# Patient Record
Sex: FEMALE | Race: BLACK OR AFRICAN AMERICAN | NOT HISPANIC OR LATINO | Employment: UNEMPLOYED | URBAN - METROPOLITAN AREA
[De-identification: names, ages, dates, MRNs, and addresses within clinical notes are randomized per-mention and may not be internally consistent; named-entity substitution may affect disease eponyms.]

---

## 2022-12-19 ENCOUNTER — HOSPITAL ENCOUNTER (OUTPATIENT)
Dept: DATA CONVERSION | Facility: HOSPITAL | Age: 22
End: 2022-12-19
Attending: STUDENT IN AN ORGANIZED HEALTH CARE EDUCATION/TRAINING PROGRAM

## 2022-12-19 DIAGNOSIS — R11.0 NAUSEA: ICD-10-CM

## 2022-12-19 DIAGNOSIS — O10.912 UNSPECIFIED PRE-EXISTING HYPERTENSION COMPLICATING PREGNANCY, SECOND TRIMESTER (HHS-HCC): ICD-10-CM

## 2022-12-19 DIAGNOSIS — O36.8120 DECREASED FETAL MOVEMENTS, SECOND TRIMESTER, NOT APPLICABLE OR UNSPECIFIED (HHS-HCC): ICD-10-CM

## 2022-12-19 DIAGNOSIS — Z34.80 ENCOUNTER FOR SUPERVISION OF OTHER NORMAL PREGNANCY, UNSPECIFIED TRIMESTER (HHS-HCC): ICD-10-CM

## 2022-12-19 DIAGNOSIS — Z79.82 LONG TERM (CURRENT) USE OF ASPIRIN: ICD-10-CM

## 2022-12-19 DIAGNOSIS — R10.2 PELVIC AND PERINEAL PAIN: ICD-10-CM

## 2022-12-19 DIAGNOSIS — R19.7 DIARRHEA, UNSPECIFIED: ICD-10-CM

## 2022-12-19 DIAGNOSIS — Z3A.25 25 WEEKS GESTATION OF PREGNANCY (HHS-HCC): ICD-10-CM

## 2022-12-19 DIAGNOSIS — O26.892 OTHER SPECIFIED PREGNANCY RELATED CONDITIONS, SECOND TRIMESTER (HHS-HCC): ICD-10-CM

## 2022-12-19 DIAGNOSIS — O09.212 SUPERVISION OF PREGNANCY WITH HISTORY OF PRE-TERM LABOR, SECOND TRIMESTER (HHS-HCC): ICD-10-CM

## 2022-12-19 DIAGNOSIS — Z79.899 OTHER LONG TERM (CURRENT) DRUG THERAPY: ICD-10-CM

## 2022-12-19 LAB
APPEARANCE, URINE: CLEAR
BILIRUBIN, URINE: NEGATIVE
BLOOD, URINE: NEGATIVE
COLOR, URINE: YELLOW
GLUCOSE, URINE: NEGATIVE MG/DL
KETONES, URINE: NEGATIVE MG/DL
LEUKOCYTE ESTERASE, URINE: NEGATIVE
NITRITE, URINE: NEGATIVE
PH, URINE: 6.5 (ref 5–8)
PROTEIN, URINE: NEGATIVE MG/DL
SPECIFIC GRAVITY, URINE: 1.02 (ref 1–1.03)
UROBILINOGEN, URINE: <2 MG/DL (ref 0–1.9)

## 2023-01-23 ENCOUNTER — HOSPITAL ENCOUNTER (OUTPATIENT)
Dept: DATA CONVERSION | Facility: HOSPITAL | Age: 23
End: 2023-01-23
Attending: OBSTETRICS & GYNECOLOGY

## 2023-01-23 DIAGNOSIS — Z3A.30 30 WEEKS GESTATION OF PREGNANCY (HHS-HCC): ICD-10-CM

## 2023-01-23 DIAGNOSIS — F43.10 POST-TRAUMATIC STRESS DISORDER, UNSPECIFIED: ICD-10-CM

## 2023-01-23 DIAGNOSIS — F32.A DEPRESSION, UNSPECIFIED: ICD-10-CM

## 2023-01-23 DIAGNOSIS — O99.343 OTHER MENTAL DISORDERS COMPLICATING PREGNANCY, THIRD TRIMESTER (HHS-HCC): ICD-10-CM

## 2023-01-23 DIAGNOSIS — O26.893 OTHER SPECIFIED PREGNANCY RELATED CONDITIONS, THIRD TRIMESTER (HHS-HCC): ICD-10-CM

## 2023-01-23 DIAGNOSIS — R10.2 PELVIC AND PERINEAL PAIN: ICD-10-CM

## 2023-01-23 DIAGNOSIS — O36.8130 DECREASED FETAL MOVEMENTS, THIRD TRIMESTER, NOT APPLICABLE OR UNSPECIFIED (HHS-HCC): ICD-10-CM
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
## 2023-01-23 DIAGNOSIS — Z34.80 ENCOUNTER FOR SUPERVISION OF OTHER NORMAL PREGNANCY, UNSPECIFIED TRIMESTER (HHS-HCC): ICD-10-CM

## 2023-01-23 DIAGNOSIS — O09.293 SUPERVISION OF PREGNANCY WITH OTHER POOR REPRODUCTIVE OR OBSTETRIC HISTORY, THIRD TRIMESTER (HHS-HCC): ICD-10-CM

## 2023-01-23 LAB
ACTIVATED PARTIAL THROMBOPLASTIN TIME IN PPP BY COAGULATION ASSAY: 24 SEC (ref 26–39)
ALANINE AMINOTRANSFERASE (SGPT) (U/L) IN SER/PLAS: 6 U/L (ref 7–45)
ALBUMIN (G/DL) IN SER/PLAS: 3.6 G/DL (ref 3.4–5)
ALKALINE PHOSPHATASE (U/L) IN SER/PLAS: 149 U/L (ref 33–110)
ANION GAP IN SER/PLAS: 15 MMOL/L (ref 10–20)
ASPARTATE AMINOTRANSFERASE (SGOT) (U/L) IN SER/PLAS: 10 U/L (ref 9–39)
BILIRUBIN TOTAL (MG/DL) IN SER/PLAS: 0.3 MG/DL (ref 0–1.2)
CALCIUM (MG/DL) IN SER/PLAS: 9.2 MG/DL (ref 8.6–10.6)
CARBON DIOXIDE, TOTAL (MMOL/L) IN SER/PLAS: 21 MMOL/L (ref 21–32)
CHLORIDE (MMOL/L) IN SER/PLAS: 104 MMOL/L (ref 98–107)
CLUE CELLS WET PREP: NORMAL
CREATININE (MG/DL) IN SER/PLAS: 0.45 MG/DL (ref 0.5–1.05)
ERYTHROCYTE DISTRIBUTION WIDTH (RATIO) BY AUTOMATED COUNT: 14.5 % (ref 11.5–14.5)
ERYTHROCYTE MEAN CORPUSCULAR HEMOGLOBIN CONCENTRATION (G/DL) BY AUTOMATED: 31.4 G/DL (ref 32–36)
ERYTHROCYTE MEAN CORPUSCULAR VOLUME (FL) BY AUTOMATED COUNT: 83 FL (ref 80–100)
ERYTHROCYTES (10*6/UL) IN BLOOD BY AUTOMATED COUNT: 4.67 X10E12/L (ref 4–5.2)
FIBRINOGEN (MG/DL) IN PPP BY COAGULATION ASSAY: 560 MG/DL (ref 200–400)
GFR FEMALE: >90 ML/MIN/1.73M2
GLUCOSE (MG/DL) IN SER/PLAS: 89 MG/DL (ref 74–99)
HEMATOCRIT (%) IN BLOOD BY AUTOMATED COUNT: 38.8 % (ref 36–46)
HEMOGLOBIN (G/DL) IN BLOOD: 12.2 G/DL (ref 12–16)
INR IN PPP BY COAGULATION ASSAY: 0.9 (ref 0.9–1.1)
LEUKOCYTES (10*3/UL) IN BLOOD BY AUTOMATED COUNT: 12.1 X10E9/L (ref 4.4–11.3)
NRBC (PER 100 WBCS) BY AUTOMATED COUNT: 0 /100 WBC (ref 0–0)
PLATELETS (10*3/UL) IN BLOOD AUTOMATED COUNT: 253 X10E9/L (ref 150–450)
POCT GLUCOSE: 117 MG/DL (ref 74–99)
POTASSIUM (MMOL/L) IN SER/PLAS: 3.7 MMOL/L (ref 3.5–5.3)
PROTEIN TOTAL: 6.4 G/DL (ref 6.4–8.2)
PROTHROMBIN TIME (PT) IN PPP BY COAGULATION ASSAY: 10.8 SEC (ref 9.8–13.4)
SODIUM (MMOL/L) IN SER/PLAS: 136 MMOL/L (ref 136–145)
TRICH WET PREP: NORMAL
TRICHOMONAS REFLEX COMMENT: NORMAL
UREA NITROGEN (MG/DL) IN SER/PLAS: 6 MG/DL (ref 6–23)
WBC WET PREP: NORMAL /HPF
YEAST PRESENCE WET PREP: NORMAL

## 2023-01-24 LAB
CHLAMYDIA TRACH., AMPLIFIED: NEGATIVE
N. GONORRHEA, AMPLIFIED: NEGATIVE
TRICHOMONAS VAGINALIS: NEGATIVE

## 2023-02-02 ENCOUNTER — HOSPITAL ENCOUNTER (OUTPATIENT)
Dept: DATA CONVERSION | Facility: HOSPITAL | Age: 23
End: 2023-02-02
Attending: OBSTETRICS & GYNECOLOGY
Payer: MEDICAID

## 2023-02-02 DIAGNOSIS — N89.8 OTHER SPECIFIED NONINFLAMMATORY DISORDERS OF VAGINA: ICD-10-CM

## 2023-02-02 DIAGNOSIS — R00.0 TACHYCARDIA, UNSPECIFIED: ICD-10-CM

## 2023-02-02 DIAGNOSIS — O09.213 SUPERVISION OF PREGNANCY WITH HISTORY OF PRE-TERM LABOR, THIRD TRIMESTER (HHS-HCC): ICD-10-CM

## 2023-02-02 DIAGNOSIS — O26.893 OTHER SPECIFIED PREGNANCY RELATED CONDITIONS, THIRD TRIMESTER (HHS-HCC): ICD-10-CM

## 2023-02-02 DIAGNOSIS — O99.891 OTHER SPECIFIED DISEASES AND CONDITIONS COMPLICATING PREGNANCY (HHS-HCC): ICD-10-CM

## 2023-02-02 DIAGNOSIS — Z3A.31 31 WEEKS GESTATION OF PREGNANCY (HHS-HCC): ICD-10-CM

## 2023-02-02 DIAGNOSIS — O24.410 GESTATIONAL DIABETES MELLITUS IN PREGNANCY, DIET CONTROLLED (HHS-HCC): ICD-10-CM

## 2023-02-28 LAB
ATRIAL RATE: 118 BPM
P AXIS: 40 DEGREES
P OFFSET: 204 MS
P ONSET: 155 MS
PR INTERVAL: 140 MS
Q ONSET: 225 MS
QRS COUNT: 19 BEATS
QRS DURATION: 70 MS
QT INTERVAL: 310 MS
QTC CALCULATION(BAZETT): 434 MS
QTC FREDERICIA: 388 MS
R AXIS: 69 DEGREES
T AXIS: 9 DEGREES
T OFFSET: 380 MS
VENTRICULAR RATE: 118 BPM

## 2023-03-01 NOTE — PROGRESS NOTES
Current Stage:   Stage: Triage     Subjective Data:   Antepartum:  Antepartum:    Scooter is a 23yo  at 25.5wks b/o LMP and c/w 11.2wk U/S who presents to triage for decreased fetal movement and pelvic pain. Patient states that she began to feel less  fetal movement earlier today, as well as abdominal pain throughout her abdomen which she was concerned were contractions. She states that the pain feels like contractions, but she doesn't feel like she's dilating like her previous children. Additionally,  she states that she is having something bulging out of her vagina, which has been going on since the birth of her son. She states that she saw another doctor about it who offered her a hysterectomy to treat this. She does endorse some nausea and reflux.  She denies any chest pain, but does feel palpitations. She endorses incontinence and hesitancy, but no dysuria, changes in bowels, loss of fluid or vaginal bleeding.    Pregnancy notable for:  - cHTN, no meds, baseline labs WNL, P:C 0.11  - PPH from manual removal of placenta  - Desires bTL, signed consents 10/13  - H/o PPD, refer to Dr. Lombardi PP w/ plan to start SSRI PP  - Fetal PACs, since resolved  - PTD @ 35wks (PPROM, sPEC)  & @ 36wks (DM, PEC)     OBHx: pre-eclampsia x2, GDMA   GYNHx: Hx of gonorrhea, no abnl pap, cystocele?  PMH: denies  PSH: denies  Allergies: NKDA  Meds: pnv, metoclopramide, B6, ASA 81mg bid  Social: denies tobacco, etoh, drug use  FHx: reviewed and not pertinent to clinical presentation        Objective Information:    Objective Information:      T   P  R  BP   MAP  SpO2   Value  37.1  110  19  121/67   88  98%  Date/Time  12:56  13:39 12 12:56  12:56   12:56  13:39  Range  (36.6C - 37.1C )  (108 - 120 )  (16 - 19 )  (121 - 131 )/ (67 - 77 )  (88 - 88 )  (97% - 99% )  Highest temp of 37.1 C was recorded at  12:56      Pain reported at  12:11: 10 = Severe      Physical Exam:    Constitutional: alert, oriented, in no acute distress.   Obstetric: Cervix closed, high and firm.   Eyes: pupils equal, sclerae clear   ENMT: normal dentition, moist mucous membranes.   Respiratory/Thorax: normal respiratory effort, lungs  clear, no rales wheezes or rhonchi   Cardiovascular: normal S1/S2 RRR, no murmurs   Gastrointestinal: gravid abdomen, soft, moderately  TTP with displacement of uterus, nondistended without guarding or rebound tenderness.   Musculoskeletal: normal gait   Extremities: no edema or erythema of LE, 2+ PT, DP,  and radial pulses bilaterally.   Neurological: no obvious facial droop, slurred speech,  moves all four extremities spontaneously. No evidence of focal deficit   Psychological: appropriate mood and affect   Skin: no rashes or lesions.      Testing:   Biophysical Profile - Baby A:  ·  Fetal Breath Movements At least 30 seconds of sustained FBM/30 minutes   ·  Fetal Movements 3 or more gross body movements/30 minutes   ·  Fetal Tone At least 1 episode of motion of limb   ·  Amniotic Fluid Volume Pocket of fluid at least 2 cm in 2 perpendicular planes   ·  BPP Score 8     Assessment and Plan:   Assessment:    Scooter is a 23yo  at 25.5wks b/o LMP and c/w 11.2wk U/S who presents to triage for decreased fetal movement and pelvic pain.    #decreased movement  -FHT reassuring, visualized movement on US  BPP 8/8    #abdominal pain  - labor less likely given normal cervical exam, possible gastroenteritis given abdominal pain and diarrhea  -Send urinalysis to confirm no evidence of UTI, nephrolithiasis  -Rx tylenol 500mg 2 q6hr as needed  -recommend heat packs, rest, abdominal binder    Dispo: routine OB follow-up    Discussed with Dr. Isaiah Wyatt MD  Emergency Medicine PGY-1      Attestation:   Note Completion:  I am a:  Resident/Fellow   Attending Attestation I saw and evaluated the patient.  I personally obtained the key and critical portions of the  history and physical exam or was physically present for key and  critical portions performed by the resident/fellow. I reviewed the resident/fellow?s documentation and discussed the patient with the resident/fellow.  I agree with the resident/fellow?s medical decision making as documented in the note.     I personally evaluated the patient on 19-Dec-2022         Electronic Signatures:  Param Wyatt (Resident))  (Signed 19-Dec-2022 14:34)   Authored: Current Stage, Subjective Data, Objective Data,   Testing, Assessment and Plan, Note Completion  Tammy Colon)  (Signed 19-Dec-2022 16:17)   Authored: Note Completion   Co-Signer: Current Stage, Subjective Data, Objective Data,  Testing, Assessment and Plan, Note Completion      Last Updated: 19-Dec-2022 16:17 by Tammy Colon)
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
## 2023-03-03 NOTE — PROGRESS NOTES
Current Stage:   Stage: Triage     OB Dating:   EDC/EGA:  ·  EGA 31.6     Subjective Data:   Antepartum:  Vaginal Bleeding: No   Contractions/Abdominal Pain: Yes   Discharge/Loss of Fluid: Yes   Fetal Movement: Good   Fevers/Chills: No   Preeclampsia Symptoms: No   Antepartum:    23 y/o  @ 31.6 presents with lof and irregular ctx. States she had a small amount of leaking around 2200 last night, no further leaking, not sure if it was  her water. Thinks she might be having ctx but unsure how frequent. Reports normal fetal movement, denies vaginal bleeding.      Objective Information:    Objective Information:      T   P  R  BP   MAP  SpO2   Value  36.9  100  20  130/75   95  98%  Date/Time  1:32  2:37 2 1:32 2 1:32  22 1:32  2:42  Range  (36.5C - 36.9C )  (100 - 120 )  (20 - 20 )  (128 - 130 )/ (75 - 84 )  (95 - 95 )  (97% - 99% )  Highest temp of 36.9 C was recorded at  1:32      Pain reported at  1:32: 0 = None      Physical Exam:   Constitutional: alert, oriented, appears comfortable   Obstetric: , mod variability, 10x10 accels,  no decels  [place] none  SSE neg pooling/nitrazine/ferning  SVE ft/50/-3     pulse initially 115-120--> 100-105 after by mouth hydration     Assessment and Plan:   Assessment:    a: 23 y/o  @ 31.6  intact membranes  not in ptl  NST reassuring for gestational age  tachycardia likely 2/2 dehydration, improved with by mouth fluids    p: discharge home w/precautions  f/u with scheduled appt  or as needed  d/w dr. suzanna ferguson cnm      Electronic Signatures:  Kae Ferguson (APRN-CNM)  (Signed 2023 02:50)   Authored: Current Stage, OB Dating, Subjective Data,  Objective Data, Assessment and Plan, Note Completion      Last Updated: 2023 02:50 by Kae Ferguson (APRN-JAMESM)

## 2023-05-24 ENCOUNTER — HOSPITAL ENCOUNTER (OUTPATIENT)
Dept: DATA CONVERSION | Facility: HOSPITAL | Age: 23
Discharge: HOME | End: 2023-05-24
Attending: STUDENT IN AN ORGANIZED HEALTH CARE EDUCATION/TRAINING PROGRAM | Admitting: STUDENT IN AN ORGANIZED HEALTH CARE EDUCATION/TRAINING PROGRAM

## 2023-05-24 DIAGNOSIS — N81.4 UTEROVAGINAL PROLAPSE, UNSPECIFIED: ICD-10-CM

## 2023-05-24 DIAGNOSIS — F43.10 POST-TRAUMATIC STRESS DISORDER, UNSPECIFIED: ICD-10-CM

## 2023-05-24 DIAGNOSIS — N32.81 OVERACTIVE BLADDER: ICD-10-CM

## 2023-05-24 DIAGNOSIS — N81.9 FEMALE GENITAL PROLAPSE, UNSPECIFIED: ICD-10-CM

## 2023-05-24 DIAGNOSIS — F32.A DEPRESSION, UNSPECIFIED: ICD-10-CM

## 2023-05-24 LAB
ABO GROUP (TYPE) IN BLOOD: NORMAL
ANTIBODY SCREEN: NORMAL
HCG, URINE: NEGATIVE
RH FACTOR: NORMAL

## 2023-07-26 ENCOUNTER — HOSPITAL ENCOUNTER (OUTPATIENT)
Dept: DATA CONVERSION | Facility: HOSPITAL | Age: 23
End: 2023-07-26
Attending: STUDENT IN AN ORGANIZED HEALTH CARE EDUCATION/TRAINING PROGRAM | Admitting: STUDENT IN AN ORGANIZED HEALTH CARE EDUCATION/TRAINING PROGRAM
Payer: MEDICAID

## 2023-07-26 DIAGNOSIS — Z30.2 ENCOUNTER FOR STERILIZATION: ICD-10-CM

## 2023-07-26 DIAGNOSIS — N83.8 OTHER NONINFLAMMATORY DISORDERS OF OVARY, FALLOPIAN TUBE AND BROAD LIGAMENT: ICD-10-CM

## 2023-07-26 LAB — HCG, URINE: NEGATIVE

## 2023-08-02 LAB
COMPLETE PATHOLOGY REPORT: NORMAL
CONVERTED CLINICAL DIAGNOSIS-HISTORY: NORMAL
CONVERTED FINAL DIAGNOSIS: NORMAL
CONVERTED FINAL REPORT PDF LINK TO COPY AND PASTE: NORMAL
CONVERTED GROSS DESCRIPTION: NORMAL

## 2023-09-07 VITALS
SYSTOLIC BLOOD PRESSURE: 131 MMHG | RESPIRATION RATE: 16 BRPM | OXYGEN SATURATION: 98 % | DIASTOLIC BLOOD PRESSURE: 77 MMHG | HEIGHT: 66 IN | WEIGHT: 194.89 LBS | HEART RATE: 120 BPM | TEMPERATURE: 97.9 F | BODY MASS INDEX: 31.32 KG/M2

## 2023-09-07 VITALS
HEART RATE: 87 BPM | DIASTOLIC BLOOD PRESSURE: 83 MMHG | TEMPERATURE: 97.2 F | RESPIRATION RATE: 16 BRPM | SYSTOLIC BLOOD PRESSURE: 126 MMHG

## 2023-09-08 VITALS
BODY MASS INDEX: 32.88 KG/M2 | HEART RATE: 114 BPM | WEIGHT: 204.59 LBS | HEIGHT: 66 IN | SYSTOLIC BLOOD PRESSURE: 128 MMHG | TEMPERATURE: 97.7 F | DIASTOLIC BLOOD PRESSURE: 84 MMHG | RESPIRATION RATE: 20 BRPM | OXYGEN SATURATION: 97 %

## 2023-09-08 VITALS
RESPIRATION RATE: 20 BRPM | DIASTOLIC BLOOD PRESSURE: 76 MMHG | HEIGHT: 66 IN | TEMPERATURE: 98.8 F | WEIGHT: 202.82 LBS | HEART RATE: 135 BPM | OXYGEN SATURATION: 99 % | SYSTOLIC BLOOD PRESSURE: 118 MMHG | BODY MASS INDEX: 32.6 KG/M2
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
## 2023-09-14 NOTE — PROGRESS NOTES
Current Stage:   Stage: Triage     OB Dating:   EDC/EGA:  ·  EGA 31.6     Subjective Data:   Antepartum:  Vaginal Bleeding: No   Contractions/Abdominal Pain: Yes   Discharge/Loss of Fluid: Yes   Fetal Movement: Good   Fevers/Chills: No   Preeclampsia Symptoms: No   Antepartum:    23 y/o  @ 31.6 presents with lof and irregular ctx. States she had a small amount of leaking around 2200 last night, no further leaking, not sure if it was  her water. Thinks she might be having ctx but unsure how frequent. Reports normal fetal movement, denies vaginal bleeding.      Objective Information:    Objective Information:      T   P  R  BP   MAP  SpO2   Value  36.9  100  20  130/75   95  98%  Date/Time  1:32  2:37 2 1:32 2 1:32  22 1:32  2:42  Range  (36.5C - 36.9C )  (100 - 120 )  (20 - 20 )  (128 - 130 )/ (75 - 84 )  (95 - 95 )  (97% - 99% )  Highest temp of 36.9 C was recorded at  1:32      Pain reported at  1:32: 0 = None      Physical Exam:   Constitutional: alert, oriented, appears comfortable   Obstetric: , mod variability, 10x10 accels,  no decels  [place] none  SSE neg pooling/nitrazine/ferning  SVE ft/50/-3     pulse initially 115-120--> 100-105 after by mouth hydration     Assessment and Plan:   Assessment:    a: 23 y/o  @ 31.6  intact membranes  not in ptl  NST reassuring for gestational age  tachycardia likely 2/2 dehydration, improved with by mouth fluids    p: discharge home w/precautions  f/u with scheduled appt  or as needed  d/w dr. suzanna ferguson cnm      Electronic Signatures:  Kae Ferguson (APRN-CNM)  (Signed 2023 02:50)   Authored: Current Stage, OB Dating, Subjective Data,  Objective Data, Assessment and Plan, Note Completion      Last Updated: 2023 02:50 by Kae Ferguson (APRN-JAMESM)

## 2023-09-14 NOTE — PROGRESS NOTES
Current Stage:   Stage: Triage     Subjective Data:   Antepartum:  Antepartum:    Scooter is a 21yo  at 25.5wks b/o LMP and c/w 11.2wk U/S who presents to triage for decreased fetal movement and pelvic pain. Patient states that she began to feel less  fetal movement earlier today, as well as abdominal pain throughout her abdomen which she was concerned were contractions. She states that the pain feels like contractions, but she doesn't feel like she's dilating like her previous children. Additionally,  she states that she is having something bulging out of her vagina, which has been going on since the birth of her son. She states that she saw another doctor about it who offered her a hysterectomy to treat this. She does endorse some nausea and reflux.  She denies any chest pain, but does feel palpitations. She endorses incontinence and hesitancy, but no dysuria, changes in bowels, loss of fluid or vaginal bleeding.    Pregnancy notable for:  - cHTN, no meds, baseline labs WNL, P:C 0.11  - PPH from manual removal of placenta  - Desires bTL, signed consents 10/13  - H/o PPD, refer to Dr. Lombardi PP w/ plan to start SSRI PP  - Fetal PACs, since resolved  - PTD @ 35wks (PPROM, sPEC)  & @ 36wks (DM, PEC)     OBHx: pre-eclampsia x2, GDMA   GYNHx: Hx of gonorrhea, no abnl pap, cystocele?  PMH: denies  PSH: denies  Allergies: NKDA  Meds: pnv, metoclopramide, B6, ASA 81mg bid  Social: denies tobacco, etoh, drug use  FHx: reviewed and not pertinent to clinical presentation        Objective Information:    Objective Information:      T   P  R  BP   MAP  SpO2   Value  37.1  110  19  121/67   88  98%  Date/Time  12:56  13:39 12 12:56  12:56   12:56  13:39  Range  (36.6C - 37.1C )  (108 - 120 )  (16 - 19 )  (121 - 131 )/ (67 - 77 )  (88 - 88 )  (97% - 99% )  Highest temp of 37.1 C was recorded at  12:56      Pain reported at  12:11: 10 = Severe      Physical Exam:    Constitutional: alert, oriented, in no acute distress.   Obstetric: Cervix closed, high and firm.   Eyes: pupils equal, sclerae clear   ENMT: normal dentition, moist mucous membranes.   Respiratory/Thorax: normal respiratory effort, lungs  clear, no rales wheezes or rhonchi   Cardiovascular: normal S1/S2 RRR, no murmurs   Gastrointestinal: gravid abdomen, soft, moderately  TTP with displacement of uterus, nondistended without guarding or rebound tenderness.   Musculoskeletal: normal gait   Extremities: no edema or erythema of LE, 2+ PT, DP,  and radial pulses bilaterally.   Neurological: no obvious facial droop, slurred speech,  moves all four extremities spontaneously. No evidence of focal deficit   Psychological: appropriate mood and affect   Skin: no rashes or lesions.      Testing:   Biophysical Profile - Baby A:  ·  Fetal Breath Movements At least 30 seconds of sustained FBM/30 minutes   ·  Fetal Movements 3 or more gross body movements/30 minutes   ·  Fetal Tone At least 1 episode of motion of limb   ·  Amniotic Fluid Volume Pocket of fluid at least 2 cm in 2 perpendicular planes   ·  BPP Score 8     Assessment and Plan:   Assessment:    Scooter is a 21yo  at 25.5wks b/o LMP and c/w 11.2wk U/S who presents to triage for decreased fetal movement and pelvic pain.    #decreased movement  -FHT reassuring, visualized movement on US  BPP 8/8    #abdominal pain  - labor less likely given normal cervical exam, possible gastroenteritis given abdominal pain and diarrhea  -Send urinalysis to confirm no evidence of UTI, nephrolithiasis  -Rx tylenol 500mg 2 q6hr as needed  -recommend heat packs, rest, abdominal binder    Dispo: routine OB follow-up    Discussed with Dr. Isaiah Wyatt MD  Emergency Medicine PGY-1      Attestation:   Note Completion:  I am a:  Resident/Fellow   Attending Attestation I saw and evaluated the patient.  I personally obtained the key and critical portions of the  history and physical exam or was physically present for key and  critical portions performed by the resident/fellow. I reviewed the resident/fellow?s documentation and discussed the patient with the resident/fellow.  I agree with the resident/fellow?s medical decision making as documented in the note.     I personally evaluated the patient on 19-Dec-2022         Electronic Signatures:  Param Wyatt (Resident))  (Signed 19-Dec-2022 14:34)   Authored: Current Stage, Subjective Data, Objective Data,   Testing, Assessment and Plan, Note Completion  Tammy Colon)  (Signed 19-Dec-2022 16:17)   Authored: Note Completion   Co-Signer: Current Stage, Subjective Data, Objective Data,  Testing, Assessment and Plan, Note Completion      Last Updated: 19-Dec-2022 16:17 by Tammy Colon)

## 2023-09-29 VITALS — WEIGHT: 197.09 LBS | HEIGHT: 66 IN | BODY MASS INDEX: 31.68 KG/M2

## 2023-09-30 ENCOUNTER — PHARMACY VISIT (OUTPATIENT)
Dept: PHARMACY | Facility: CLINIC | Age: 23
End: 2023-09-30
Payer: MEDICAID

## 2023-09-30 PROCEDURE — RXMED WILLOW AMBULATORY MEDICATION CHARGE

## 2023-09-30 NOTE — H&P
History of Present Illness:   Pregnant/Lactating:  ·  Are You Pregnant no   ·  Are You Currently Breastfeeding no     History Present Illness:  Reason for surgery: stage II uterovaginal prolapse,  desire for sterilization   HPI:    21 y/o with stage II uterovaginal prolapse and desire for sterilization here for surgical management.    UDS: declined to perform, would desire urethral bulking as a 2nd procedure if leaks after prolapse repair  POPQ: Stage: 2. Aa: 1. Ba: 1 C: -2 Gh: 4. Pb: 3 TVL: 8 Ap: -2. Bp: -2. D: -2.    Tubal consent signed 10/13/22.    Allergies:        Allergies:  ·  No Known Allergies :     Home Medication Review:   Home Medications Reviewed: yes     Impression/Procedure:   ·  Impression and Planned Procedure: sacrospinous ligament fixation, anterior/posterior repair, cystoscopy, laparoscopic bilateral salpingectomy       ERAS (Enhanced Recovery After Surgery):  ·  ERAS Patient: no     Review of Systems:   Review of Systems:  All Other Systems: All other systems reviewed and  are negative       Vital Signs:  Temperature C: 36.2 degrees C   Temperature F: 97.1 degrees F   Heart Rate: 87 beats per minute   Respiratory Rate: 16 breath per minute   Blood Pressure Systolic: 126 mm/Hg   Blood Pressure Diastolic: 83 mm/Hg     Physical Exam by System:    Constitutional: AOx3, NAD   Eyes: PERRL   Respiratory/Thorax: no increased work of breathing   Cardiovascular: RRR   Genitourinary: Per HPI, no discharge, no bleeding   Neurological: Grossly intact   Psychological: Appropriate affect   Skin: no lesions or rashes     Consent:   COVID-19 Consent:  ·  COVID-19 Risk Consent Surgeon has reviewed key risks related to the risk of dolores COVID-19 and if they contract COVID-19 what the risks are.     Attestation:   Note Completion:  I am a:  Resident/Fellow   Attending Attestation I saw and evaluated the patient.  I personally obtained the key and critical portions of the history and physical exam or was  physically present for key and  critical portions performed by the resident/fellow. I reviewed the resident/fellow?s documentation and discussed the patient with the resident/fellow.  I agree with the resident/fellow?s medical decision making as documented in the note.     I personally evaluated the patient on 24-May-2023         Electronic Signatures:  Palak Devlin)  (Signed 23-May-2023 18:42)   Authored: History of Present Illness, Allergies, Home  Medication Review, Impression/Procedure, ERAS, Review of Systems, Physical Exam, Consent, Note Completion  Jaoo Garcia)  (Signed 24-May-2023 07:25)   Authored: Physical Exam, Note Completion   Co-Signer: History of Present Illness, Allergies, Home Medication Review, Impression/Procedure, ERAS, Review of Systems, Physical Exam,  Consent, Note Completion      Last Updated: 24-May-2023 07:25 by Joao Garcia)

## 2023-09-30 NOTE — H&P
History & Physical Reviewed:   Pregnant/Lactating:  ·  Are You Pregnant no   ·  Are You Currently Breastfeeding no     I have reviewed the History and Physical dated:  26-Jul-2023   History and Physical reviewed and relevant findings noted. Patient examined to review pertinent physical  findings.: No significant changes   Home Medications Reviewed: no changes noted   Allergies Reviewed: no changes noted       ERAS (Enhanced Recovery After Surgery):  ·  ERAS Patient: no     Consent:   COVID-19 Consent:  ·  COVID-19 Risk Consent Surgeon has reviewed key risks related to the risk of dolores COVID-19 and if they contract COVID-19 what the risks are.       Electronic Signatures:  Joao Garcia)  (Signed 26-Jul-2023 10:45)   Authored: History & Physical Reviewed, ERAS, Consent,  Note Completion      Last Updated: 26-Jul-2023 10:45 by Joao Garcia)

## 2023-10-02 NOTE — OP NOTE
Post Operative Note:     PreOp Diagnosis: desires sterilization   Post-Procedure Diagnosis: same   Procedure: 1. laparoscopic bilateral salpingectomy   Surgeon: ezio   Resident/Fellow/Other Assistant: [email or web address]   Anesthesia: ga   Estimated Blood Loss (mL): none   Specimen: yes   Findings: normal adnexa and uterus     Operative Report Dictated:  Dictation: not applicable - note contains Operative  Report   Operative Report:    Patient was taken to the operating room, prepped  and draped in usual sterile fashion after being placed in dorsal lithotomy. A Saavedra catheter was placed in the bladder.   The abdomen was entered through the umbilicus using the open Chano technique and a 5 mm balloon port was inserted.  Patient was taken to the operating room, prepped  and draped in usual sterile fashion after being placed in dorsal lithotomy. A Saavedra  catheter was placed in the bladder, and an [employer] uterine manipulator was placed in the uterine.. We began the procedure by amputating the left fallopian tube  off the mesosalpinx using cautery and sharp dissection.  The same was done on the right. The  abdomen was desufflated and the trocars were removed. All skin incisions were closed using 4-0 vicryl.       Electronic Signatures:  Joao Garcia)  (Signed 26-Jul-2023 15:48)   Authored: Post Operative Note, Note Completion      Last Updated: 26-Jul-2023 15:48 by Joao Garcia)

## 2023-10-02 NOTE — OP NOTE
Post Operative Note:     PreOp Diagnosis: prolapse   Post-Procedure Diagnosis: same   Procedure: 1. sacrospinous fixation   2. anterior repair   3. posterior repair  4. cystoscopy   5. Pudendal nerve block   Surgeon: ezio   Resident/Fellow/Other Assistant: bhumi milligan   Estimated Blood Loss (mL): 20   Specimen: no   Findings: stage 2 uterovaginal prolapse     Operative Report Dictated:  Dictation: not applicable - note contains Operative  Report   Operative Report:    After informed consent was obtained, the patient was taken to the operating room where general anesthesia via oral ETT was administered. She was placed in the dorsal  high lithotomy position using candy cane stirrups, being careful not to hyperflex or hyperabduct the legs. Arms were kept on arm boards at the sides, SCD stockings were placed, and a time-out was performed. She was then prepped and draped in the usual  sterile fashion.     First, a miranda was placed to gravity and the bladder completely drained. Attention was turned to the anterior wall. Two Allis clamps were placed on the anterior wall, one underneath and proximal to the urethra, and one at the most dependent portion of  the cystocele.  1% lidocaine with epinephrine was injected over the anterior vaginal wall up to the apex of the cystocele. A vaginal incision was then performed using a scalpel over the anterior vaginal epithelium up to the apex of the cystocele. Sharp  dissection was carried out bilaterally and the perivesical fascia mobilized off of the vaginal epithelium bilaterally. The bladder was mobilized. Using careful blunt dissection, the paravesical fascia and fibromuscular vaginal wall was further mobilized  on the patients right down to the level of the iscial spine. This was easily palpated. The peritoneum was not entered during this dissection. The sacrospinous ligament on the right was easily palpated from its source on the ischial spine as it coursed  posteriorly  and medially towards the sacrum.  A Capio device was then loaded with an 0 PDS suture and introduced over the sacrospinous ligament. With the ischial spine palpated just laterally and the rectum retracted medially, the Capio device was fired  and the PDS suture pulled through the ligament. Excellent placement was confirmed on palpation. A second suture of the same was then placed 0.5-1cm medial to the previous suture. Excellent placement was similarly confirmed on palpation. An over-glove  was placed and a careful rectal exam noted no rectal involvement. The PDS sutures were then taken through the previously dissected vaginal apex using the needles and a free Sanchez needle on the free end. These were tagged.    The cystocele repair was then performed with several vertical mattress sutures of 0 Vicryl, reapproximating the fascial defects in the midline.  This was done in two layers.  A very small amount of vaginal epithelium was excised and the vaginal epithelium  was then closed with a running stitch of 0 Vicryl. Hemostasis was satisfactory. The PDS sutures were then tied up to their sacrospinous attachment points with elevation of the vaginal apex to roughly 8cm of total vaginal depth.     2 Allys clamps were used to grasp the lateral aspects of the posterior vagina. Using dilute vasopressin, the vaginal mucosa and perineum was injected. A scalpel was used to skin the perineum and posterior aspect of the vagina in a small evelyn shape.  The fibromuscular wall was then sharply dissected off the overlying vaginal mucosa. This dissection was taken out laterally on the left and right to the lateral sulci. Using several interrupted figure of 8 0-vicryl sutures, the fibromuscular was was re  approximated in the midline. The bulbocavernosus muscles on the right and left were then re approximated by 0 vicryl at the posterior fourchette. The vaginal mucosa was trimmed and closed in a running locked 3-0 vicryl suture.  Hemostasis was excellent.  The vaginal opening could easily fit 2 gloved fingers.      The sponge and instrument counts were correct.    The patient tolerated the procedure well and was transferred to the Recovery Room in excellent condition.     Cystoscopy was then performed revealing a normal bladder with bilateral ureteral spill. The bladder was then drained.       Electronic Signatures:  Joao Garcia)  (Signed 24-May-2023 09:23)   Authored: Post Operative Note, Note Completion      Last Updated: 24-May-2023 09:23 by Joao Garcia)

## 2023-10-04 ENCOUNTER — TELEPHONE (OUTPATIENT)
Dept: OBSTETRICS AND GYNECOLOGY | Facility: HOSPITAL | Age: 23
End: 2023-10-04
Payer: MEDICAID

## 2023-10-04 ENCOUNTER — ANCILLARY PROCEDURE (OUTPATIENT)
Dept: RADIOLOGY | Facility: CLINIC | Age: 23
End: 2023-10-04
Payer: MEDICAID

## 2023-10-04 DIAGNOSIS — Z36.82 NUCHAL TRANSLUCENCY OF FETUS ON PRENATAL ULTRASOUND (HHS-HCC): ICD-10-CM

## 2023-10-04 PROCEDURE — 76813 OB US NUCHAL MEAS 1 GEST: CPT | Performed by: OBSTETRICS & GYNECOLOGY

## 2023-10-04 PROCEDURE — 76813 OB US NUCHAL MEAS 1 GEST: CPT

## 2023-10-12 ENCOUNTER — PHARMACY VISIT (OUTPATIENT)
Dept: PHARMACY | Facility: CLINIC | Age: 23
End: 2023-10-12
Payer: MEDICAID

## 2023-10-12 PROCEDURE — RXMED WILLOW AMBULATORY MEDICATION CHARGE

## 2023-10-15 ENCOUNTER — APPOINTMENT (OUTPATIENT)
Dept: RADIOLOGY | Facility: HOSPITAL | Age: 23
End: 2023-10-15
Payer: MEDICAID

## 2023-10-15 ENCOUNTER — HOSPITAL ENCOUNTER (EMERGENCY)
Facility: HOSPITAL | Age: 23
Discharge: HOME | End: 2023-10-15
Attending: STUDENT IN AN ORGANIZED HEALTH CARE EDUCATION/TRAINING PROGRAM
Payer: MEDICAID

## 2023-10-15 VITALS
BODY MASS INDEX: 31.66 KG/M2 | SYSTOLIC BLOOD PRESSURE: 124 MMHG | DIASTOLIC BLOOD PRESSURE: 65 MMHG | HEART RATE: 88 BPM | RESPIRATION RATE: 18 BRPM | HEIGHT: 66 IN | OXYGEN SATURATION: 97 % | WEIGHT: 197 LBS | TEMPERATURE: 98.2 F

## 2023-10-15 DIAGNOSIS — R10.84 GENERALIZED ABDOMINAL PAIN: Primary | ICD-10-CM

## 2023-10-15 LAB
ALBUMIN SERPL BCP-MCNC: 4.2 G/DL (ref 3.4–5)
ALBUMIN SERPL BCP-MCNC: 4.2 G/DL (ref 3.4–5)
ALP SERPL-CCNC: 77 U/L (ref 33–110)
ALT SERPL W P-5'-P-CCNC: 11 U/L (ref 7–45)
ANION GAP SERPL CALC-SCNC: 15 MMOL/L (ref 10–20)
APPEARANCE UR: ABNORMAL
AST SERPL W P-5'-P-CCNC: 12 U/L (ref 9–39)
BASOPHILS # BLD AUTO: 0.06 X10*3/UL (ref 0–0.1)
BASOPHILS NFR BLD AUTO: 0.5 %
BILIRUB DIRECT SERPL-MCNC: 0 MG/DL (ref 0–0.3)
BILIRUB SERPL-MCNC: 0.3 MG/DL (ref 0–1.2)
BILIRUB UR STRIP.AUTO-MCNC: NEGATIVE MG/DL
BUN SERPL-MCNC: 7 MG/DL (ref 6–23)
CALCIUM SERPL-MCNC: 9.8 MG/DL (ref 8.6–10.6)
CHLORIDE SERPL-SCNC: 103 MMOL/L (ref 98–107)
CLUE CELLS SPEC QL WET PREP: NORMAL
CO2 SERPL-SCNC: 23 MMOL/L (ref 21–32)
COLOR UR: YELLOW
CREAT SERPL-MCNC: 0.51 MG/DL (ref 0.5–1.05)
EOSINOPHIL # BLD AUTO: 0.16 X10*3/UL (ref 0–0.7)
EOSINOPHIL NFR BLD AUTO: 1.3 %
ERYTHROCYTE [DISTWIDTH] IN BLOOD BY AUTOMATED COUNT: 14.1 % (ref 11.5–14.5)
GFR SERPL CREATININE-BSD FRML MDRD: >90 ML/MIN/1.73M*2
GLUCOSE SERPL-MCNC: 82 MG/DL (ref 74–99)
GLUCOSE UR STRIP.AUTO-MCNC: NEGATIVE MG/DL
HCT VFR BLD AUTO: 37.9 % (ref 36–46)
HGB BLD-MCNC: 12.6 G/DL (ref 12–16)
IMM GRANULOCYTES # BLD AUTO: 0.07 X10*3/UL (ref 0–0.7)
IMM GRANULOCYTES NFR BLD AUTO: 0.6 % (ref 0–0.9)
KETONES UR STRIP.AUTO-MCNC: ABNORMAL MG/DL
LEUKOCYTE ESTERASE UR QL STRIP.AUTO: NEGATIVE
LYMPHOCYTES # BLD AUTO: 2.03 X10*3/UL (ref 1.2–4.8)
LYMPHOCYTES NFR BLD AUTO: 16.7 %
MCH RBC QN AUTO: 27.3 PG (ref 26–34)
MCHC RBC AUTO-ENTMCNC: 33.2 G/DL (ref 32–36)
MCV RBC AUTO: 82 FL (ref 80–100)
MONOCYTES # BLD AUTO: 0.73 X10*3/UL (ref 0.1–1)
MONOCYTES NFR BLD AUTO: 6 %
NEUTROPHILS # BLD AUTO: 9.13 X10*3/UL (ref 1.2–7.7)
NEUTROPHILS NFR BLD AUTO: 74.9 %
NITRITE UR QL STRIP.AUTO: NEGATIVE
NRBC BLD-RTO: 0 /100 WBCS (ref 0–0)
PH UR STRIP.AUTO: 6 [PH]
PHOSPHATE SERPL-MCNC: 4.5 MG/DL (ref 2.5–4.9)
PLATELET # BLD AUTO: 303 X10*3/UL (ref 150–450)
PMV BLD AUTO: 9 FL (ref 7.5–11.5)
POTASSIUM SERPL-SCNC: 3.6 MMOL/L (ref 3.5–5.3)
PROT SERPL-MCNC: 7.1 G/DL (ref 6.4–8.2)
PROT UR STRIP.AUTO-MCNC: NEGATIVE MG/DL
RBC # BLD AUTO: 4.61 X10*6/UL (ref 4–5.2)
RBC # UR STRIP.AUTO: NEGATIVE /UL
SODIUM SERPL-SCNC: 137 MMOL/L (ref 136–145)
SP GR UR STRIP.AUTO: 1.02
T VAGINALIS SPEC QL WET PREP: NORMAL
UROBILINOGEN UR STRIP.AUTO-MCNC: <2 MG/DL
WBC # BLD AUTO: 12.2 X10*3/UL (ref 4.4–11.3)
WBC VAG QL WET PREP: NORMAL
YEAST VAG QL WET PREP: NORMAL

## 2023-10-15 PROCEDURE — 82248 BILIRUBIN DIRECT: CPT | Mod: CMCLAB | Performed by: EMERGENCY MEDICINE

## 2023-10-15 PROCEDURE — 81003 URINALYSIS AUTO W/O SCOPE: CPT | Performed by: PHYSICIAN ASSISTANT

## 2023-10-15 PROCEDURE — 36415 COLL VENOUS BLD VENIPUNCTURE: CPT | Mod: CMCLAB | Performed by: PHYSICIAN ASSISTANT

## 2023-10-15 PROCEDURE — 96361 HYDRATE IV INFUSION ADD-ON: CPT

## 2023-10-15 PROCEDURE — 87800 DETECT AGNT MULT DNA DIREC: CPT | Performed by: PHYSICIAN ASSISTANT

## 2023-10-15 PROCEDURE — 96374 THER/PROPH/DIAG INJ IV PUSH: CPT

## 2023-10-15 PROCEDURE — 2500000004 HC RX 250 GENERAL PHARMACY W/ HCPCS (ALT 636 FOR OP/ED): Mod: SE | Performed by: PHYSICIAN ASSISTANT

## 2023-10-15 PROCEDURE — 87210 SMEAR WET MOUNT SALINE/INK: CPT | Mod: CMCLAB | Performed by: PHYSICIAN ASSISTANT

## 2023-10-15 PROCEDURE — 85025 COMPLETE CBC W/AUTO DIFF WBC: CPT | Performed by: PHYSICIAN ASSISTANT

## 2023-10-15 PROCEDURE — 99284 EMERGENCY DEPT VISIT MOD MDM: CPT | Mod: 25

## 2023-10-15 PROCEDURE — 99285 EMERGENCY DEPT VISIT HI MDM: CPT | Performed by: PHYSICIAN ASSISTANT

## 2023-10-15 PROCEDURE — 76815 OB US LIMITED FETUS(S): CPT | Mod: GC | Performed by: RADIOLOGY

## 2023-10-15 PROCEDURE — 84100 ASSAY OF PHOSPHORUS: CPT | Mod: CMCLAB | Performed by: PHYSICIAN ASSISTANT

## 2023-10-15 PROCEDURE — 76815 OB US LIMITED FETUS(S): CPT

## 2023-10-15 RX ORDER — ONDANSETRON HYDROCHLORIDE 2 MG/ML
4 INJECTION, SOLUTION INTRAVENOUS ONCE
Status: COMPLETED | OUTPATIENT
Start: 2023-10-15 | End: 2023-10-15

## 2023-10-15 RX ADMIN — SODIUM CHLORIDE 1000 ML: 0.9 INJECTION, SOLUTION INTRAVENOUS at 15:05

## 2023-10-15 RX ADMIN — ONDANSETRON 4 MG: 2 INJECTION INTRAMUSCULAR; INTRAVENOUS at 15:05

## 2023-10-15 ASSESSMENT — COLUMBIA-SUICIDE SEVERITY RATING SCALE - C-SSRS
6. HAVE YOU EVER DONE ANYTHING, STARTED TO DO ANYTHING, OR PREPARED TO DO ANYTHING TO END YOUR LIFE?: NO
2. HAVE YOU ACTUALLY HAD ANY THOUGHTS OF KILLING YOURSELF?: NO
1. IN THE PAST MONTH, HAVE YOU WISHED YOU WERE DEAD OR WISHED YOU COULD GO TO SLEEP AND NOT WAKE UP?: NO

## 2023-10-15 ASSESSMENT — PAIN - FUNCTIONAL ASSESSMENT: PAIN_FUNCTIONAL_ASSESSMENT: 0-10

## 2023-10-15 ASSESSMENT — PAIN SCALES - GENERAL: PAINLEVEL_OUTOF10: 10 - WORST POSSIBLE PAIN

## 2023-10-15 NOTE — ED TRIAGE NOTES
14 weeks OB due date 24  c/o abd cramping that started last night, states it felt like she was having contractions

## 2023-10-15 NOTE — ED PROVIDER NOTES
Patient signed out to me by previous provider. Please see previous note, HPI, physical exam and course of stay.    Chief Complaint   Patient presents with    Abdominal Pain    Abdominal Cramping     Scooter Granger presented to Emergency Department  for abdominal pain.  Medical work up included labs, diagnostic testing.  CBC with differential shows slightly elevated WBC at 12.2 otherwise unremarkable.  Ultrasound shows single live intrauterine gestation at 15 weeks, 1 day, with fetal heart rate at 151, normal right ovary, left ovary not visualized.  OB consult pending.    Labs  Labs Reviewed   CBC WITH AUTO DIFFERENTIAL - Abnormal       Result Value    WBC 12.2 (*)     nRBC 0.0      RBC 4.61      Hemoglobin 12.6      Hematocrit 37.9      MCV 82      MCH 27.3      MCHC 33.2      RDW 14.1      Platelets 303      MPV 9.0      Neutrophils % 74.9      Immature Granulocytes %, Automated 0.6      Lymphocytes % 16.7      Monocytes % 6.0      Eosinophils % 1.3      Basophils % 0.5      Neutrophils Absolute 9.13 (*)     Immature Granulocytes Absolute, Automated 0.07      Lymphocytes Absolute 2.03      Monocytes Absolute 0.73      Eosinophils Absolute 0.16      Basophils Absolute 0.06     URINALYSIS WITH REFLEX MICROSCOPIC AND CULTURE - Abnormal    Color, Urine Yellow      Appearance, Urine Hazy (*)     Specific Gravity, Urine 1.018      pH, Urine 6.0      Protein, Urine NEGATIVE      Glucose, Urine NEGATIVE      Blood, Urine NEGATIVE      Ketones, Urine 5 (TRACE) (*)     Bilirubin, Urine NEGATIVE      Urobilinogen, Urine <2.0      Nitrite, Urine NEGATIVE      Leukocyte Esterase, Urine NEGATIVE     RENAL FUNCTION PANEL - Normal    Glucose 82      Sodium 137      Potassium 3.6      Chloride 103      Bicarbonate 23      Anion Gap 15      Urea Nitrogen 7      Creatinine 0.51      eGFR >90      Calcium 9.8      Phosphorus 4.5      Albumin 4.2     WET PREP, GENITAL    Trichomonas None Seen      Clue Cells None Seen      Yeast  None Seen      WBC 3-8     URINE GRAY TUBE    Extra Tube Hold for add-ons.     C. TRACHOMATIS + N. GONORRHOEAE, AMPLIFIED   URINALYSIS WITH REFLEX MICROSCOPIC AND CULTURE    Narrative:     The following orders were created for panel order Urinalysis with Reflex Microscopic and Culture.  Procedure                               Abnormality         Status                     ---------                               -----------         ------                     Urinalysis with Reflex M...[[id number]]  Abnormal            Final result               Extra Urine Gray Tube[[id number]]                                                         Please view results for these tests on the individual orders.   EXTRA URINE GRAY TUBE     Imaging  US OB limited 1+ fetuses   Final Result   1. Single live intrauterine gestation corresponding to 15 weeks, 1   days, +/-7. Evaluation of fetal anatomy was not performed. Recommend   continued routine follow-up prenatal imaging evaluation.   2. Normal right ovary. The left ovary is not visualized.        I personally reviewed the images/study and I agree with the findings   as stated. This study was interpreted at [place], [place].        MACRO:   None        Signed by: Karthik Sinclair 10/15/2023 6:39 PM   Dictation workstation:   [id number]         What occured after transition of care:  Patient evaluated by Dr. Yarely Goldsmith with OB, has low suspicions of uterine causing discomfort.  She does recommend patient have CT abdomen/pelvis for appendicitis rule out and follow-up on outpatient basis with OB.  Discussed with patient benefits and risk of obtaining CT to rule out appendicitis.  Patient states will follow-up on Tuesday with OB and will have imaging completed outpatient if necessary.  Plan discussed with attending physician Dr.Kiran Fountain.  I discussed the differential, results and discharge plan with the patient. I emphasized  the importance of follow-up with the physician I referred them to in the time frame recommended. I explained reasons for the patient to return to the clinic. Questions were addressed. They understand return precautions and discharge instructions. The patient expressed understanding, agreed with plan of care and left in stable condition.  Case review with Dr.Kiran Essie Rodriguez, APRN-CNP  10/15/23 [id number]

## 2023-10-15 NOTE — ED PROVIDER NOTES
I performed a history and physical examination of Scooter Granger and discussed her management with Charlene ARELLANO.  I agree with the history, physical, assessment, and plan of care, with the following exceptions: None    I was present for the following procedures: None  Time Spent in Critical Care of the patient: None  Time spent in discussions with the patient and family: 31    This is a -0-1-3 who presents with lower abdominal pain.  Patient had a tubal ligation , states she was sexually active 3 days before .  After tubal ligation, patient was found to be pregnant.  She has a history of complicated prior pregnancy and delivery specifically with uterine prolapse that was fixed transvaginally.  She denies any vaginal bleeding or vaginal discharge.  States she does have significant mild abdominal pain in the lower abdomen, she was laying / writhing on the floor last night at home.  She notes nausea but no vomiting.  Has been able to tolerate p.o.  On my exam patient was well-appearing in no acute distress.  Mildly diffusely tender to palpation but no peritoneal signs.  OB/GYN has been consulted and but they had not evaluated the patient at this time ().  I re-paged them again, they reviewed patient's history as well as ultrasound.  will come down and evaluate the patient and provide final recommendations     OB/GYN resident at bedside    Jeremy Fountain MD MPH       Jeremy Fountain MD MPH  10/15/23 1949       Jeremy Fountain MD MPH  10/15/23 2024

## 2023-10-15 NOTE — ED PROVIDER NOTES
Emergency Department Encounter  [facility] EMERGENCY MEDICINE    Patient: Scooter Granger  MRN: [id number]  : 2000  Date of Evaluation: 10/15/2023  ED Provider: Charlene Still PA-C      Chief Complaint       Chief Complaint   Patient presents with    Abdominal Pain    Abdominal Cramping     HPI    Scooter Granger is a 22 yo female, , past medical history of preeclampsia 3x and gestational diabetes who is currently 14 weeks pregnant presenting to the ED with abd pain x 1 day. States that at 0400 she had extreme abdominal pain that felt like contractions or knots in her stomach, associated with diaphoresis, nausea, and vomiting. Currently has lower abdominal pain that radiates to her back. Described as stabbing and remains constant. Currently rated 8/10. Pain is associated with nausea, dizziness, hand tremors, and reflux symptoms. Patient denies urinary symptoms, fever, urge to push, vaginal bleeding, vaginal discharge, chest pain, SOB, LE swelling, and headache. She has tried hot baths and hydration at home without relief. Symptoms aggravated by movement. Denies sick contacts, recent trauma or falls but states her son recently jumped on her stomach. Admits to having [place] during 2 previous pregnancies and 1  at 13-14 weeks. Patient had a tubal ligation on 22 and uterine prolapse repair 23.    ROS:     Review of Systems  14 systems reviewed and otherwise acutely negative except as in the HPI.    Past History     Past Medical History:   Diagnosis Date    Degenerative myopia, bilateral 2020    Bilateral degenerative progressive high myopia    Degenerative myopia, bilateral 2020    Pathologic myopia, both eyes    Encounter for insertion of intrauterine contraceptive device 2019    Encounter for insertion of copper IUD    Encounter for pregnancy test, result negative 10/09/2020    Urine pregnancy test negative    Encounter for pregnancy  test, result positive 12/11/2020    Pregnancy confirmed by positive urine test    Encounter for screening for infections with a predominantly sexual mode of transmission 12/11/2020    Routine screening for STI (sexually transmitted infection)    Encounter for screening for malignant neoplasm of cervix 09/17/2020    Cervical cancer screening    Encounter for surveillance of contraceptives, unspecified 08/01/2019    Encounter for surveillance of contraceptive device    Lattice degeneration of retina, 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    Myopia, unspecified eye 09/10/2015    High myopia    Other specified health status     No pertinent past medical history    Personal history of other infectious and parasitic diseases 12/05/2018    History of herpes labialis    Unspecified chorioretinal scars, 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    Chorioretinal scar, both eyes     Past Surgical History:   Procedure Laterality Date    OTHER SURGICAL HISTORY  08/31/2017    Surg Results Vision Left Eye Central As Expected     Social History     Socioeconomic History    Marital status: Single     Spouse name: None    Number of children: None    Years of education: None    Highest education level: None   Occupational History    None   Tobacco Use    Smoking status: None    Smokeless tobacco: None   Substance and Sexual Activity    Alcohol use: None    Drug use: None    Sexual activity: None   Other Topics Concern    None   Social History Narrative    None     Social Determinants of Health     Financial Resource Strain: Not on file   Food Insecurity: Not on file   Transportation Needs: Not on file   Physical Activity: Not on file   Stress: Not on file   Social Connections: Not on file   Intimate Partner Violence: Not on file       Medications/Allergies     Previous Medications    ACETAMINOPHEN (TYLENOL) 500 MG CAPSULE    TAKE 2 CAPSULES BY MOUTH EVERY 6 HOURS    ASPIRIN 81 MG CHEWABLE TABLET    CHEW 2 TABLETS BY MOUTH ONCE  DAILY    KETOROLAC (TORADOL) 10 MG TABLET    TAKE 1 TABLET BY MOUTH EVERY 6 HOURS    POLYETHYLENE GLYCOL (GLYCOLAX, MIRALAX) 17 GRAM/DOSE POWDER    MIX 17 GRAMS WITH LIQUID AND DRINK ONCE DAILY    PRENATAL VITAMIN, IRON-FOLIC, 27 MG IRON-800 MCG FOLIC ACID TABLET    TAKE 1 TABLET BY MOUTH ONCE DAILY    QUETIAPINE XR (SEROQUEL XR) 50 MG 24 HR TABLET    TAKE 1 TABLET DAILY.    TRAMADOL (ULTRAM) 50 MG TABLET    TAKE 1 TABLET BY MOUTH EVERY 6 HOURS     No Known Allergies     Physical Exam       ED Triage Vitals [10/15/23 1314]   Temp Heart Rate Resp BP   36.8 °C (98.2 °F) 99 18 128/82      SpO2 Temp Source Heart Rate Source Patient Position   98 % Tympanic -- Sitting      BP Location FiO2 (%)     Left arm --         Physical Exam    Physical Exam:    VS: As documented in the triage note and EMR flowsheet from this visit were reviewed.    Appearance: Alert, oriented, cooperative, in no acute distress. Well nourished & well hydrated.    Skin: Atraumatic. Warm, intact and dry.    Neck: Supple, without lymphadenopathy.    Pulmonary: Clear bilaterally with good chest wall excursion. No rales, rhonchi or wheezing. No accessory muscle use or stridor.     Cardiac: Normal S1, S2 without murmur, rub, gallop or extrasystole.     Abdomen: Soft, slightly distended with active bowel sounds. Discomfort with palpation of entire abdomen with deep palpation. Negative Hurd's sign. No point tenderness at McBurney's point, negative Rovsing and Psoas sign. No rebound or guarding. No CVA tenderness.    Genitourinary: Chaperone present. External exam unremarkable - no evidence of lesions, vesicles. Internal speculum reveals closed cervical os. No active bleeding; +small amount of thin white vaginal discharge. No cervical and/or adnexal tenderness to palpation on bimanual exam.     Musculoskeletal: Spontaneously moving all extremities without limitation. Extremities warm and well-perfused.    Neurological:  Cranial nerves II through XII are  grossly intact.      Diagnostics   Labs:  Results for orders placed or performed during the hospital encounter of 10/15/23   Wet Prep, Genital   Result Value Ref Range    Trichomonas None Seen None Seen    Clue Cells None Seen None Seen    Yeast None Seen None Seen    WBC 3-8    CBC and Auto Differential   Result Value Ref Range    WBC 12.2 (H) 4.4 - 11.3 x10*3/uL    nRBC 0.0 0.0 - 0.0 /100 WBCs    RBC 4.61 4.00 - 5.20 x10*6/uL    Hemoglobin 12.6 12.0 - 16.0 g/dL    Hematocrit 37.9 36.0 - 46.0 %    MCV 82 80 - 100 fL    MCH 27.3 26.0 - 34.0 pg    MCHC 33.2 32.0 - 36.0 g/dL    RDW 14.1 11.5 - 14.5 %    Platelets 303 150 - 450 x10*3/uL    MPV 9.0 7.5 - 11.5 fL    Neutrophils % 74.9 40.0 - 80.0 %    Immature Granulocytes %, Automated 0.6 0.0 - 0.9 %    Lymphocytes % 16.7 13.0 - 44.0 %    Monocytes % 6.0 2.0 - 10.0 %    Eosinophils % 1.3 0.0 - 6.0 %    Basophils % 0.5 0.0 - 2.0 %    Neutrophils Absolute 9.13 (H) 1.20 - 7.70 x10*3/uL    Immature Granulocytes Absolute, Automated 0.07 0.00 - 0.70 x10*3/uL    Lymphocytes Absolute 2.03 1.20 - 4.80 x10*3/uL    Monocytes Absolute 0.73 0.10 - 1.00 x10*3/uL    Eosinophils Absolute 0.16 0.00 - 0.70 x10*3/uL    Basophils Absolute 0.06 0.00 - 0.10 x10*3/uL   Renal function panel   Result Value Ref Range    Glucose 82 74 - 99 mg/dL    Sodium 137 136 - 145 mmol/L    Potassium 3.6 3.5 - 5.3 mmol/L    Chloride 103 98 - 107 mmol/L    Bicarbonate 23 21 - 32 mmol/L    Anion Gap 15 10 - 20 mmol/L    Urea Nitrogen 7 6 - 23 mg/dL    Creatinine 0.51 0.50 - 1.05 mg/dL    eGFR >90 >60 mL/min/1.73m*2    Calcium 9.8 8.6 - 10.6 mg/dL    Phosphorus 4.5 2.5 - 4.9 mg/dL    Albumin 4.2 3.4 - 5.0 g/dL   Urinalysis with Reflex Microscopic and Culture   Result Value Ref Range    Color, Urine Yellow Straw, Yellow    Appearance, Urine Hazy (N) Clear    Specific Gravity, Urine 1.018 1.005 - 1.035    pH, Urine 6.0 5.0, 5.5, 6.0, 6.5, 7.0, 7.5, 8.0    Protein, Urine NEGATIVE NEGATIVE mg/dL    Glucose, Urine  "NEGATIVE NEGATIVE mg/dL    Blood, Urine NEGATIVE NEGATIVE    Ketones, Urine 5 (TRACE) (A) NEGATIVE mg/dL    Bilirubin, Urine NEGATIVE NEGATIVE    Urobilinogen, Urine <2.0 <2.0 mg/dL    Nitrite, Urine NEGATIVE NEGATIVE    Leukocyte Esterase, Urine NEGATIVE NEGATIVE   Urine Gray Tube   Result Value Ref Range    Extra Tube Hold for add-ons.      Radiographs:  US OB limited 1+ fetuses               ED Course   Visit Vitals  /68   Pulse 88   Temp 36.8 °C (98.2 °F) (Tympanic)   Resp 18   Ht 1.676 m (5' 6\")   Wt 89.4 kg (197 lb)   SpO2 97%   BMI 31.80 kg/m²   OB Status Pregnant   BSA 2.04 m²       Medications   sodium chloride 0.9 % bolus 1,000 mL (0 mL intravenous Stopped 10/15/23 1605)   ondansetron (Zofran) injection 4 mg (4 mg intravenous Given 10/15/23 1505)       Medical Decision Making   Based on history and physical, basic labs obtained.  I did speak with OB/GYN who will see the patient after her ultrasound has been performed.  Wet prep unremarkable.  Slight leukocytosis without immature granulocytosis.  Normal renal function.  UA noninfected.  Ultrasound has been performed, pending final read.  GYN is aware that the patient has returned to the emergency department and is awaiting their evaluation for final disposition.      Final Impression    No diagnosis found.      DISPOSITION  Disposition: Discharge  Patient condition is: Stable    Comment: Please note this report has been produced using speech recognition software and may contain errors related to that system including errors in grammar, punctuation, and spelling, as well as words and phrases that may be inappropriate.  If there are any questions or concerns please feel free to contact the dictating provider for clarification.    ARNIE Stone PA-C  10/15/23 5212    "

## 2023-10-16 LAB
C TRACH RRNA SPEC QL NAA+PROBE: NEGATIVE
N GONORRHOEA DNA SPEC QL PROBE+SIG AMP: NEGATIVE

## 2023-10-16 NOTE — PROGRESS NOTES
"  Scooter Granger is a 23 y.o. female who presents today for abdominal pain. Patient is currently 14 weeks pregnant. She had a tubal ligation 7/26/2023, states she was sexually active 3 days before. After tubal ligation she was found to be pregnant. This is her third pregnancy. She has a history of complicated prior pregnancy and delivery specifically with uterine prolapse that was fixed transvaginally.     She now reports a 3 day history of abdominal pain. She states it started suddenly Saturday early morning and woke her up from sleep around 4 am. Pain felt \"tight\" around abdomen and radiated to back. She was hunched over and sweating. This lasted for a few hours then went away. Then Sunday the pain returned and was severe all day. She is unsure if she had a fever. She was nauseated but did not vomit. She went to the ER Sunday. There was mild leukocytosis. Urine negative. OB said not related to the pregnancy. She was going to get a CT scan but states she did not stay for this because she had to return home to her 7 month old baby.    Today, she states her pain persists but not as severe. She states her abdomen \"just doesn't feel right\". She is unable to tolerate sleeping on her stomach and it is tender to touch. She is no longer nauseated. She now feels hungry and wants to eat. She has some constipation since the pain started, may go 1-2 days without a bowel movement. No rectal bleeding. She does not take NSAIDS regularly. No prior EGD or colonoscopy.    Social history: No tobacco. Denies alcohol and illicit drugs.    Family history: Denies family history of colon cancer or other GI disorders or malignancy.     Past Medical History:   Diagnosis Date    Degenerative myopia, 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    Bilateral degenerative progressive high myopia    Degenerative myopia, 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    Pathologic myopia, both eyes    Encounter for insertion of intrauterine contraceptive device 08/07/2019    Encounter for " "insertion of copper IUD    Encounter for pregnancy test, result negative 10/09/2020    Urine pregnancy test negative    Encounter for pregnancy test, result positive 12/11/2020    Pregnancy confirmed by positive urine test    Encounter for screening for infections with a predominantly sexual mode of transmission 12/11/2020    Routine screening for STI (sexually transmitted infection)    Encounter for screening for malignant neoplasm of cervix 09/17/2020    Cervical cancer screening    Encounter for surveillance of contraceptives, unspecified 08/01/2019    Encounter for surveillance of contraceptive device    Lattice degeneration of retina, 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    Myopia, unspecified eye 09/10/2015    High myopia    Other specified health status     No pertinent past medical history    Personal history of other infectious and parasitic diseases 12/05/2018    History of herpes labialis    Unspecified chorioretinal scars, 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     No Known Allergies    No family history on file.    Review of Systems  Review of Systems negative except as noted in HPI.    Objective   /65   Pulse 104   Temp 36.4 °C (97.6 °F)   Resp 18   Ht 1.651 m (5' 5\")   Wt 88.7 kg (195 lb 9.6 oz)   SpO2 99%   BMI 32.55 kg/m²      Physical Exam  Constitutional:  No acute distress. Normal appearance. Not ill-appearing.  HENT:  Head normocephalic and atraumatic. Conjunctivae " normal.  Cardiovascular:  Tachycardiac rate. Regular rhythm.  Pulmonary:  Pulmonary effort normal. No respiratory distress. Breath sounds clear.  Abdominal:  Abdomen is soft. There is no distension. She has upper and mid-abdominal tenderness with palpation.  Skin: Dry.  Neurological:  Alert and oriented.  Psychiatric:  Mood and affect normal.    Assessment/Plan     23 y.o. female who is currently pregnant who presents today for clinic visit for 3 day history of sudden onset mid-abdominal pain that radiates to back, concerning for appendicitis. She recently had mild leukocytosis, is slightly tachycardic, and has significant abdominal tenderness on exam today. We discussed ER for further evaluation as outpatient imaging may take several days to a week to get results. She is agreeable.    Recommendations  Recommend ER for ultrasound to rule out appendicitis.   Follow up with OB as scheduled on 10/19/2023.    Electronically signed by: Renetta Boston CNP on 10/17/2023 at 11:33 AM

## 2023-10-16 NOTE — CONSULTS
Reason for Consult: abd pain in pregnancy    Assessment/Plan    22 yo  at 14.3wga by 8w US presents to ED for abdominal pain.     Abdominal pain in pregnancy  - Abd exam benign. US without acute findings, though L ovary not well visualized. Normal IUP. Labs wnl, though recommend adding on LFTs  - Unlikely that abd pain is related to pregnancy. Given nausea, sudden onset, consider gastritis vs appendicitis, though lower concern for appendicitis given afebrile, VS wnl, WBC count wnl for pregnancy, benign exam. Discussed with patient r/b of CT to r/o appendicitis, but pt needs to leave for childcare  - Discussed return precautions including fevers, worsening pain/nausea  - Plan to follow up in [place] [facility] early this week    D/w Dr. Bruce Goldsmith MD  PGY-3, Obstetrics and Gynecology    Subjective   22 yo  at 14.3wga by 8w US presents to ED for abdominal pain. Woke up out of sleep last night with severe abd pain, severe nausea. No vomiting. Does report eating chili cheese fries last night. No urinary complaints. Has been having normal bowel movements. No vaginal bleeding, LOF, abn discharge.     Obstetrical History     at 35.0, PPROM, sPEC, PPH w/ manual removal of placenta    at 36.3, IOL for sPEC, GDM    at 35.4, IOL for sPEC, GDM  1x AB    Gyn History  S/p lap BS in 2023. Denies hx STIs.  pap NILM HPV neg    Past Medical History  Degenerative myopia     Past Surgical History  Laparoscopic bilateral salpingectomy 2023. Prolapse surgery (sacrospinous fixation, anterior/posterior repair) 2023.    Social History  No T/E/I use    Allergies  Patient has no known allergies.     Objective    Last Vitals  Temp Pulse Resp BP MAP O2 Sat   36.8 °C (98.2 °F) 88 18 124/65   97 %     Physical Examination  Physical Exam  Constitutional:       General: She is not in acute distress.     Appearance: Normal appearance.   HENT:      Head: Normocephalic and atraumatic.       Nose: Nose normal.      Mouth/Throat:      Mouth: Mucous membranes are moist.   Eyes:      Extraocular Movements: Extraocular movements intact.      Conjunctiva/sclera: Conjunctivae normal.   Pulmonary:      Effort: Pulmonary effort is normal.   Abdominal:      General: There is no distension.      Palpations: Abdomen is soft. There is no mass.      Tenderness: There is abdominal tenderness (mild, guanako-umbilical/mid-abdominal). There is right CVA tenderness and left CVA tenderness. There is no guarding or rebound.   Musculoskeletal:         General: Normal range of motion.      Cervical back: Normal range of motion.   Skin:     General: Skin is warm and dry.      Findings: No bruising or rash.   Neurological:      General: No focal deficit present.      Mental Status: She is alert.   Psychiatric:         Mood and Affect: Mood normal.         Behavior: Behavior normal.         Lab Review  Labs and imaging reviewed

## 2023-10-17 ENCOUNTER — OFFICE VISIT (OUTPATIENT)
Dept: GASTROENTEROLOGY | Facility: HOSPITAL | Age: 23
End: 2023-10-17
Payer: MEDICAID

## 2023-10-17 ENCOUNTER — HOSPITAL ENCOUNTER (EMERGENCY)
Facility: HOSPITAL | Age: 23
Discharge: HOME | End: 2023-10-17
Payer: MEDICAID

## 2023-10-17 VITALS
HEIGHT: 65 IN | WEIGHT: 195.6 LBS | HEART RATE: 104 BPM | OXYGEN SATURATION: 99 % | BODY MASS INDEX: 32.59 KG/M2 | SYSTOLIC BLOOD PRESSURE: 107 MMHG | TEMPERATURE: 97.6 F | RESPIRATION RATE: 18 BRPM | DIASTOLIC BLOOD PRESSURE: 65 MMHG

## 2023-10-17 VITALS
TEMPERATURE: 97.7 F | RESPIRATION RATE: 16 BRPM | DIASTOLIC BLOOD PRESSURE: 71 MMHG | SYSTOLIC BLOOD PRESSURE: 115 MMHG | HEART RATE: 98 BPM

## 2023-10-17 DIAGNOSIS — R10.9 ABDOMINAL PAIN, UNSPECIFIED ABDOMINAL LOCATION: Primary | ICD-10-CM

## 2023-10-17 PROCEDURE — 99214 OFFICE O/P EST MOD 30 MIN: CPT | Performed by: NURSE PRACTITIONER

## 2023-10-17 PROCEDURE — 4500999001 HC ED NO CHARGE

## 2023-10-17 PROCEDURE — 1036F TOBACCO NON-USER: CPT | Performed by: NURSE PRACTITIONER

## 2023-10-17 PROCEDURE — 99281 EMR DPT VST MAYX REQ PHY/QHP: CPT

## 2023-10-17 PROCEDURE — 99204 OFFICE O/P NEW MOD 45 MIN: CPT | Performed by: NURSE PRACTITIONER

## 2023-10-17 ASSESSMENT — PATIENT HEALTH QUESTIONNAIRE - PHQ9
10. IF YOU CHECKED OFF ANY PROBLEMS, HOW DIFFICULT HAVE THESE PROBLEMS MADE IT FOR YOU TO DO YOUR WORK, TAKE CARE OF THINGS AT HOME, OR GET ALONG WITH OTHER PEOPLE: SOMEWHAT DIFFICULT
1. LITTLE INTEREST OR PLEASURE IN DOING THINGS: SEVERAL DAYS
1. LITTLE INTEREST OR PLEASURE IN DOING THINGS: SEVERAL DAYS
2. FEELING DOWN, DEPRESSED OR HOPELESS: SEVERAL DAYS
2. FEELING DOWN, DEPRESSED OR HOPELESS: SEVERAL DAYS
SUM OF ALL RESPONSES TO PHQ9 QUESTIONS 1 & 2: 2
SUM OF ALL RESPONSES TO PHQ9 QUESTIONS 1 AND 2: 2
10. IF YOU CHECKED OFF ANY PROBLEMS, HOW DIFFICULT HAVE THESE PROBLEMS MADE IT FOR YOU TO DO YOUR WORK, TAKE CARE OF THINGS AT HOME, OR GET ALONG WITH OTHER PEOPLE: SOMEWHAT DIFFICULT

## 2023-10-17 ASSESSMENT — COLUMBIA-SUICIDE SEVERITY RATING SCALE - C-SSRS
1. IN THE PAST MONTH, HAVE YOU WISHED YOU WERE DEAD OR WISHED YOU COULD GO TO SLEEP AND NOT WAKE UP?: NO
6. HAVE YOU EVER DONE ANYTHING, STARTED TO DO ANYTHING, OR PREPARED TO DO ANYTHING TO END YOUR LIFE?: NO
2. HAVE YOU ACTUALLY HAD ANY THOUGHTS OF KILLING YOURSELF?: NO

## 2023-10-17 ASSESSMENT — LIFESTYLE VARIABLES
HOW OFTEN DO YOU HAVE A DRINK CONTAINING ALCOHOL: NEVER
AUDIT-C TOTAL SCORE: 0
HOW OFTEN DO YOU HAVE SIX OR MORE DRINKS ON ONE OCCASION: NEVER
SKIP TO QUESTIONS 9-10: 1
HOW MANY STANDARD DRINKS CONTAINING ALCOHOL DO YOU HAVE ON A TYPICAL DAY: PATIENT DOES NOT DRINK

## 2023-10-17 ASSESSMENT — ENCOUNTER SYMPTOMS
OCCASIONAL FEELINGS OF UNSTEADINESS: 0
LOSS OF SENSATION IN FEET: 0
DEPRESSION: 0

## 2023-10-17 ASSESSMENT — PAIN SCALES - GENERAL: PAINLEVEL: 0-NO PAIN

## 2023-10-17 NOTE — PATIENT INSTRUCTIONS
Recommendations  Recommend ER for ultrasound to rule out appendicitis (may not want to get CT scan due to your pregnancy).  Follow up with OB on as scheduled 10/19.

## 2023-10-18 PROBLEM — H52.203 ASTIGMATISM OF BOTH EYES: Status: ACTIVE | Noted: 2023-10-18

## 2023-10-18 PROBLEM — O21.9 NAUSEA AND VOMITING DURING PREGNANCY (HHS-HCC): Status: ACTIVE | Noted: 2023-10-18

## 2023-10-18 PROBLEM — F43.10 PTSD (POST-TRAUMATIC STRESS DISORDER): Status: ACTIVE | Noted: 2023-10-18

## 2023-10-18 PROBLEM — H52.13 HIGH MYOPIA, BILATERAL: Status: ACTIVE | Noted: 2023-10-18

## 2023-10-18 PROBLEM — Z86.32 HISTORY OF GESTATIONAL DIABETES: Status: ACTIVE | Noted: 2023-10-18

## 2023-10-18 PROBLEM — F33.3: Status: ACTIVE | Noted: 2023-10-18

## 2023-10-18 PROBLEM — H35.413 LATTICE DEGENERATION OF PERIPHERAL RETINA, BILATERAL: Status: ACTIVE | Noted: 2023-10-18

## 2023-10-18 RX ORDER — PNV 119/IRON FUM/FOLIC ACID 29 MG-1 MG
1 TABLET ORAL DAILY
COMMUNITY
Start: 2022-09-14 | End: 2024-01-16 | Stop reason: HOSPADM

## 2023-10-18 RX ORDER — PYRIDOXINE HCL (VITAMIN B6) 25 MG
1 TABLET ORAL 3 TIMES DAILY
COMMUNITY
Start: 2022-10-13 | End: 2024-01-16 | Stop reason: HOSPADM

## 2023-10-18 RX ORDER — LIDOCAINE 50 MG/G
1 PATCH TOPICAL DAILY
COMMUNITY
Start: 2021-01-26 | End: 2024-01-16 | Stop reason: HOSPADM

## 2023-10-18 RX ORDER — DOCUSATE SODIUM 100 MG/1
1 CAPSULE, LIQUID FILLED ORAL 2 TIMES DAILY PRN
COMMUNITY
Start: 2021-06-09 | End: 2024-01-02 | Stop reason: ALTCHOICE

## 2023-10-18 RX ORDER — LABETALOL 200 MG/1
1 TABLET, FILM COATED ORAL 2 TIMES DAILY
COMMUNITY
Start: 2021-06-09 | End: 2024-01-16 | Stop reason: HOSPADM

## 2023-10-19 ENCOUNTER — ROUTINE PRENATAL (OUTPATIENT)
Dept: OBSTETRICS AND GYNECOLOGY | Facility: CLINIC | Age: 23
End: 2023-10-19
Payer: MEDICAID

## 2023-10-19 ENCOUNTER — LAB (OUTPATIENT)
Dept: LAB | Facility: LAB | Age: 23
End: 2023-10-19
Payer: MEDICAID

## 2023-10-19 VITALS — WEIGHT: 197.3 LBS | BODY MASS INDEX: 32.83 KG/M2 | SYSTOLIC BLOOD PRESSURE: 115 MMHG | DIASTOLIC BLOOD PRESSURE: 69 MMHG

## 2023-10-19 DIAGNOSIS — R10.9 ABDOMINAL PAIN IN PREGNANCY, SECOND TRIMESTER (HHS-HCC): Primary | ICD-10-CM

## 2023-10-19 DIAGNOSIS — O26.892 ABDOMINAL PAIN IN PREGNANCY, SECOND TRIMESTER (HHS-HCC): Primary | ICD-10-CM

## 2023-10-19 DIAGNOSIS — R10.9 ABDOMINAL PAIN IN PREGNANCY, SECOND TRIMESTER (HHS-HCC): ICD-10-CM

## 2023-10-19 DIAGNOSIS — R10.9 ABDOMINAL PAIN DURING PREGNANCY IN SECOND TRIMESTER (HHS-HCC): ICD-10-CM

## 2023-10-19 DIAGNOSIS — O26.892 ABDOMINAL PAIN IN PREGNANCY, SECOND TRIMESTER (HHS-HCC): ICD-10-CM

## 2023-10-19 DIAGNOSIS — O26.892 ABDOMINAL PAIN DURING PREGNANCY IN SECOND TRIMESTER (HHS-HCC): ICD-10-CM

## 2023-10-19 DIAGNOSIS — Z86.32 HISTORY OF GESTATIONAL DIABETES: ICD-10-CM

## 2023-10-19 DIAGNOSIS — Z3A.15 15 WEEKS GESTATION OF PREGNANCY (HHS-HCC): ICD-10-CM

## 2023-10-19 LAB
AMYLASE SERPL-CCNC: 43 U/L (ref 29–103)
LIPASE SERPL-CCNC: 22 U/L (ref 9–82)

## 2023-10-19 PROCEDURE — 82677 ASSAY OF ESTRIOL: CPT

## 2023-10-19 PROCEDURE — 99214 OFFICE O/P EST MOD 30 MIN: CPT | Performed by: OBSTETRICS & GYNECOLOGY

## 2023-10-19 PROCEDURE — 83690 ASSAY OF LIPASE: CPT

## 2023-10-19 PROCEDURE — 36415 COLL VENOUS BLD VENIPUNCTURE: CPT

## 2023-10-19 PROCEDURE — 86336 INHIBIN A: CPT

## 2023-10-19 PROCEDURE — 82105 ALPHA-FETOPROTEIN SERUM: CPT

## 2023-10-19 PROCEDURE — 84702 CHORIONIC GONADOTROPIN TEST: CPT

## 2023-10-19 PROCEDURE — 82150 ASSAY OF AMYLASE: CPT

## 2023-10-19 PROCEDURE — 99214 OFFICE O/P EST MOD 30 MIN: CPT | Mod: GC,TH | Performed by: OBSTETRICS & GYNECOLOGY

## 2023-10-19 NOTE — PROGRESS NOTES
"Subjective   Patient ID [id number]   Scooter Granger is a 23 y.o.  at 15w0d with a working estimated date of delivery of 2024, Date entered prior to episode creation who presents for a routine prenatal visit. She denies vaginal bleeding, leakage of fluid, or contractions. Starting to feel small FM.     Pt seen in the ED x2 for severe abdominal pain. Reports pain is primarily over epigastric region, radiates to back. Intermittent. Denies association with foods or eating, denies loss of appetite. Denies fevers, chills. WBC 12.2, labs otherwise wnl. Pt unable to stay for CT scans to r/o appendicitis in the ED due to childcare.   Today, feels pain is improved, almost none at rest. Notes 3/10 only with palpation. Appetite good.     Her pregnancy is complicated by:  Pregnancy Problems (from 10/19/23 to present)       Problem Noted Resolved    15 weeks gestation of pregnancy 10/19/2023 by Zoila Wolf MD No     - PNLs up to date, still needs early 3hr GTT  - h/o PEC x3, on bASA  - h/o sacrospinous fixation and ant/posterior repair for prolapse, s/p discussion with UroGyn   - [facility]: s/p tubal ligation        Abdominal pain during pregnancy in second trimester 10/19/2023 by Zoila Wolf MD No     - s/p ED visits x2, elevated WBC otherwise labs wnl  - CT unable to be completed to r/o appendicitis   - symptoms improved today                Objective   Physical Exam  Weight: 89.5 kg (197 lb 4.8 oz)  Expected Total Weight Gain: Could not be calculated   Pregravid BMI: Could not be calculated  BP: 115/69       Abd soft, tender to palpation over epigastrium     Prenatal Labs  Urine dip:  Lab Results   Component Value Date    KETONESU 5 (TRACE) (A) 10/15/2023       Lab Results   Component Value Date    HGB 12.6 10/15/2023    HCT 37.9 10/15/2023    ABO O 2023    HEPBSAG NONREACTIVE 2023     No results found for: \"PAPPA\", \"AFP\", \"HCG\", \"ESTRIOL\", \"INHBA\"  No results found for: \"GLUF\", \"GLUT1\", \"ARONTTK0YN\", " "\"[id number]\"    Imaging  The most recent ultrasound was performed on 10/15/2023 with a study GA of   and EFW of  .          Assessment/Plan   Problem List Items Addressed This Visit             ICD-10-CM    History of gestational diabetes Z86.32     - has not had 3hr GTT done yet, will wait until resolution of current ongoing pain episode         Postpartum depression F53.0     - not currently on meds  - mood stable, no SI/HI today          15 weeks gestation of pregnancy Z3A.15     - cont bASA  - plan for 3hr GTT after acute abd pain episode   - reviewed discussion with UroGyn. No contraindications to vaginal delivery.   - NT done, would still like genetic screening. Quad screen ordered          Relevant Orders    Quad Screen    Abdominal pain during pregnancy in second trimester O26.892, R10.9     - c/f gallbladder stones, possible pancreatitis - overall improving   - will finish workup with RUQ US, amylase, lipase  - pain improving, pt declines pain options at this time           Other Visit Diagnoses         Codes    Abdominal pain in pregnancy, second trimester    -  Primary O26.892, R10.9    Relevant Orders    Amylase    Lipase    US right upper quadrant          RTC 4 wks for NABEEL visit  Seen and d/w Dr. Alfredo Wolf MD  PGY2, Obstetrics and Gynecology   "

## 2023-10-19 NOTE — ASSESSMENT & PLAN NOTE
- cont bASA  - plan for 3hr GTT after acute abd pain episode   - reviewed discussion with UroGyn. No contraindications to vaginal delivery.   - NT done, would still like genetic screening. Quad screen ordered

## 2023-10-19 NOTE — ASSESSMENT & PLAN NOTE
- c/f gallbladder stones, possible pancreatitis - overall improving   - will finish workup with RUQ US, amylase, lipase  - pain improving, pt declines pain options at this time

## 2023-10-19 NOTE — PROGRESS NOTES
I saw and evaluated the patient. I personally obtained the key and critical portions of the history and physical exam or was physically present for key and critical portions performed by the resident/fellow. I reviewed the resident/fellow's documentation and discussed the patient with the resident/fellow. I agree with the resident/fellow's medical decision making as documented in the note.    Alondra Fuentes MD

## 2023-10-24 ENCOUNTER — PHARMACY VISIT (OUTPATIENT)
Dept: PHARMACY | Facility: CLINIC | Age: 23
End: 2023-10-24
Payer: MEDICAID

## 2023-10-24 ENCOUNTER — TELEPHONE (OUTPATIENT)
Dept: GENETICS | Facility: CLINIC | Age: 23
End: 2023-10-24
Payer: MEDICAID

## 2023-10-24 DIAGNOSIS — B37.9 YEAST INFECTION: Primary | ICD-10-CM

## 2023-10-24 LAB
2ND TRIMESTER 4 SCREEN SERPL-IMP: NORMAL
AFP ADJ MOM SERPL: 0.73
AFP SERPL-MCNC: 18.6 NG/ML
AGE AT DELIVERY: 24.6 YR
FET TS 18 RISK FROM MAT AGE: NORMAL
FET TS 21 RISK FROM MAT AGE: 1048
GA METHOD: NORMAL
GA: 15.1 WEEKS
HCG ADJ MOM SERPL: 0.16
HCG SERPL-ACNC: 7551 MIU/ML
IDDM PATIENT QL: NO
INHIBIN A ADJ MOM SERPL: 0.81
INHIBIN A SERPL-MCNC: 115.32 PG/ML
MULTIPLE PREGNANCY: NO
NEURAL TUBE DEFECT RISK FETUS: NORMAL %
SERVICE CMNT-IMP: NORMAL
TS 18 RISK FETUS: NORMAL
TS 21 RISK FETUS: NORMAL
U ESTRIOL ADJ MOM SERPL: 0.82
U ESTRIOL SERPL-MCNC: 0.57 NG/ML

## 2023-10-24 RX ORDER — FLUCONAZOLE 150 MG/1
150 TABLET ORAL ONCE
Qty: 1 TABLET | Refills: 0 | Status: SHIPPED | OUTPATIENT
Start: 2023-10-24 | End: 2023-10-24

## 2023-10-24 NOTE — TELEPHONE ENCOUNTER
Left message for patient requesting call back to review results of Quad screen. My direct number is [phone].  Laquita Lee C

## 2023-10-24 NOTE — TELEPHONE ENCOUNTER
Patient returned my call to discuss results of her Quad screen. Initial report was resulted on 10/21/23 with an increased risk for Tirsomy 18 as follows:      After review of patient's chart, she had an ultrasound on  09/05/2023 set her final assigned due date as 04/11/2023. So, on 10/20/2023 (collection date), her gestational age was 15 and 1/7 weeks gestation, NOT 16.6 as stated on above report.     I called [facility] to update her due date to 04/11/2023, and they reissued a revised report:      This new report is low risk for Trisomy 18, Trisomy 21, and open spina bifida.   I explained the differences between these reports and the reason for the recalculation. I additionally offered the patient a genetic counseling appointment to discuss further and consider additional screening, which she declined. She expressed understanding of the above information, and stated that she understands the above information.    If additional questions arise, or if there are additional concerns on ultrasound, Ms. Granger can be referred to genetic counseling.    Laquita Lee, [facility]

## 2023-10-31 ENCOUNTER — HOSPITAL ENCOUNTER (OUTPATIENT)
Dept: RADIOLOGY | Facility: HOSPITAL | Age: 23
Discharge: HOME | End: 2023-10-31
Payer: MEDICAID

## 2023-10-31 DIAGNOSIS — R10.9 ABDOMINAL PAIN IN PREGNANCY, SECOND TRIMESTER (HHS-HCC): ICD-10-CM

## 2023-10-31 DIAGNOSIS — O26.892 ABDOMINAL PAIN IN PREGNANCY, SECOND TRIMESTER (HHS-HCC): ICD-10-CM

## 2023-10-31 PROCEDURE — 76705 ECHO EXAM OF ABDOMEN: CPT | Performed by: RADIOLOGY

## 2023-10-31 PROCEDURE — 76705 ECHO EXAM OF ABDOMEN: CPT

## 2023-11-01 ENCOUNTER — APPOINTMENT (OUTPATIENT)
Dept: RADIOLOGY | Facility: CLINIC | Age: 23
End: 2023-11-01
Payer: MEDICAID

## 2023-11-02 ENCOUNTER — ROUTINE PRENATAL (OUTPATIENT)
Dept: OBSTETRICS AND GYNECOLOGY | Facility: CLINIC | Age: 23
End: 2023-11-02
Payer: MEDICAID

## 2023-11-02 ENCOUNTER — PHARMACY VISIT (OUTPATIENT)
Dept: PHARMACY | Facility: CLINIC | Age: 23
End: 2023-11-02
Payer: MEDICAID

## 2023-11-02 ENCOUNTER — ANCILLARY PROCEDURE (OUTPATIENT)
Dept: RADIOLOGY | Facility: CLINIC | Age: 23
End: 2023-11-02
Payer: MEDICAID

## 2023-11-02 VITALS — WEIGHT: 203.5 LBS | DIASTOLIC BLOOD PRESSURE: 88 MMHG | SYSTOLIC BLOOD PRESSURE: 140 MMHG | BODY MASS INDEX: 33.86 KG/M2

## 2023-11-02 DIAGNOSIS — I10 CHRONIC HYPERTENSION: Primary | ICD-10-CM

## 2023-11-02 DIAGNOSIS — Z36.86 ENCOUNTER FOR ANTENATAL SCREENING FOR CERVICAL LENGTH (HHS-HCC): ICD-10-CM

## 2023-11-02 DIAGNOSIS — R73.09 ELEVATED RANDOM BLOOD GLUCOSE LEVEL: ICD-10-CM

## 2023-11-02 PROBLEM — F33.3: Status: RESOLVED | Noted: 2023-10-18 | Resolved: 2023-11-02

## 2023-11-02 PROBLEM — H52.203 ASTIGMATISM OF BOTH EYES: Status: RESOLVED | Noted: 2023-10-18 | Resolved: 2023-11-02

## 2023-11-02 PROBLEM — O10.919 CHRONIC HYPERTENSION AFFECTING PREGNANCY (HHS-HCC): Status: ACTIVE | Noted: 2023-11-02

## 2023-11-02 PROBLEM — F43.10 PTSD (POST-TRAUMATIC STRESS DISORDER): Status: RESOLVED | Noted: 2023-10-18 | Resolved: 2023-11-02

## 2023-11-02 PROBLEM — O28.0 ABNORMAL QUAD SCREEN: Status: ACTIVE | Noted: 2023-11-02

## 2023-11-02 PROBLEM — H52.13 HIGH MYOPIA, BILATERAL: Status: RESOLVED | Noted: 2023-10-18 | Resolved: 2023-11-02

## 2023-11-02 PROBLEM — H35.413 LATTICE DEGENERATION OF PERIPHERAL RETINA, BILATERAL: Status: RESOLVED | Noted: 2023-10-18 | Resolved: 2023-11-02

## 2023-11-02 PROBLEM — F32.9 MAJOR DEPRESSION: Status: ACTIVE | Noted: 2023-10-18

## 2023-11-02 PROBLEM — O21.9 NAUSEA AND VOMITING DURING PREGNANCY (HHS-HCC): Status: RESOLVED | Noted: 2023-10-18 | Resolved: 2023-11-02

## 2023-11-02 PROBLEM — O09.92 SUPERVISION OF HIGH RISK PREGNANCY IN SECOND TRIMESTER (HHS-HCC): Status: ACTIVE | Noted: 2023-10-19

## 2023-11-02 PROCEDURE — 76815 OB US LIMITED FETUS(S): CPT

## 2023-11-02 PROCEDURE — 99214 OFFICE O/P EST MOD 30 MIN: CPT | Mod: GC,TH,25 | Performed by: OBSTETRICS & GYNECOLOGY

## 2023-11-02 PROCEDURE — 76817 TRANSVAGINAL US OBSTETRIC: CPT | Performed by: OBSTETRICS & GYNECOLOGY

## 2023-11-02 PROCEDURE — 76817 TRANSVAGINAL US OBSTETRIC: CPT

## 2023-11-02 PROCEDURE — 76815 OB US LIMITED FETUS(S): CPT | Performed by: OBSTETRICS & GYNECOLOGY

## 2023-11-02 PROCEDURE — RXMED WILLOW AMBULATORY MEDICATION CHARGE

## 2023-11-02 PROCEDURE — 99214 OFFICE O/P EST MOD 30 MIN: CPT | Performed by: OBSTETRICS & GYNECOLOGY

## 2023-11-02 RX ORDER — NEBULIZER AND COMPRESSOR
1 EACH MISCELLANEOUS DAILY
Qty: 1 EACH | Refills: 0 | Status: SHIPPED | OUTPATIENT
Start: 2023-11-02

## 2023-11-02 NOTE — PROGRESS NOTES
OB Follow-up -     HPI  24 yo  @ 17 wga presenting for routine prenatal visit. Denies complaints currently.    Objective   Vitals:    23 0953   BP: 140/88     Assessment/Plan   24 yo  @ 17 wga presenting for routine prenatal visit.    Medical Problems       Problem List       History of gestational diabetes    Overview Signed 2023  3:23 PM by Dylon Song MD     For early 1-hr GTT         Major depression    Overview Signed 10/19/2023 12:45 PM by Zoila Wolf MD     - on seroquel outside of pregnancy, not taking currently         Supervision of high risk pregnancy in second trimester    Overview Addendum 2023 12:18 PM by Dylon Song MD     Dating:   [x] Initial BMI:   [x] Prenatal Labs: reviewed and wnl ()  [x] Aneuploidy Screening: RR First Check  [x] Baby ASA: compliant  [x] Anatomy US: scheduled  [] 1hr GCT at 24-28wks:  [] Tdap (27-36wks):  [] Flu Shot:  [] COVID vaccine:   [] Rhogam (if Rh neg):   [] GBS at 36 wks:  [] Breastfeeding  [] PPBC:   [] 39 weeks discussion of IOL vs. Expectant management:  [] Mode of delivery:           Abdominal pain during pregnancy in second trimester    Overview Signed 10/19/2023 12:53 PM by Zoila Wolf MD     - s/p ED visits x2, elevated WBC otherwise labs wnl  - CT unable to be completed to r/o appendicitis   - symptoms improved today          Chronic hypertension affecting pregnancy    Overview Signed 2023  3:25 PM by Dylon Song MD     BP mild-range today  Not on meds  Rx BP cuff  Pt given logs to track Bps outpatient         Abnormal quad screen    Overview Signed 2023  3:27 PM by Dylon Song MD     Elevated T18 risk  Follow-up anatomy scan             RTC in 4 weeks    Seen and discussed with Dr. Delma Song MD  Obstetrics and Gynecology

## 2023-11-17 ENCOUNTER — ANCILLARY PROCEDURE (OUTPATIENT)
Dept: RADIOLOGY | Facility: CLINIC | Age: 23
End: 2023-11-17
Payer: MEDICAID

## 2023-11-17 DIAGNOSIS — O99.212 OBESITY COMPLICATING PREGNANCY, SECOND TRIMESTER (HHS-HCC): ICD-10-CM

## 2023-11-17 DIAGNOSIS — Z36.3 ENCOUNTER FOR ANTENATAL SCREENING FOR MALFORMATIONS (HHS-HCC): ICD-10-CM

## 2023-11-17 PROCEDURE — 76811 OB US DETAILED SNGL FETUS: CPT

## 2023-11-17 PROCEDURE — 76817 TRANSVAGINAL US OBSTETRIC: CPT

## 2023-11-17 PROCEDURE — 76811 OB US DETAILED SNGL FETUS: CPT | Performed by: OBSTETRICS & GYNECOLOGY

## 2023-11-17 PROCEDURE — 76817 TRANSVAGINAL US OBSTETRIC: CPT | Performed by: OBSTETRICS & GYNECOLOGY

## 2023-11-21 ENCOUNTER — PHARMACY VISIT (OUTPATIENT)
Dept: PHARMACY | Facility: CLINIC | Age: 23
End: 2023-11-21
Payer: MEDICAID

## 2023-11-21 PROCEDURE — RXMED WILLOW AMBULATORY MEDICATION CHARGE

## 2023-12-01 ENCOUNTER — ANCILLARY PROCEDURE (OUTPATIENT)
Dept: RADIOLOGY | Facility: CLINIC | Age: 23
End: 2023-12-01
Payer: MEDICAID

## 2023-12-01 ENCOUNTER — PHARMACY VISIT (OUTPATIENT)
Dept: PHARMACY | Facility: CLINIC | Age: 23
End: 2023-12-01

## 2023-12-01 DIAGNOSIS — Z36.86 ENCOUNTER FOR ANTENATAL SCREENING FOR CERVICAL LENGTH (HHS-HCC): ICD-10-CM

## 2023-12-01 DIAGNOSIS — Z87.51 PERSONAL HISTORY OF PRE-TERM LABOR: ICD-10-CM

## 2023-12-01 PROCEDURE — 76815 OB US LIMITED FETUS(S): CPT | Performed by: OBSTETRICS & GYNECOLOGY

## 2023-12-01 PROCEDURE — 76817 TRANSVAGINAL US OBSTETRIC: CPT | Performed by: OBSTETRICS & GYNECOLOGY

## 2023-12-01 PROCEDURE — 76815 OB US LIMITED FETUS(S): CPT

## 2023-12-01 PROCEDURE — 76817 TRANSVAGINAL US OBSTETRIC: CPT

## 2023-12-01 PROCEDURE — RXOTC WILLOW AMBULATORY OTC CHARGE

## 2023-12-13 LAB — HOLD SPECIMEN: NORMAL

## 2023-12-26 ENCOUNTER — OFFICE VISIT (OUTPATIENT)
Dept: OPHTHALMOLOGY | Facility: CLINIC | Age: 23
End: 2023-12-26
Payer: MEDICAID

## 2023-12-26 DIAGNOSIS — H52.13 SEVERE MYOPIA OF BOTH EYES: ICD-10-CM

## 2023-12-26 DIAGNOSIS — H52.213 IRREGULAR ASTIGMATISM OF BOTH EYES: Primary | ICD-10-CM

## 2023-12-26 DIAGNOSIS — H35.413 BILATERAL RETINAL LATTICE DEGENERATION: ICD-10-CM

## 2023-12-26 PROCEDURE — 92015 DETERMINE REFRACTIVE STATE: CPT | Performed by: OPTOMETRIST

## 2023-12-26 PROCEDURE — 99213 OFFICE O/P EST LOW 20 MIN: CPT | Performed by: OPTOMETRIST

## 2023-12-26 PROCEDURE — RXMED WILLOW AMBULATORY MEDICATION CHARGE

## 2023-12-26 PROCEDURE — 92310 CONTACT LENS FITTING OU: CPT | Performed by: OPTOMETRIST

## 2023-12-26 PROCEDURE — V2521 CNTCT LENS HYDROPHILIC TORIC: HCPCS | Performed by: OPTOMETRIST

## 2023-12-26 ASSESSMENT — REFRACTION_WEARINGRX
OD_SPHERE: -18.75
OS_AXIS: 165
OD_AXIS: 175
OD_CYLINDER: -2.75
OS_CYLINDER: -1.75
OS_SPHERE: -17.50

## 2023-12-26 ASSESSMENT — CUP TO DISC RATIO
OS_RATIO: .3 TILTED
OD_RATIO: .3 TILTED

## 2023-12-26 ASSESSMENT — SLIT LAMP EXAM - LIDS
COMMENTS: GOOD POSITION
COMMENTS: GOOD POSITION

## 2023-12-26 ASSESSMENT — REFRACTION_MANIFEST
OD_CYLINDER: -2.25
OS_AXIS: 165
OS_CYLINDER: -2.00
OS_SPHERE: -17.75
OD_SPHERE: -18.00
OD_AXIS: 175

## 2023-12-26 ASSESSMENT — REFRACTION_CURRENTRX
OD_SPHERE: -15.00
OD_BASECURVE: 8.7
OS_BRAND: BIOFINITY TORIC XR
OS_DIAMETER: 14.5
OD_BASECURVE: 8.6
OD_CYLINDER: -1.25
OD_DIAMETER: 14.2
OS_AXIS: 165
OS_SPHERE: -14.50
OS_BRAND: PROCLEAR
OS_DIAMETER: 14.2
OD_BRAND: BIOFINITY TORIC XR
OD_AXIS: 175
OS_CYLINDER: SPHERE
OD_DIAMETER: 14.5
OS_CYLINDER: -1.25
OS_BASECURVE: 8.6
OD_BRAND: PROCLEAR
OS_BASECURVE: 8.7
OS_SPHERE: -16.00
OD_CYLINDER: SPHERE
OD_SPHERE: -16.00

## 2023-12-26 ASSESSMENT — VISUAL ACUITY
OS_CC: 20/30
OS_CC+: +1
CORRECTION_TYPE: GLASSES
METHOD: SNELLEN - LINEAR
OD_CC+: +1
OD_CC: 20/25

## 2023-12-26 ASSESSMENT — CONF VISUAL FIELD
OS_INFERIOR_NASAL_RESTRICTION: 0
OS_INFERIOR_TEMPORAL_RESTRICTION: 0
OD_SUPERIOR_TEMPORAL_RESTRICTION: 0
OD_INFERIOR_NASAL_RESTRICTION: 0
OS_SUPERIOR_NASAL_RESTRICTION: 0
OS_NORMAL: 1
OD_INFERIOR_TEMPORAL_RESTRICTION: 0
OD_SUPERIOR_NASAL_RESTRICTION: 0
OS_SUPERIOR_TEMPORAL_RESTRICTION: 0
METHOD: COUNTING FINGERS
OD_NORMAL: 1

## 2023-12-26 ASSESSMENT — EXTERNAL EXAM - LEFT EYE: OS_EXAM: NORMAL

## 2023-12-26 ASSESSMENT — EXTERNAL EXAM - RIGHT EYE: OD_EXAM: NORMAL

## 2023-12-26 ASSESSMENT — REFRACTION
OD_SPHERE: -17.75
OS_SPHERE: -17.25
OS_CYLINDER: -2.00
OS_AXIS: 160
OD_AXIS: 175
OD_CYLINDER: -2.25

## 2023-12-26 ASSESSMENT — TONOMETRY
OS_IOP_MMHG: 16
OD_IOP_MMHG: 16
IOP_METHOD: GOLDMANN APPLANATION

## 2023-12-26 ASSESSMENT — ENCOUNTER SYMPTOMS
PSYCHIATRIC NEGATIVE: 0
CARDIOVASCULAR NEGATIVE: 0
RESPIRATORY NEGATIVE: 0
MUSCULOSKELETAL NEGATIVE: 0
GASTROINTESTINAL NEGATIVE: 0
ALLERGIC/IMMUNOLOGIC NEGATIVE: 0
EYES NEGATIVE: 0
NEUROLOGICAL NEGATIVE: 0
HEMATOLOGIC/LYMPHATIC NEGATIVE: 0
CONSTITUTIONAL NEGATIVE: 0
ENDOCRINE NEGATIVE: 0

## 2023-12-26 NOTE — Clinical Note
Both eyes (OU) Contact Lens Order  Quantity: 2 Package: TRIAL Appointment needed? No Medically necessary? Yes Ship To: Home Additional instructions: please order 2 trial pairs of CLs for patient, trying new brand and new power. CL Rx printed for patient today. If she likes salvador, will order year supply ([employer]) and send to patient. Charges in epic, ABN signed.

## 2023-12-26 NOTE — PROGRESS NOTES
Assessment/Plan   Diagnoses and all orders for this visit:  Irregular astigmatism of both eyes  Severe myopia of both eyes  New spec rx released today per patient request. Ocular health wnl for age OU. Monitor 1 year or sooner prn. Refraction billed today.  Discussed proper wear, care, replacement of contact lenses. Gave handout. D/c cl wear and RTC if eyes become red, painful, irritated. Monitor 1 year.   CL eval billed today. $35 as medically necessary w/ year supply of lenses. Trials ordered, if pt likes will order year supply and mail to patient.     Bilateral retinal lattice degeneration  The nature of lattice degeneration was discussed with the patient, including the increased risk of retinal breaks and retinal detachment. A discussion of the evidence regarding prophylactic laser photocoagulation for lattice degeneration to prevent retinal breaks and detachment was given. The patient was advised to return immediately for new flashes or floaters.    Patient's children see Dr. Zheng. Recommend discuss myopia management and if candidate. Discussed some studies may be available.

## 2024-01-02 ENCOUNTER — PHARMACY VISIT (OUTPATIENT)
Dept: PHARMACY | Facility: CLINIC | Age: 24
End: 2024-01-02
Payer: MEDICAID

## 2024-01-02 ENCOUNTER — ROUTINE PRENATAL (OUTPATIENT)
Dept: OBSTETRICS AND GYNECOLOGY | Facility: CLINIC | Age: 24
End: 2024-01-02
Payer: MEDICAID

## 2024-01-02 VITALS — WEIGHT: 207.2 LBS | SYSTOLIC BLOOD PRESSURE: 126 MMHG | DIASTOLIC BLOOD PRESSURE: 87 MMHG | BODY MASS INDEX: 34.48 KG/M2

## 2024-01-02 DIAGNOSIS — O28.0 ABNORMAL QUAD SCREEN: ICD-10-CM

## 2024-01-02 DIAGNOSIS — Z3A.25 25 WEEKS GESTATION OF PREGNANCY (HHS-HCC): Primary | ICD-10-CM

## 2024-01-02 DIAGNOSIS — O09.92 SUPERVISION OF HIGH RISK PREGNANCY IN SECOND TRIMESTER (HHS-HCC): ICD-10-CM

## 2024-01-02 DIAGNOSIS — O26.892 ABDOMINAL PAIN DURING PREGNANCY IN SECOND TRIMESTER (HHS-HCC): ICD-10-CM

## 2024-01-02 DIAGNOSIS — O10.919 CHRONIC HYPERTENSION AFFECTING PREGNANCY (HHS-HCC): ICD-10-CM

## 2024-01-02 DIAGNOSIS — R10.9 ABDOMINAL PAIN DURING PREGNANCY IN SECOND TRIMESTER (HHS-HCC): ICD-10-CM

## 2024-01-02 PROCEDURE — 99214 OFFICE O/P EST MOD 30 MIN: CPT | Mod: TH | Performed by: ADVANCED PRACTICE MIDWIFE

## 2024-01-02 PROCEDURE — 99214 OFFICE O/P EST MOD 30 MIN: CPT | Performed by: ADVANCED PRACTICE MIDWIFE

## 2024-01-02 NOTE — PROGRESS NOTES
Assessment/Plan   Problem List Items Addressed This Visit             ICD-10-CM       Ob-Gyn Problems    Abdominal pain during pregnancy in second trimester O26.892, R10.9    Abnormal quad screen O28.0    Supervision of high risk pregnancy in second trimester O09.92    Relevant Orders    US MAC OB imaging order       Other    Chronic hypertension affecting pregnancy O10.919    Relevant Orders    US MAC OB imaging order     Other Visit Diagnoses         Codes    25 weeks gestation of pregnancy    -  Primary Z3A.25    Relevant Orders    CBC Anemia Panel With Reflex,Pregnancy    Syphilis Screen with Reflex    US MAC OB imaging order            Labs reviewed.  Glucose test between this visit and next  Bps are good at home     Reviewed s/sx of PTL, warning signs, fetal movement counts, and when to call provider  Follow up in 2 weeks for a routine prenatal visit.    PRASANTH Panchal-MIMI Underwood     Scooter Granger is a 23 y.o.  at 25w5d with a working estimated date of delivery of 2024, Date entered prior to episode creation who presents for a routine prenatal visit. She denies vaginal bleeding, leakage of fluid, decreased fetal movements, or contractions.    Her pregnancy is complicated by:  Pregnancy Problems (from 10/19/23 to present)       Problem Noted Resolved    Chronic hypertension affecting pregnancy 2023 by Dylon Song MD No    Overview Signed 2023  3:25 PM by Dylon Song MD     BP mild-range today  Not on meds  Rx BP cuff  Pt given logs to track Bps outpatient         Abnormal quad screen 2023 by Dylon Song MD No    Overview Signed 2023  3:27 PM by Dylon Song MD     Elevated T18 risk  Follow-up anatomy scan         Supervision of high risk pregnancy in second trimester 10/19/2023 by Zoila Wolf MD No    Overview Addendum 2023 12:18 PM by Dylon Song MD     Dating:   [x] Initial BMI:   [x] Prenatal Labs: reviewed  and wnl (11/2)  [x] Aneuploidy Screening: RR First Check  [x] Baby ASA: compliant  [x] Anatomy US: scheduled  [] 1hr GCT at 24-28wks:  [] Tdap (27-36wks):  [] Flu Shot:  [] COVID vaccine:   [] Rhogam (if Rh neg): O+  [] GBS at 36 wks:  [] Breastfeeding  [] PPBC:   [] 39 weeks discussion of IOL vs. Expectant management:  [] Mode of delivery:           Abdominal pain during pregnancy in second trimester 10/19/2023 by Zoila Wolf MD No    Overview Signed 10/19/2023 12:53 PM by Zoila Wolf MD     - s/p ED visits x2, elevated WBC otherwise labs wnl  - CT unable to be completed to r/o appendicitis   - symptoms improved today                   Objective   Physical Exam  Weight: 94 kg (207 lb 3.2 oz)  Expected Total Weight Gain: 5 kg (11 lb)-9 kg (19 lb)   Pregravid BMI: 32.78  BP: 126/87 (HR 69)  Fetal Heart Rate: 149 Fundal Height (cm): 27 cm    Postpartum Depression: Not on file       Prenatal Labs  Lab Results   Component Value Date    HGB 12.6 10/15/2023    HCT 37.9 10/15/2023    ABO O 08/31/2023    HEPBSAG NONREACTIVE 08/31/2023     Glucose, 1 Hr Screen, Preg   Date Value Ref Range Status   12/30/2022 211 (A) <135 mg/dL Final     Comment:      Diagnostic value with glucose loading dose of 50 g.   Reference values from American Diabetes Association.   Diabetes Care 2015;38(Suppl.1):S8-S16          Imaging  The most recent ultrasound was performed on The most recent ultrasound study is not finalized with a study GA of The most recent ultrasound study is not finalized and EFW of The most recent ultrasound study is not finalized.  The most recent ultrasound study is not finalized  The most recent ultrasound study is not finalized

## 2024-01-16 ENCOUNTER — HOSPITAL ENCOUNTER (OUTPATIENT)
Facility: HOSPITAL | Age: 24
Discharge: HOME | End: 2024-01-16
Attending: STUDENT IN AN ORGANIZED HEALTH CARE EDUCATION/TRAINING PROGRAM | Admitting: STUDENT IN AN ORGANIZED HEALTH CARE EDUCATION/TRAINING PROGRAM
Payer: MEDICAID

## 2024-01-16 ENCOUNTER — HOSPITAL ENCOUNTER (OUTPATIENT)
Facility: HOSPITAL | Age: 24
End: 2024-01-16
Attending: STUDENT IN AN ORGANIZED HEALTH CARE EDUCATION/TRAINING PROGRAM | Admitting: STUDENT IN AN ORGANIZED HEALTH CARE EDUCATION/TRAINING PROGRAM
Payer: MEDICAID

## 2024-01-16 VITALS
HEIGHT: 65 IN | RESPIRATION RATE: 18 BRPM | OXYGEN SATURATION: 98 % | BODY MASS INDEX: 35.02 KG/M2 | SYSTOLIC BLOOD PRESSURE: 120 MMHG | HEART RATE: 101 BPM | WEIGHT: 210.21 LBS | TEMPERATURE: 97.7 F | DIASTOLIC BLOOD PRESSURE: 71 MMHG

## 2024-01-16 DIAGNOSIS — R51.9 PREGNANCY HEADACHE IN SECOND TRIMESTER (HHS-HCC): ICD-10-CM

## 2024-01-16 DIAGNOSIS — Z3A.27 27 WEEKS GESTATION OF PREGNANCY (HHS-HCC): Primary | ICD-10-CM

## 2024-01-16 DIAGNOSIS — O26.892 PELVIC PAIN AFFECTING PREGNANCY IN SECOND TRIMESTER, ANTEPARTUM (HHS-HCC): ICD-10-CM

## 2024-01-16 DIAGNOSIS — R10.2 PELVIC PAIN AFFECTING PREGNANCY IN SECOND TRIMESTER, ANTEPARTUM (HHS-HCC): ICD-10-CM

## 2024-01-16 DIAGNOSIS — O26.892 PREGNANCY HEADACHE IN SECOND TRIMESTER (HHS-HCC): ICD-10-CM

## 2024-01-16 PROBLEM — O36.8120 DECREASED FETAL MOVEMENTS IN SECOND TRIMESTER (HHS-HCC): Status: ACTIVE | Noted: 2024-01-16

## 2024-01-16 PROBLEM — O09.299 HISTORY OF PRE-ECLAMPSIA IN PRIOR PREGNANCY, CURRENTLY PREGNANT (HHS-HCC): Status: ACTIVE | Noted: 2024-01-16

## 2024-01-16 LAB
ALBUMIN SERPL BCP-MCNC: 3.5 G/DL (ref 3.4–5)
ALP SERPL-CCNC: 88 U/L (ref 33–110)
ALT SERPL W P-5'-P-CCNC: 3 U/L (ref 7–45)
ANION GAP SERPL CALC-SCNC: 13 MMOL/L (ref 10–20)
AST SERPL W P-5'-P-CCNC: 5 U/L (ref 9–39)
BILIRUB SERPL-MCNC: 0.2 MG/DL (ref 0–1.2)
BUN SERPL-MCNC: 6 MG/DL (ref 6–23)
CALCIUM SERPL-MCNC: 9.2 MG/DL (ref 8.6–10.6)
CHLORIDE SERPL-SCNC: 108 MMOL/L (ref 98–107)
CO2 SERPL-SCNC: 20 MMOL/L (ref 21–32)
CREAT SERPL-MCNC: 0.36 MG/DL (ref 0.5–1.05)
CREAT UR-MCNC: 66.2 MG/DL (ref 20–320)
EGFRCR SERPLBLD CKD-EPI 2021: >90 ML/MIN/1.73M*2
ERYTHROCYTE [DISTWIDTH] IN BLOOD BY AUTOMATED COUNT: 14.1 % (ref 11.5–14.5)
GLUCOSE SERPL-MCNC: 136 MG/DL (ref 74–99)
HCT VFR BLD AUTO: 37 % (ref 36–46)
HGB BLD-MCNC: 12.2 G/DL (ref 12–16)
HOLD SPECIMEN: NORMAL
LDH SERPL L TO P-CCNC: 113 U/L (ref 84–246)
MCH RBC QN AUTO: 27.7 PG (ref 26–34)
MCHC RBC AUTO-ENTMCNC: 33 G/DL (ref 32–36)
MCV RBC AUTO: 84 FL (ref 80–100)
NRBC BLD-RTO: 0 /100 WBCS (ref 0–0)
PLATELET # BLD AUTO: 248 X10*3/UL (ref 150–450)
POC APPEARANCE, URINE: CLEAR
POC BILIRUBIN, URINE: NEGATIVE
POC BLOOD, URINE: NEGATIVE
POC COLOR, URINE: YELLOW
POC GLUCOSE, URINE: ABNORMAL MG/DL
POC KETONES, URINE: NEGATIVE MG/DL
POC LEUKOCYTES, URINE: NEGATIVE
POC NITRITE,URINE: NEGATIVE
POC PH, URINE: 6.5 PH
POC PROTEIN, URINE: NEGATIVE MG/DL
POC SPECIFIC GRAVITY, URINE: 1.02
POC UROBILINOGEN, URINE: 0.2 EU/DL
POTASSIUM SERPL-SCNC: 3.9 MMOL/L (ref 3.5–5.3)
PROT SERPL-MCNC: 6.3 G/DL (ref 6.4–8.2)
PROT UR-ACNC: 7 MG/DL (ref 5–24)
PROT/CREAT UR: 0.11 MG/MG CREAT (ref 0–0.17)
RBC # BLD AUTO: 4.41 X10*6/UL (ref 4–5.2)
SODIUM SERPL-SCNC: 137 MMOL/L (ref 136–145)
URATE SERPL-MCNC: 3.3 MG/DL (ref 2.3–6.7)
WBC # BLD AUTO: 9.4 X10*3/UL (ref 4.4–11.3)

## 2024-01-16 PROCEDURE — 59025 FETAL NON-STRESS TEST: CPT

## 2024-01-16 PROCEDURE — 85027 COMPLETE CBC AUTOMATED: CPT

## 2024-01-16 PROCEDURE — 80053 COMPREHEN METABOLIC PANEL: CPT

## 2024-01-16 PROCEDURE — 87086 URINE CULTURE/COLONY COUNT: CPT

## 2024-01-16 PROCEDURE — 84550 ASSAY OF BLOOD/URIC ACID: CPT

## 2024-01-16 PROCEDURE — 99213 OFFICE O/P EST LOW 20 MIN: CPT

## 2024-01-16 PROCEDURE — 36415 COLL VENOUS BLD VENIPUNCTURE: CPT

## 2024-01-16 PROCEDURE — 83615 LACTATE (LD) (LDH) ENZYME: CPT

## 2024-01-16 PROCEDURE — 2500000001 HC RX 250 WO HCPCS SELF ADMINISTERED DRUGS (ALT 637 FOR MEDICARE OP)

## 2024-01-16 PROCEDURE — 99215 OFFICE O/P EST HI 40 MIN: CPT | Mod: 25

## 2024-01-16 PROCEDURE — 82570 ASSAY OF URINE CREATININE: CPT

## 2024-01-16 PROCEDURE — 81002 URINALYSIS NONAUTO W/O SCOPE: CPT

## 2024-01-16 RX ORDER — LABETALOL HYDROCHLORIDE 5 MG/ML
20 INJECTION, SOLUTION INTRAVENOUS ONCE AS NEEDED
Status: DISCONTINUED | OUTPATIENT
Start: 2024-01-16 | End: 2024-01-16 | Stop reason: HOSPADM

## 2024-01-16 RX ORDER — CYCLOBENZAPRINE HCL 10 MG
5 TABLET ORAL 3 TIMES DAILY PRN
Qty: 14 TABLET | Refills: 0 | Status: SHIPPED | OUTPATIENT
Start: 2024-01-16 | End: 2024-03-01 | Stop reason: HOSPADM

## 2024-01-16 RX ORDER — METOCLOPRAMIDE 10 MG/1
10 TABLET ORAL EVERY 8 HOURS PRN
Qty: 12 TABLET | Refills: 0 | Status: SHIPPED | OUTPATIENT
Start: 2024-01-16 | End: 2024-03-18 | Stop reason: HOSPADM

## 2024-01-16 RX ORDER — HYDRALAZINE HYDROCHLORIDE 20 MG/ML
5 INJECTION INTRAMUSCULAR; INTRAVENOUS ONCE AS NEEDED
Status: DISCONTINUED | OUTPATIENT
Start: 2024-01-16 | End: 2024-01-16 | Stop reason: HOSPADM

## 2024-01-16 RX ORDER — ACETAMINOPHEN 325 MG/1
975 TABLET ORAL ONCE
Status: COMPLETED | OUTPATIENT
Start: 2024-01-16 | End: 2024-01-16

## 2024-01-16 RX ORDER — NIFEDIPINE 10 MG/1
10 CAPSULE ORAL ONCE AS NEEDED
Status: DISCONTINUED | OUTPATIENT
Start: 2024-01-16 | End: 2024-01-16 | Stop reason: HOSPADM

## 2024-01-16 RX ORDER — METOCLOPRAMIDE 10 MG/1
10 TABLET ORAL ONCE
Status: COMPLETED | OUTPATIENT
Start: 2024-01-16 | End: 2024-01-16

## 2024-01-16 RX ORDER — METOCLOPRAMIDE HYDROCHLORIDE 5 MG/ML
10 INJECTION INTRAMUSCULAR; INTRAVENOUS ONCE
Status: COMPLETED | OUTPATIENT
Start: 2024-01-16 | End: 2024-01-16

## 2024-01-16 RX ORDER — LIDOCAINE HYDROCHLORIDE 10 MG/ML
0.5 INJECTION INFILTRATION; PERINEURAL ONCE AS NEEDED
Status: DISCONTINUED | OUTPATIENT
Start: 2024-01-16 | End: 2024-01-16 | Stop reason: HOSPADM

## 2024-01-16 RX ADMIN — ACETAMINOPHEN 975 MG: 325 TABLET ORAL at 16:29

## 2024-01-16 RX ADMIN — METOCLOPRAMIDE 10 MG: 10 TABLET ORAL at 16:29

## 2024-01-16 SDOH — ECONOMIC STABILITY: HOUSING INSECURITY: DO YOU FEEL UNSAFE GOING BACK TO THE PLACE WHERE YOU ARE LIVING?: NO

## 2024-01-16 SDOH — SOCIAL STABILITY: SOCIAL INSECURITY: DOES ANYONE TRY TO KEEP YOU FROM HAVING/CONTACTING OTHER FRIENDS OR DOING THINGS OUTSIDE YOUR HOME?: NO

## 2024-01-16 SDOH — HEALTH STABILITY: MENTAL HEALTH: WERE YOU ABLE TO COMPLETE ALL THE BEHAVIORAL HEALTH SCREENINGS?: YES

## 2024-01-16 SDOH — SOCIAL STABILITY: SOCIAL INSECURITY: DO YOU FEEL ANYONE HAS EXPLOITED OR TAKEN ADVANTAGE OF YOU FINANCIALLY OR OF YOUR PERSONAL PROPERTY?: NO

## 2024-01-16 SDOH — HEALTH STABILITY: MENTAL HEALTH: HAVE YOU USED ANY SUBSTANCES (CANABIS, COCAINE, HEROIN, HALLUCINOGENS, INHALANTS, ETC.) IN THE PAST 12 MONTHS?: NO

## 2024-01-16 SDOH — SOCIAL STABILITY: SOCIAL INSECURITY: HAVE YOU HAD THOUGHTS OF HARMING ANYONE ELSE?: NO

## 2024-01-16 SDOH — HEALTH STABILITY: MENTAL HEALTH: NON-SPECIFIC ACTIVE SUICIDAL THOUGHTS (PAST 1 MONTH): NO

## 2024-01-16 SDOH — SOCIAL STABILITY: SOCIAL INSECURITY: ARE YOU OR HAVE YOU BEEN THREATENED OR ABUSED PHYSICALLY, EMOTIONALLY, OR SEXUALLY BY ANYONE?: NO

## 2024-01-16 SDOH — SOCIAL STABILITY: SOCIAL INSECURITY: VERBAL ABUSE: DENIES

## 2024-01-16 SDOH — HEALTH STABILITY: MENTAL HEALTH: WISH TO BE DEAD (PAST 1 MONTH): NO

## 2024-01-16 SDOH — HEALTH STABILITY: MENTAL HEALTH: HAVE YOU USED ANY PRESCRIPTION DRUGS OTHER THAN PRESCRIBED IN THE PAST 12 MONTHS?: NO

## 2024-01-16 SDOH — SOCIAL STABILITY: SOCIAL INSECURITY: ARE THERE ANY APPARENT SIGNS OF INJURIES/BEHAVIORS THAT COULD BE RELATED TO ABUSE/NEGLECT?: NO

## 2024-01-16 SDOH — HEALTH STABILITY: MENTAL HEALTH: SUICIDAL BEHAVIOR (LIFETIME): NO

## 2024-01-16 SDOH — SOCIAL STABILITY: SOCIAL INSECURITY: PHYSICAL ABUSE: DENIES

## 2024-01-16 SDOH — SOCIAL STABILITY: SOCIAL INSECURITY: HAS ANYONE EVER THREATENED TO HURT YOUR FAMILY OR YOUR PETS?: NO

## 2024-01-16 SDOH — SOCIAL STABILITY: SOCIAL INSECURITY: ABUSE SCREEN: ADULT

## 2024-01-16 ASSESSMENT — PAIN SCALES - GENERAL
PAINLEVEL_OUTOF10: 0 - NO PAIN
PAINLEVEL_OUTOF10: 6
PAINLEVEL_OUTOF10: 1
PAINLEVEL_OUTOF10: 0 - NO PAIN

## 2024-01-16 ASSESSMENT — LIFESTYLE VARIABLES
HOW MANY STANDARD DRINKS CONTAINING ALCOHOL DO YOU HAVE ON A TYPICAL DAY: PATIENT DOES NOT DRINK
AUDIT-C TOTAL SCORE: 0
HOW OFTEN DO YOU HAVE 6 OR MORE DRINKS ON ONE OCCASION: NEVER
SKIP TO QUESTIONS 9-10: 1
HOW OFTEN DO YOU HAVE A DRINK CONTAINING ALCOHOL: NEVER
AUDIT-C TOTAL SCORE: 0

## 2024-01-16 ASSESSMENT — PATIENT HEALTH QUESTIONNAIRE - PHQ9
1. LITTLE INTEREST OR PLEASURE IN DOING THINGS: SEVERAL DAYS
SUM OF ALL RESPONSES TO PHQ9 QUESTIONS 1 & 2: 2
2. FEELING DOWN, DEPRESSED OR HOPELESS: SEVERAL DAYS

## 2024-01-16 NOTE — H&P
Obstetrical Admission History and Physical     Tranmayda Granger is a 23 y.o.  at 27.3 wga by 8.5 week ultrasound presenting to OB TRIAGE for decreased fetal movement, c/f uterine prolapse, and headache.    Chief Complaint: Other (Concerns of uterine prolapse) and Decreased Fetal Movement    Assessment/Plan      Headache in s/o cHTN  - Rated 6/10--> tylenol, reglan, flexeril, PO fluids--> resolved  - urine dip s/f SG 1.025  - HELLP labs negative, P:C 0.11  - BP cycled and WNL in OB triage, 1 low BP in s/o cuff elevated above heart while sleeping; on no meds  - rx for reglan sent to pharmacy  - reviewed BP parameters and BID measurement recommendation  - reviewed S&S of PEC, return precautions, patient verbalizes understanding    Decreased fetal movement  - NST appropriate for gestational age, now feeling normal movement in OB triage  - Good fetal movement  - Continue routine prenatal care  - Complete 1 hr GTT prior to next PNV  - Next appt     C/f uterine prolapse  -  h/o recent sacrospinous fixation and anterior/posterior repair for stage II uterine prolapse with Dr. Garcia  - Exam not c/f uterine prolapse at this time, possible cystocele, though difficult to assess in s/o gravid uterus  - Pelvic discomfort resolved with flexeril- rx sent to pharmacy for 1 week  - Urine culture pending  - Recommend pregnancy support band- clinic staff tasked to order  - Pelvic floor therapy referral placed    Maternal Well-being  - Vital signs stable and WNL  - All questions and concerns addressed   - Depression stable on no meds with routine outpatient therapy, denies SI/HI    Dispo  - patient appropriate for d/c home, agrees with plan  - f/u at next scheduled OB appt or to triage sooner as needed     Discussed plan and reviewed tracing with Dr. Uli Hardy, APRN-CNP     Active Problems:    Pelvic pain affecting pregnancy in second trimester, antepartum    Chronic hypertension affecting pregnancy    27  weeks gestation of pregnancy    Decreased fetal movements in second trimester    Pregnancy headache in second trimester    History of pre-eclampsia in prior pregnancy, currently pregnant      Pregnancy Problems (from 10/19/23 to present)       Problem Noted Resolved    Chronic hypertension affecting pregnancy 11/2/2023 by Dylon Song MD No    Priority:  Medium      Overview Signed 11/2/2023  3:25 PM by Dylon Song MD     BP mild-range today  Not on meds  Rx BP cuff  Pt given logs to track Bps outpatient         Abnormal quad screen 11/2/2023 by Dylon Song MD No    Priority:  Medium      Overview Signed 11/2/2023  3:27 PM by Dylon Song MD     Elevated T18 risk  Follow-up anatomy scan         Supervision of high risk pregnancy in second trimester 10/19/2023 by Zoila Wolf MD No    Priority:  Medium      Overview Addendum 11/2/2023 12:18 PM by Dylon Song MD     Dating:   [x] Initial BMI:   [x] Prenatal Labs: reviewed and wnl (11/2)  [x] Aneuploidy Screening: RR First Check  [x] Baby ASA: compliant  [x] Anatomy US: scheduled  [] 1hr GCT at 24-28wks:  [] Tdap (27-36wks):  [] Flu Shot:  [] COVID vaccine:   [] Rhogam (if Rh neg):   [] GBS at 36 wks:  [] Breastfeeding  [] PPBC:   [] 39 weeks discussion of IOL vs. Expectant management:  [] Mode of delivery:           Abdominal pain during pregnancy in second trimester 10/19/2023 by Zoila Wolf MD No    Priority:  Medium      Overview Signed 10/19/2023 12:53 PM by Zoila Wolf MD     - s/p ED visits x2, elevated WBC otherwise labs wnl  - CT unable to be completed to r/o appendicitis   - symptoms improved today                Subjective   Traniece is here complaining of decreased fetal movement, headache, and concern for uterine prolapse. No fetal movement since last night. Denies vaginal bleeding., Denies contractions., Denies leaking of fluid.      Patient reports no fetal movement today. Now in OB triage, normal fetal movement.  ARSHAD started this morning, rated 6/10. Patient has not taken any medication for her headache. She has been experiencing more headaches in pregnancy. They usually resolve on their own.  Patient feels like her uterus is prolapsing- nothing is protruding from the vagina, feeling pressure. Pelvic discomfort while supine, has to sleep with pillow between her legs. Difficulty falling asleep 2/2 pelvic discomfort. Denies symptoms of UTI or vaginal infection. Recently treated herself with OTC monistat for vaginal yeast infection- not diagnosed in clinic- now asymptomatic.     Obstetrical History   OB History    Para Term  AB Living   5 3 0 3 1 3   SAB IAB Ectopic Multiple Live Births           3      # Outcome Date GA Lbr Marcel/2nd Weight Sex Delivery Anes PTL Lv   5 Current            4 AB            3             2             1                 Past Medical History  Past Medical History:   Diagnosis Date    Degenerative myopia, bilateral 2020    Bilateral degenerative progressive high myopia    Degenerative myopia, bilateral 2020    Pathologic myopia, both eyes    Encounter for insertion of intrauterine contraceptive device 2019    Encounter for insertion of copper IUD    Encounter for pregnancy test, result negative 10/09/2020    Urine pregnancy test negative    Encounter for pregnancy test, result positive 2020    Pregnancy confirmed by positive urine test    Encounter for screening for infections with a predominantly sexual mode of transmission 2020    Routine screening for STI (sexually transmitted infection)    Encounter for screening for malignant neoplasm of cervix 2020    Cervical cancer screening    Encounter for surveillance of contraceptives, unspecified 2019    Encounter for surveillance of contraceptive device    Lattice degeneration of retina, 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    Myopia, unspecified eye  09/10/2015    High myopia    Other specified health status     No pertinent past medical history    Personal history of other infectious and parasitic diseases 12/05/2018    History of herpes labialis    Unspecified chorioretinal scars, 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     Medications  Medications Prior to Admission   Medication Sig Dispense Refill Last Dose    aspirin 81 mg chewable tablet CHEW 2 TABLETS BY MOUTH ONCE DAILY 60 tablet 3 1/16/2024    labetalol (Normodyne) 200 mg tablet Take 1 tablet (200 mg) by mouth 2 times a day.   More than a month    lidocaine (Lidoderm) 5 % patch Place 1 patch on the skin once daily.   More than a month    miscellaneous medical supply (Blood Pressure Cuff) misc USE TO TEST BLOOD PRESSURE (Patient not taking: Reported on 1/16/2024) 1 each 0 More than a month    -iron fum-folic acid (Prenatal 19) 29 mg iron- 1 mg tablet Take 1 tablet by mouth once daily.   More than a month    prenatal vitamin, iron-folic, 27 mg iron-800 mcg folic acid tablet TAKE 1 TABLET BY MOUTH ONCE DAILY 30 tablet 4 1/16/2024    pyridoxine (Vitamin B-6) 25 mg tablet Take 1 tablet (25 mg) by mouth 3 times a day.   More than a month       Objective    Last Vitals  Temp Pulse Resp BP MAP O2 Sat   36.5 °C (97.7 °F) 97 18 118/63   98 %     Physical Examination  FHR is 140 , with accelerations , and a category I  tracing.    [place] reading:  no ctx  VAGINA: normal appearing vagina with normal color and discharge and no lesions noted; SSE:  cervix in posterior vaginal vault; cervix not visualized with valsalva maneuver; anterior vaginal wall prolapse with valsalva- possible grade 1 vs 2 cystocele  CERVIX: Closed cm dilated, 20 % effaced, -3 station; MEMBRANES are  intact  General: Examination reveals a well developed, well nourished, female, in no acute distress. She is alert and cooperative.  Lungs: symmetrical, non-labored breathing.  Cardiac: warm, well-perfused.  Abdomen: soft, non-tender, gravid.  Extremities: no redness or tenderness in the calves or thighs.  Neurological: alert, oriented, normal speech, no focal findings or movement disorder noted.     Non-Stress Test   Baseline Fetal Heart Rate for Non-Stress Test: 140 BPM  Variability in Waveform for Non-Stress Test: Moderate  Accelerations in Non-Stress Test: greater than/equal to 10 bpm, lasting at least 10 seconds, Yes  Decelerations in Non-Stress Test: None  Contractions in Non-Stress Test: Not present  Acoustic Stimulator for Non-Stress Test: No  Interpretation of Non-Stress Test   Interpretation of Non-Stress Test: Reactive    Lab Review  Admission on 01/16/2024   Component Date Value Ref Range Status    Glucose 01/16/2024 136 (H)  74 - 99 mg/dL Final    Sodium 01/16/2024 137  136 - 145 mmol/L Final    Potassium 01/16/2024 3.9  3.5 - 5.3 mmol/L Final    Chloride 01/16/2024 108 (H)  98 - 107 mmol/L Final    Bicarbonate 01/16/2024 20 (L)  21 - 32 mmol/L Final    Anion Gap 01/16/2024 13  10 - 20 mmol/L Final    Urea Nitrogen 01/16/2024 6  6 - 23 mg/dL Final    Creatinine 01/16/2024 0.36 (L)  0.50 - 1.05 mg/dL Final    eGFR 01/16/2024 >90  >60 mL/min/1.73m*2 Final    Calculations of estimated GFR are performed using the 2021 CKD-EPI Study Refit equation without the race variable for the IDMS-Traceable creatinine methods.  https://jasn.asnjournals.org/content/early/2021/09/22/ASN.5538142736    Calcium 01/16/2024 9.2  8.6 - 10.6 mg/dL Final    Albumin 01/16/2024 3.5  3.4 - 5.0 g/dL Final     Alkaline Phosphatase 01/16/2024 88  33 - 110 U/L Final    Total Protein 01/16/2024 6.3 (L)  6.4 - 8.2 g/dL Final    AST 01/16/2024 5 (L)  9 - 39 U/L Final    Bilirubin, Total 01/16/2024 0.2  0.0 - 1.2 mg/dL Final    ALT 01/16/2024 3 (L)  7 - 45 U/L Final    Patients treated with Sulfasalazine may generate falsely decreased results for ALT.    WBC 01/16/2024 9.4  4.4 - 11.3 x10*3/uL Final    nRBC 01/16/2024 0.0  0.0 - 0.0 /100 WBCs Final    RBC 01/16/2024 4.41  4.00 - 5.20 x10*6/uL Final    Hemoglobin 01/16/2024 12.2  12.0 - 16.0 g/dL Final    Hematocrit 01/16/2024 37.0  36.0 - 46.0 % Final    MCV 01/16/2024 84  80 - 100 fL Final    MCH 01/16/2024 27.7  26.0 - 34.0 pg Final    MCHC 01/16/2024 33.0  32.0 - 36.0 g/dL Final    RDW 01/16/2024 14.1  11.5 - 14.5 % Final    Platelets 01/16/2024 248  150 - 450 x10*3/uL Final    Total Protein, Urine Random 01/16/2024 7  5 - 24 mg/dL Final    Creatinine, Urine Random 01/16/2024 66.2  20.0 - 320.0 mg/dL Final    T. Protein/Creatinine Ratio 01/16/2024 0.11  0.00 - 0.17 mg/mg Creat Final    LDH 01/16/2024 113  84 - 246 U/L Final    Uric Acid 01/16/2024 3.3  2.3 - 6.7 mg/dL Final    Venipuncture immediately after or during the administration of Metamizole may lead to falsely low results. Testing should be performed immediately  prior to Metamizole dosing.    POC Color, Urine 01/16/2024 Yellow  Straw, Yellow, Light-Yellow Final    POC Appearance, Urine 01/16/2024 Clear  Clear Final    POC Glucose, Urine 01/16/2024 TRACE (A)  NEGATIVE mg/dl Final    POC Bilirubin, Urine 01/16/2024 NEGATIVE  NEGATIVE Final    POC Ketones, Urine 01/16/2024 NEGATIVE  NEGATIVE mg/dl Final    POC Specific Gravity, Urine 01/16/2024 1.025  1.005 - 1.035 Final    POC Blood, Urine 01/16/2024 NEGATIVE  NEGATIVE Final    POC PH, Urine 01/16/2024 6.5  No Reference Range Established PH Final    POC Protein, Urine 01/16/2024 NEGATIVE  NEGATIVE, 30 (1+) mg/dl Final    POC Urobilinogen, Urine 01/16/2024 0.2  0.2,  1.0 EU/DL Final    Poc Nitrite, Urine 01/16/2024 NEGATIVE  NEGATIVE Final    POC Leukocytes, Urine 01/16/2024 NEGATIVE  NEGATIVE Final    Extra Tube 01/16/2024 Hold for add-ons.   Final    Auto resulted.

## 2024-01-17 LAB — BACTERIA UR CULT: NORMAL

## 2024-01-17 PROCEDURE — RXMED WILLOW AMBULATORY MEDICATION CHARGE

## 2024-01-18 ENCOUNTER — PHARMACY VISIT (OUTPATIENT)
Dept: PHARMACY | Facility: CLINIC | Age: 24
End: 2024-01-18
Payer: COMMERCIAL

## 2024-01-18 ENCOUNTER — LAB (OUTPATIENT)
Dept: LAB | Facility: LAB | Age: 24
End: 2024-01-18
Payer: MEDICAID

## 2024-01-18 DIAGNOSIS — Z3A.25 25 WEEKS GESTATION OF PREGNANCY (HHS-HCC): ICD-10-CM

## 2024-01-18 DIAGNOSIS — R73.09 ELEVATED RANDOM BLOOD GLUCOSE LEVEL: ICD-10-CM

## 2024-01-18 LAB
ERYTHROCYTE [DISTWIDTH] IN BLOOD BY AUTOMATED COUNT: 14.6 % (ref 11.5–14.5)
HCT VFR BLD AUTO: 36.7 % (ref 36–46)
HGB BLD-MCNC: 12.1 G/DL (ref 12–16)
MCH RBC QN AUTO: 28.1 PG (ref 26–34)
MCHC RBC AUTO-ENTMCNC: 33 G/DL (ref 32–36)
MCV RBC AUTO: 85 FL (ref 80–100)
NRBC BLD-RTO: 0 /100 WBCS (ref 0–0)
PLATELET # BLD AUTO: 254 X10*3/UL (ref 150–450)
RBC # BLD AUTO: 4.31 X10*6/UL (ref 4–5.2)
REFLEX ADDED, ANEMIA PANEL: NORMAL
TREPONEMA PALLIDUM IGG+IGM AB [PRESENCE] IN SERUM OR PLASMA BY IMMUNOASSAY: NONREACTIVE
WBC # BLD AUTO: 9.6 X10*3/UL (ref 4.4–11.3)

## 2024-01-18 PROCEDURE — 82947 ASSAY GLUCOSE BLOOD QUANT: CPT

## 2024-01-18 PROCEDURE — 36415 COLL VENOUS BLD VENIPUNCTURE: CPT

## 2024-01-18 PROCEDURE — 85027 COMPLETE CBC AUTOMATED: CPT

## 2024-01-18 PROCEDURE — 86780 TREPONEMA PALLIDUM: CPT

## 2024-01-19 DIAGNOSIS — O24.419 GESTATIONAL DIABETES MELLITUS (GDM) IN THIRD TRIMESTER, GESTATIONAL DIABETES METHOD OF CONTROL UNSPECIFIED (HHS-HCC): Primary | ICD-10-CM

## 2024-01-19 LAB — GLUCOSE 1H P 50 G GLC PO SERPL-MCNC: 244 MG/DL

## 2024-01-22 RX ORDER — BLOOD-GLUCOSE METER
EACH MISCELLANEOUS
Qty: 150 EACH | Refills: 3 | Status: SHIPPED | OUTPATIENT
Start: 2024-01-22 | End: 2024-03-18 | Stop reason: HOSPADM

## 2024-01-22 RX ORDER — DEXTROSE 4 G
TABLET,CHEWABLE ORAL
Qty: 1 EACH | Refills: 0 | Status: SHIPPED | OUTPATIENT
Start: 2024-01-22

## 2024-01-22 RX ORDER — LANCETS 33 GAUGE
EACH MISCELLANEOUS
Qty: 200 EACH | Refills: 3 | Status: SHIPPED | OUTPATIENT
Start: 2024-01-22

## 2024-01-25 PROCEDURE — RXMED WILLOW AMBULATORY MEDICATION CHARGE

## 2024-01-26 ENCOUNTER — PHARMACY VISIT (OUTPATIENT)
Dept: PHARMACY | Facility: CLINIC | Age: 24
End: 2024-01-26
Payer: MEDICAID

## 2024-01-26 PROBLEM — O24.419 GESTATIONAL DIABETES (HHS-HCC): Status: ACTIVE | Noted: 2024-01-26

## 2024-01-26 PROCEDURE — RXMED WILLOW AMBULATORY MEDICATION CHARGE

## 2024-01-30 ENCOUNTER — TELEPHONE (OUTPATIENT)
Dept: OBSTETRICS AND GYNECOLOGY | Facility: CLINIC | Age: 24
End: 2024-01-30
Payer: MEDICAID

## 2024-01-31 PROBLEM — O09.299 HISTORY OF POSTPARTUM HEMORRHAGE, CURRENTLY PREGNANT (HHS-HCC): Status: ACTIVE | Noted: 2024-01-31

## 2024-01-31 PROBLEM — O09.299 HISTORY OF SHOULDER DYSTOCIA IN PRIOR PREGNANCY, CURRENTLY PREGNANT (HHS-HCC): Status: ACTIVE | Noted: 2024-01-31

## 2024-02-01 ENCOUNTER — INITIAL PRENATAL (OUTPATIENT)
Dept: MATERNAL FETAL MEDICINE | Facility: CLINIC | Age: 24
End: 2024-02-01
Payer: MEDICAID

## 2024-02-01 ENCOUNTER — PHARMACY VISIT (OUTPATIENT)
Dept: PHARMACY | Facility: CLINIC | Age: 24
End: 2024-02-01
Payer: MEDICAID

## 2024-02-01 ENCOUNTER — DOCUMENTATION (OUTPATIENT)
Dept: MATERNAL FETAL MEDICINE | Facility: CLINIC | Age: 24
End: 2024-02-01
Payer: MEDICAID

## 2024-02-01 ENCOUNTER — HOSPITAL ENCOUNTER (OUTPATIENT)
Dept: RADIOLOGY | Facility: CLINIC | Age: 24
Discharge: HOME | End: 2024-02-01
Payer: MEDICAID

## 2024-02-01 VITALS — DIASTOLIC BLOOD PRESSURE: 74 MMHG | WEIGHT: 214.3 LBS | BODY MASS INDEX: 35.66 KG/M2 | SYSTOLIC BLOOD PRESSURE: 119 MMHG

## 2024-02-01 DIAGNOSIS — U07.1 COVID: Primary | ICD-10-CM

## 2024-02-01 DIAGNOSIS — O24.419 GESTATIONAL DIABETES MELLITUS (GDM) IN THIRD TRIMESTER, GESTATIONAL DIABETES METHOD OF CONTROL UNSPECIFIED (HHS-HCC): ICD-10-CM

## 2024-02-01 PROBLEM — Z86.32 HISTORY OF GESTATIONAL DIABETES: Status: RESOLVED | Noted: 2023-10-18 | Resolved: 2024-02-01

## 2024-02-01 PROBLEM — Z3A.30 30 WEEKS GESTATION OF PREGNANCY (HHS-HCC): Status: ACTIVE | Noted: 2024-01-16

## 2024-02-01 LAB — GLUCOSE BLD MANUAL STRIP-MCNC: 183 MG/DL (ref 74–99)

## 2024-02-01 PROCEDURE — 76816 OB US FOLLOW-UP PER FETUS: CPT

## 2024-02-01 PROCEDURE — 90480 ADMN SARSCOV2 VAC 1/ONLY CMP: CPT | Performed by: OBSTETRICS & GYNECOLOGY

## 2024-02-01 PROCEDURE — 99214 OFFICE O/P EST MOD 30 MIN: CPT | Performed by: OBSTETRICS & GYNECOLOGY

## 2024-02-01 PROCEDURE — 76819 FETAL BIOPHYS PROFIL W/O NST: CPT

## 2024-02-01 PROCEDURE — 76819 FETAL BIOPHYS PROFIL W/O NST: CPT | Performed by: MEDICAL GENETICS

## 2024-02-01 PROCEDURE — 82947 ASSAY GLUCOSE BLOOD QUANT: CPT | Performed by: OBSTETRICS & GYNECOLOGY

## 2024-02-01 PROCEDURE — RXMED WILLOW AMBULATORY MEDICATION CHARGE

## 2024-02-01 PROCEDURE — 76816 OB US FOLLOW-UP PER FETUS: CPT | Performed by: MEDICAL GENETICS

## 2024-02-01 RX ORDER — IBUPROFEN 200 MG
CAPSULE ORAL
Qty: 150 EACH | Refills: 3 | Status: SHIPPED | OUTPATIENT
Start: 2024-02-01 | End: 2024-03-18 | Stop reason: HOSPADM

## 2024-02-01 RX ORDER — ISOPROPYL ALCOHOL 70 ML/100ML
SWAB TOPICAL
Qty: 200 EACH | Refills: 3 | Status: SHIPPED | OUTPATIENT
Start: 2024-02-01 | End: 2024-02-08 | Stop reason: SDUPTHER

## 2024-02-01 RX ORDER — LANCETS 33 GAUGE
EACH MISCELLANEOUS
Qty: 200 EACH | Refills: 3 | Status: SHIPPED | OUTPATIENT
Start: 2024-02-01

## 2024-02-01 RX ORDER — INSULIN HUMAN 100 [IU]/ML
INJECTION, SUSPENSION SUBCUTANEOUS
Qty: 15 ML | Refills: 3 | Status: SHIPPED | OUTPATIENT
Start: 2024-02-01 | End: 2024-02-05 | Stop reason: SDUPTHER

## 2024-02-01 RX ORDER — DEXTROSE 4 G
TABLET,CHEWABLE ORAL
Qty: 1 EACH | Refills: 0 | Status: SHIPPED | OUTPATIENT
Start: 2024-02-01

## 2024-02-01 ASSESSMENT — EDINBURGH POSTNATAL DEPRESSION SCALE (EPDS)
I HAVE FELT SCARED OR PANICKY FOR NO GOOD REASON: NO, NOT AT ALL
TOTAL SCORE: 0
I HAVE LOOKED FORWARD WITH ENJOYMENT TO THINGS: AS MUCH AS I EVER DID
I HAVE BEEN ABLE TO LAUGH AND SEE THE FUNNY SIDE OF THINGS: AS MUCH AS I ALWAYS COULD
I HAVE FELT SAD OR MISERABLE: NO, NOT AT ALL
I HAVE BEEN SO UNHAPPY THAT I HAVE HAD DIFFICULTY SLEEPING: NOT AT ALL
THINGS HAVE BEEN GETTING ON TOP OF ME: NO, I HAVE BEEN COPING AS WELL AS EVER
I HAVE BEEN ANXIOUS OR WORRIED FOR NO GOOD REASON: NO, NOT AT ALL
THE THOUGHT OF HARMING MYSELF HAS OCCURRED TO ME: NEVER
I HAVE BEEN SO UNHAPPY THAT I HAVE BEEN CRYING: NO, NEVER
I HAVE BLAMED MYSELF UNNECESSARILY WHEN THINGS WENT WRONG: NO, NEVER

## 2024-02-01 ASSESSMENT — ENCOUNTER SYMPTOMS
ENDOCRINE NEGATIVE: 0
CARDIOVASCULAR NEGATIVE: 0
RESPIRATORY NEGATIVE: 0
ALLERGIC/IMMUNOLOGIC NEGATIVE: 0
HEMATOLOGIC/LYMPHATIC NEGATIVE: 0
PSYCHIATRIC NEGATIVE: 0
CONSTITUTIONAL NEGATIVE: 0
EYES NEGATIVE: 0
NEUROLOGICAL NEGATIVE: 0
MUSCULOSKELETAL NEGATIVE: 0
GASTROINTESTINAL NEGATIVE: 0

## 2024-02-01 NOTE — PROGRESS NOTES
2024   Scooter Granger     MFM CONSULT NOTE  Referring Clinician: Nirmala Gómez  Reason for consult: GDM     HPI: Scooter Granger is a 23 y.o.  at 30w0d here for consult for newly diagnosed GDM by 1-hour of 244 .     Patient reports feeling well, pelvic pain she has been having this pregnancy is stable. Declines pregnancy support belt.  Denies contractions, bleeding, or LOF. Reports normal fetal movement    - cHTN, no meds   - usually  normotensive 146/83   - A1DM, 1-hr 244   - Depression, no meds, previously on Seroquel outside of pregnancy   - Hx sPEC in previous pregnancy (35-36w IOL for sPEC)  - Class I obesity   - Chronic headaches     Pregnancy Problems (from 10/19/23 to present)       Problem Noted Resolved    History of postpartum hemorrhage, currently pregnant 2024 by Renetta Telles MD No    Priority:  Medium      History of shoulder dystocia in prior pregnancy, currently pregnant 2024 by Renetta Telles MD No    Priority:  Medium      Overview Addendum 2024  1:55 PM by Renetta Telles MD     , 36.0wks, sPEC, A1DM   No residual deficits in that child, no shoulder in x2 35w deliveries (P1 and P3)         Gestational diabetes 2024 by Renetta Baldwin, RN No    Priority:  Medium      Overview Addendum 2024  1:53 PM by Renetta Telles MD     By 1-hour 244    [ ] Attended [employer] Camp > Maegan met with patient     [x] MFM Consult completed  [ ] Serial growth ultrasounds     Last ultrasound: , read pending     Fetal surveillance:  [] Weekly at 32 weeks  [] Twice weekly at 36 weeks    Current Regimen: diet          30 weeks gestation of pregnancy 2024 by ZELDA Avery No    Priority:  Medium      Decreased fetal movements in second trimester 2024 by ZELDA Avery No    Priority:  Medium      Pregnancy headache in second trimester 2024 by ZELDA Avery No    Priority:  Medium      History of pre-eclampsia in prior  pregnancy, currently pregnant 1/16/2024 by Brisa Hardy, APRN-CNP No    Priority:  Medium      Overview Addendum 2/1/2024  1:55 PM by Renetta Telles MD     On Asa ppx         Chronic hypertension affecting pregnancy 11/2/2023 by Dylon Song MD No    Priority:  Medium      Overview Addendum 2/1/2024  1:46 PM by Renetta Telles MD     Not on meds, asa ppx  Rx BP cuff  Home Bps mostly normotensive, some mild range   Baseline PEC labs wnl, 1/16 P:C 0.11           Abnormal quad screen 11/2/2023 by Dylon Song MD No    Priority:  Medium      Overview Addendum 2/1/2024 10:27 AM by Renetta Telles MD     NORMAL when corrected for ZABRINA            Supervision of high risk pregnancy in second trimester 10/19/2023 by Zoila Wolf MD No    Priority:  Medium      Overview Addendum 2/1/2024  1:49 PM by Renetta Telles MD     Dating:   [x] Initial BMI: 35  [x] Prenatal Labs: reviewed and wnl (11/2)  [x] Aneuploidy Screening: RR First Check- normal quad screen (after corrected for GA)  [x] Baby ASA: compliant  [x] Anatomy US: wnl   [x] 1hr GCT at 24-28wks: 1-hr 244, GDM   [] Tdap (27-36wks):  [x] Flu Shot: 9/2023  [x] COVID vaccine:  booster 2/1/24  [x] Rhogam (if Rh neg): Rh+ not indicated   [] GBS at 36 wks:  [x] Breastfeeding: yes, wants to buy hands free pump, declined sending rx  [x] PPBC: s/p BS (was pregnant right before tubal, had negative UPT prior)  [] 39 weeks discussion of IOL vs. Expectant management:  [] Mode of delivery:  anticipate vaginal, had prior PPH with first child and Shoulder dystocia with P2, no residual deficits for that child         Pelvic pain affecting pregnancy in second trimester, antepartum 10/19/2023 by Zoila Wolf MD No    Priority:  Medium      Overview Signed 10/19/2023 12:53 PM by Zoila Wolf MD     - s/p ED visits x2, elevated WBC otherwise labs wnl  - CT unable to be completed to r/o appendicitis   - symptoms improved today                   10 point review of system is  negative except as above    OB History          5    Para   3    Term   0       3    AB   1    Living   3         SAB        IAB        Ectopic        Multiple        Live Births   3                   Past medical history: Denies asthma or thyroid issues    Past Surgical History:   Procedure Laterality Date    OTHER SURGICAL HISTORY  2017    Surg Results Vision Left Eye Central As Expected         No Known Allergies    Social History     Tobacco Use    Smoking status: Never     Passive exposure: Never    Smokeless tobacco: Never   Vaping Use    Vaping Use: Never used   Substance Use Topics    Alcohol use: Not Currently    Drug use: Never       family history includes Diabetes in her mother.      OBJECTIVE  Visit Vitals  /74   Wt 97.2 kg (214 lb 4.8 oz)   BMI 35.66 kg/m²   OB Status Pregnant   Smoking Status Never   BSA 2.11 m²       Physical exam    FHT: Growth US     ASSESSMENT & PLAN    Scooter Granger is a 23 y.o.  at 30w0d here for the following:    Gestational diabetes  Patient was recently diagnosed with gestational diabetes (GDM).  We discussed the pregnancy implications of the diagnosis including increased risk of pre-eclampsia, LGA/macrosomia, shoulder dystocia, stillbirth as well as  hypoglycemia, hyperbilirubinemia and respiratory distress.  We reviewed the importance of glycemic control and its impact on lowering these risks.  Discussed management ranging from dietary changes to pharmacotherapy and that insulin is considered first line therapy rather than oral agents if medication is ultimately needed.  Discussed our recommendation for serial growth ultrasounds and starting  testing at 32 weeks if treatment is required.  We also stressed the potential persistence of impaired glucose tolerance post pregnancy in up to 30% of patients and 50% risk of IGT/T2DM in the next 10 years with an overall lifetime risk of T2DM of 70%. We reviewed the recommendation  for postpartum screening at 6 weeks post-partum (can be performed as soon as 2 days after delivery) and, if normal, routine screening every 1-3 years. We did discuss that a healthy diet and maintenance of a normal body weight may reduce those risks    In summary the following is recommended:    1. We will continue to co-manage diabetes via the Bloodsugar line with weekly assessment of glycemic control.  Current regimen is:  2. We will plan for a follow up MD visit at 36 weeks to assess overall control and provide delivery timing recommendations.  3. Recommend serial growth ultrasounds every 4 weeks starting at 28 weeks gestation.  4. Weekly  testing is recommended starting at 32 weeks IF medication is required OR there is a LGA growth pattern.  Twice weekly testing is recommended at 32 weeks if control is suboptimal, there is polyhydramnios, or medication is required AND there is a LGA growth pattern.  5. Delivery is recommended at 39 weeks, though if glycemic control is suboptimal then delivery at 37-39 weeks may be considered.  6. If the EFW is >4500g at the time of delivery  should be considered.  7. A 2hr GTT is recommended 6 weeks postpartum (can be performed as soon as 2 days postpartum), if normal then screening for T2DM is recommended at least every 3 years.    Plan to RTC in 4-7 days to review BG and likely start insulin     Chronic hypertension affecting pregnancy  She has been counseled on the implications of cHTN during pregnancy including the increased risk of  delivery, placental abruption, pre-eclampsia/HELLP, FGR,  NICU admission and stillbirth.  We discussed that antihypertensive medications cross the placenta but are generally considered safe with first line medications being labetalol and nifedipine due to their efficacy and safety data.  Generally, blood pressure targets are <140 systolic and <90 diastolic.   We discussed that due to the physiologic decrease in  systemic vascular resistance during pregnancy, blood pressure often is lower in the first and second trimesters before returning to their baseline values in the third trimester and that blood pressure medications often need to be titrated as gestation advanced.  However careful attention should be paid to the potential development of pre-eclampsia if increased doses are required.  We discussed that we recommend obtaining baseline pre-eclampsia labs as well as serial growth ultrasounds to screen for FGR.  If oral antihypertensive is required we recommend  testing with twice weekly NSTs to start at 32 weeks.  We recommend delivery at 38-39 weeks for patients with cHTN that is well controlled without medication, 37-38 in patients well controlled on medication and delivery at 37 weeks in patients that are poorly controlled.    Major depression  We reviewed the implications of treatment for depression in pregnancy and that generally the benefits of treated depression outweigh potential fetal risks associated with medication use.  We discussed that untreated depression in pregnancy carries with it risks of poor compliance with prenatal care, poor nutrition, low birth weight,  fetal growth, and disruptions in mother-infant bonding.  We did review that antidepressant medication is considered safe in pregnancy with the majority of data pertaining to SSRIs. While there is some potential for  withdrawal this is generally mild.  Given its safety profile, Zoloft is often considered as a first line medication though SSRIs may be used (paroxetine should be avoided due to its association with cardiac defects).    History of pre-eclampsia in prior pregnancy, currently pregnant  I have discussed with Ms. Granger some of the risk factors for pre-eclampsia. We discussed that the recurrence rate of pre-eclampsia in a subsequent pregnancy is ~15-20%, though the rate can be higher for those with severe disease  and/or presented at earlier gestational ages.  Women with obesity, HTN or diabetes have an increased risk of pre-eclampsia and if these co-morbidities are present should be optimized prior to conception with weight loss, pharmacotherapy or both.  We discussed that the use of low dose aspirin starting at 12 weeks gestation has been demonstrated to reduce the risk of recurrence in women at high risk.   Otherwise there is insufficient data to recommend other prevention techniques and any subsequent pregnancy should be managed with close observation and baseline pre-eclampsia laboratory evaluation.  We did discuss the association of preeclampsia in a prior pregnancy with the development of fetal growth restriction n a subsequent pregnancy and that I would recommend assessment of fetal growth by ultrasound in the third trimester.  We also discussed that having a history of  delivery <34 weeks with a morphologically normal fetus due to pre-eclampsia or eclampsia is one of the clinical criteria for screening for the Antiphospholipid Antibody Syndrome .  However given that there is insufficient evidence to suggest that screening and/or treatment has an effect on outcome I would not recommend screening unless other clinical factors were present.  Finally, we also discussed the increased lifelong risk in cardiovascular disease including CAD, stroke, heart failure in women with a history of pre-eclampsia and the recommendation for regular health screening as well as maintaining healthy weight, adopting a diet high in fiber, vegetables and fruit and low in fat, engaging in regular aerobic exercise, and avoiding tobacco.    Supervision of high risk pregnancy in second trimester  Covid vaccination booster today      Thank you for allowing us to participate in the care of your patient. Given her medical complexity we will transfer her care to our service    Seen and dw Dr Medhat Telles MD   Maternal Fetal  Medicine

## 2024-02-01 NOTE — ASSESSMENT & PLAN NOTE
We reviewed the implications of treatment for depression in pregnancy and that generally the benefits of treated depression outweigh potential fetal risks associated with medication use.  We discussed that untreated depression in pregnancy carries with it risks of poor compliance with prenatal care, poor nutrition, low birth weight,  fetal growth, and disruptions in mother-infant bonding.  We did review that antidepressant medication is considered safe in pregnancy with the majority of data pertaining to SSRIs. While there is some potential for  withdrawal this is generally mild.  Given its safety profile, Zoloft is often considered as a first line medication though SSRIs may be used (paroxetine should be avoided due to its association with cardiac defects).

## 2024-02-01 NOTE — ASSESSMENT & PLAN NOTE
I have discussed with Ms. Elkin some of the risk factors for pre-eclampsia. We discussed that the recurrence rate of pre-eclampsia in a subsequent pregnancy is ~15-20%, though the rate can be higher for those with severe disease and/or presented at earlier gestational ages.  Women with obesity, HTN or diabetes have an increased risk of pre-eclampsia and if these co-morbidities are present should be optimized prior to conception with weight loss, pharmacotherapy or both.  We discussed that the use of low dose aspirin starting at 12 weeks gestation has been demonstrated to reduce the risk of recurrence in women at high risk.   Otherwise there is insufficient data to recommend other prevention techniques and any subsequent pregnancy should be managed with close observation and baseline pre-eclampsia laboratory evaluation.  We did discuss the association of preeclampsia in a prior pregnancy with the development of fetal growth restriction n a subsequent pregnancy and that I would recommend assessment of fetal growth by ultrasound in the third trimester.  We also discussed that having a history of  delivery <34 weeks with a morphologically normal fetus due to pre-eclampsia or eclampsia is one of the clinical criteria for screening for the Antiphospholipid Antibody Syndrome .  However given that there is insufficient evidence to suggest that screening and/or treatment has an effect on outcome I would not recommend screening unless other clinical factors were present.  Finally, we also discussed the increased lifelong risk in cardiovascular disease including CAD, stroke, heart failure in women with a history of pre-eclampsia and the recommendation for regular health screening as well as maintaining healthy weight, adopting a diet high in fiber, vegetables and fruit and low in fat, engaging in regular aerobic exercise, and avoiding tobacco.

## 2024-02-01 NOTE — PROGRESS NOTES
Scooter Granger here for MFM consult  Previous GDM x 2  1 hr screen >200, elevated random BG in clinic  To pickup Fingerstick blood glucose monitoring supplies today.  States knows how to use.     Insulin education today in anticipation of need.  Will also place orders today in anticipation of PA process so that med can be initiated early next week if needed.  Paltient & male visitor both state understanding    Insulin Pen Education     Able to demonstrate/describe:     Wash hands      Check label - verify correct medication   Gentle mix (NPH)   Attach new needle each time   Safety test (prime pen, ensure needle working properly)    Select correct dose   Self injection - done R abd no med adm   Site selection & rotation   Remove needle   Insulin Storage   Needle disposal    Review Hypoglycemia    Causes   Sign & symptoms   Oral Treatment    Patient appears to have good understanding and is engaged in self-care.  Encouraged to call for questions or concerns    Given printed education materials  Plans f/u by phone 4 days and then weekly for BGM support.    Has log sheets and/or contact information.

## 2024-02-01 NOTE — ASSESSMENT & PLAN NOTE
She has been counseled on the implications of cHTN during pregnancy including the increased risk of  delivery, placental abruption, pre-eclampsia/HELLP, FGR,  NICU admission and stillbirth.  We discussed that antihypertensive medications cross the placenta but are generally considered safe with first line medications being labetalol and nifedipine due to their efficacy and safety data.  Generally, blood pressure targets are <140 systolic and <90 diastolic.   We discussed that due to the physiologic decrease in systemic vascular resistance during pregnancy, blood pressure often is lower in the first and second trimesters before returning to their baseline values in the third trimester and that blood pressure medications often need to be titrated as gestation advanced.  However careful attention should be paid to the potential development of pre-eclampsia if increased doses are required.  We discussed that we recommend obtaining baseline pre-eclampsia labs as well as serial growth ultrasounds to screen for FGR.  If oral antihypertensive is required we recommend  testing with twice weekly NSTs to start at 32 weeks.  We recommend delivery at 38-39 weeks for patients with cHTN that is well controlled without medication, 37-38 in patients well controlled on medication and delivery at 37 weeks in patients that are poorly controlled.

## 2024-02-01 NOTE — ASSESSMENT & PLAN NOTE
Patient was recently diagnosed with gestational diabetes (GDM).  We discussed the pregnancy implications of the diagnosis including increased risk of pre-eclampsia, LGA/macrosomia, shoulder dystocia, stillbirth as well as  hypoglycemia, hyperbilirubinemia and respiratory distress.  We reviewed the importance of glycemic control and its impact on lowering these risks.  Discussed management ranging from dietary changes to pharmacotherapy and that insulin is considered first line therapy rather than oral agents if medication is ultimately needed.  Discussed our recommendation for serial growth ultrasounds and starting  testing at 32 weeks if treatment is required.  We also stressed the potential persistence of impaired glucose tolerance post pregnancy in up to 30% of patients and 50% risk of IGT/T2DM in the next 10 years with an overall lifetime risk of T2DM of 70%. We reviewed the recommendation for postpartum screening at 6 weeks post-partum (can be performed as soon as 2 days after delivery) and, if normal, routine screening every 1-3 years. We did discuss that a healthy diet and maintenance of a normal body weight may reduce those risks    In summary the following is recommended:    1. We will continue to co-manage diabetes via the Bloodsugar line with weekly assessment of glycemic control.  Current regimen is:  2. We will plan for a follow up MD visit at 36 weeks to assess overall control and provide delivery timing recommendations.  3. Recommend serial growth ultrasounds every 4 weeks starting at 28 weeks gestation.  4. Weekly  testing is recommended starting at 32 weeks IF medication is required OR there is a LGA growth pattern.  Twice weekly testing is recommended at 32 weeks if control is suboptimal, there is polyhydramnios, or medication is required AND there is a LGA growth pattern.  5. Delivery is recommended at 39 weeks, though if glycemic control is suboptimal then delivery at  37-39 weeks may be considered.  6. If the EFW is >4500g at the time of delivery  should be considered.  7. A 2hr GTT is recommended 6 weeks postpartum (can be performed as soon as 2 days postpartum), if normal then screening for T2DM is recommended at least every 3 years.    Plan to RTC in 4-7 days to review BG and likely start insulin

## 2024-02-02 ENCOUNTER — APPOINTMENT (OUTPATIENT)
Dept: RADIOLOGY | Facility: CLINIC | Age: 24
End: 2024-02-02
Payer: MEDICAID

## 2024-02-05 ENCOUNTER — TELEPHONE (OUTPATIENT)
Dept: MATERNAL FETAL MEDICINE | Facility: HOSPITAL | Age: 24
End: 2024-02-05
Payer: MEDICAID

## 2024-02-05 ENCOUNTER — NUTRITION (OUTPATIENT)
Dept: OBSTETRICS AND GYNECOLOGY | Facility: CLINIC | Age: 24
End: 2024-02-05
Payer: MEDICAID

## 2024-02-05 DIAGNOSIS — O24.419 GESTATIONAL DIABETES MELLITUS (GDM) IN THIRD TRIMESTER, GESTATIONAL DIABETES METHOD OF CONTROL UNSPECIFIED (HHS-HCC): ICD-10-CM

## 2024-02-05 RX ORDER — INSULIN HUMAN 100 [IU]/ML
INJECTION, SUSPENSION SUBCUTANEOUS
Qty: 15 ML | Refills: 3 | Status: SHIPPED | OUTPATIENT
Start: 2024-02-05 | End: 2024-02-08 | Stop reason: ALTCHOICE

## 2024-02-05 NOTE — PROGRESS NOTES
Nutrition Assessment     Reason for Visit:  Scooter Granger is a 23 y.o. female who presents for GDM telephone consult.    Anthropometrics:          Food And Nutrient Intake:  Food and Nutrient History  Food and Nutrient History: Per pt: being mindful of carb portions. (States that Mom discussed diabetes and diet w/her.) Drinking only water.  w/small chicken wrap and handful of fries.  after dinner yesterday but can't recall food choices. States that remaining pc BGs have been WNL. FBGs ~120s. Pt submitted log to [facility] Line this morning.               OB Nutrition Intake  Weeks of Gestation: 30-4                                              Food And Nutrient Administration:                        Factors:                         Physical Activities:              Knowledge Beliefs Attitudes and Behavior                                       Nutrition Focused Physical Exam:           Energy Needs           Nutrition Diagnosis      Nutrition Diagnosis  Patient has Nutrition Diagnosis: Yes  Nutrition Diagnosis 1: Food and nutrition related knowledge deficit  Related to (1): lack of previous nutrition counseling for blood sugar control in pregnancy,  As Evidenced by (1): pt report of no previous RDN nutrition counseling.    Nutrition Interventions/Recommendations   Food and Nutrition Delivery  Meals & Snacks: Carbohydrate-modified diet  Goals: Recommended Daily carbohydrate distribution:    Breakfast: 1-2 carbohydrate servings (15-30g CHO) --  avoid concentrated sweets/high GI foods: fruit, fruit juices, cold cereal and milk, syrup, jams and jellies, etc.  AM Snack:  1-2 servings   Lunch:  3-4 servings (45-60g CHO)  Midday Snack:  1-2 servings   Dinner: 3-4 servings   HS Snack:  2 servings (30g)    -Be sure to add protein food to carb choices at each meal and snack: cheese, meat, eggs, nuts, peanut butter, etc    -During the day, eat about every 2 - 4 hours.     -Avoid more than 8-10 hours overnight  without eating. If more than about 2hrs between dinner and bedtime, have a carb plus protein snack.     -Higher fiber carbohydrates/whole grains are preferred over refined carb choices.    -- Avoid juices and sugar-sweetened beverages. Diet drinks are fine. Try eliminating milk at mealtime if noticing after-meal BG elevations. Unsweetened almond or soymilk beverages are a lower-carb dairy alternative.    -Recommended Dietary Allowance for carbohydrates during pregnancy is a minimum of 175g/day (11-12 servings) to provide 33g/day for fetal brain development. Many women do well eating a bit less than 175g CHO/day--this is fine, as long as there is no purposeful, severe restriction of carb foods at meals and snacks (ex: Keto or Atkins-style diets for BG control) and no symptoms indicating inadequate carb intake-- loss of weight, feeling dizzy/lightheaded, irritable, confused, excessively hungry, etc.  Other:: Pt encouraged to increase intake of protein and non-starchy vegs at her meals, as she states that portion control is difficult at times. (Discussed starchy vs non-starchy vegs.)  Goals: Intake per GDM Diet recommendations as tolerated.    (Brief call today--[facility] Line called during consult.)    Nutrition Monitoring and Evaluation   Biochemical Data, Medical Tests and Procedures  Monitoring and Evaluation Plan: Glucose/endocrine profile  Glucose/Endocrine Profile: Glucose, casual, Glucose, fasting  Criteria: BG within target ranges for diabetes in pregnancy. BGs submitted by pt to [facility] Line weekly.    Follow-up as needed to assess goal attainment. Modifications based on further assessment and self-blood glucose monitoring results.    Traniece expresses understanding and agreement.

## 2024-02-05 NOTE — TELEPHONE ENCOUNTER
"Blood Sugar Support Line Communication   Communicated with the patient on 1/9/2024   She has Gestational Diabetes 30w4d    At the time of the call her diabetes was treated with:  Nurtrtion plan alone - just spoke with Registered Dietician for nutrition education today    The patient checks her sugars and reports them as follows:      Scooter Granger seen in clinic last week.  Insulin Education provided in anticipation of need this week.  She began Fingerstick blood glucose monitoring late last week (see above).   Aware of need to begin insulin.  FBS consisently elevated.  After meal BGM  without nutrition education     FBS today 180? Per her memory    The patient's regimen was changed to:   Begin NPH inulin before breakfast and before bed  10*/16*  Has MFM appt later this week  Updated prescription sent to pharmacy on record  with NPH inulin dose.  Agrees to contact the pharmacy in about an hour to see if available for  before the end of the day.    States not need review of insulin injection.  \"I got it an I have the sheets\"    Patient understands to submit sugar log for review weekly through the Blood Sugar Line @ [phone] or via email to [email or web address] to help optimize glucose control.    I spent approximately 8-10 minutes on the phone with the patient        "

## 2024-02-06 PROBLEM — O16.3 HYPERTENSION AFFECTING PREGNANCY IN THIRD TRIMESTER (HHS-HCC): Status: ACTIVE | Noted: 2023-11-02

## 2024-02-06 PROBLEM — O24.414 INSULIN CONTROLLED GESTATIONAL DIABETES MELLITUS (GDM) IN THIRD TRIMESTER (HHS-HCC): Status: ACTIVE | Noted: 2024-01-26

## 2024-02-07 NOTE — ASSESSMENT & PLAN NOTE
- 2/ Growth US: EFW 1635g 27%, AC 11%. AFV normal  - Current regimen: NPH 10/16 however patient was not able to pick it up due to insurance coverage. Patient tried to call in however still not filled.   POCT  today: 139  Blood sugars reviewed:  Fastins (7/7 abnormal)  Breakfast: 150s (4/7 abnormal)  Lunch:  160s (6/7 abnormal)  Dinner: 170s (4/6 abnormal)  Regimen changed to Lantus 10/16 for insurance issues

## 2024-02-07 NOTE — ASSESSMENT & PLAN NOTE
2021, 36.0wks, sPEC, A1DM   No residual deficits in that child, no shoulder in x2 35w deliveries (P1 and P3)  - ###

## 2024-02-07 NOTE — ASSESSMENT & PLAN NOTE
- No meds  - BP today 125, 74; Asx  - BP log reviewed, has avgd 130s with occasional 140s  - Daniele

## 2024-02-07 NOTE — PROGRESS NOTES
MFM Follow-up  24        SUBJECTIVE    HPI: Scooter Granger is a 23 y.o.  at 31w0d here for RPNV. Denies bleeding or LOF. Reports normal fetal movement. Patient reports contractions last night 3-4x per hour last night. Reports HA that has been ongoing since early pregnancy.    OBJECTIVE  Visit Vitals  /74   Pulse (!) 118   Wt 96.6 kg (213 lb)   BMI 35.45 kg/m²   OB Status Pregnant   Smoking Status Never   BSA 2.1 m²      FHT: 130    ASSESSMENT & PLAN    Scooter Granger is a 23 y.o.  at 31w0d here for the following concerns we addressed today:    31 weeks gestation of pregnancy  TDAP today    Insulin controlled gestational diabetes mellitus (GDM) in third trimester  -  Growth US: EFW 1635g 27%, AC 11%. AFV normal  - Current regimen: NPH 10/16 however patient was not able to pick it up due to insurance coverage. Patient tried to call in however still not filled.   POCT  today: 139  Blood sugars reviewed:  Fastins (7/7 abnormal)  Breakfast: 150s (4/7 abnormal)  Lunch:  160s (6/7 abnormal)  Dinner: 170s (4/6 abnormal)  Regimen changed to Lantus 10/16 for insurance issues     Hypertension affecting pregnancy in third trimester  - No meds  - BP today 125, 74; Asx  - BP log reviewed, has avgd 130s with occasional 140s  - bASA    Pelvic pain affecting pregnancy in second trimester, antepartum  - Reporting continued pelvic pressure.  - Has tried flexeril, tylenol, belly band without help     Orders Placed This Encounter   Procedures    POCT GLUCOSE     Order Specific Question:   Release result to MyChart     Answer:   Immediate        RTC in 2 weeks. Labor precautions reviewed.    Patient seen and evaluated with Dr. Rothman.    Renetta Nelson MD     I saw and evaluated the patient. I personally obtained the key and critical portions of the history and physical exam or was physically present for key and critical portions performed by the resident/fellow. I reviewed the  resident/fellow's documentation and discussed the patient with the resident/fellow. I agree with the resident/fellow's medical decision making as documented in the note.     Briefly,  at 31w0d here for PNV. Wasn't able to  insulin due to insurance coverage issues. NPH switched to Lantus at the same dose. Normotensive today. Patient reports periods of regular contractions, not currently dolores however cervix checked and 3cm dilated. Indications to present to L&D reviewed. RTC I 2 weeks for visit. Start weekly  testing next week    Abdon Rothman MD  [facility]

## 2024-02-08 ENCOUNTER — ROUTINE PRENATAL (OUTPATIENT)
Dept: MATERNAL FETAL MEDICINE | Facility: CLINIC | Age: 24
End: 2024-02-08
Payer: MEDICAID

## 2024-02-08 VITALS
SYSTOLIC BLOOD PRESSURE: 125 MMHG | HEART RATE: 118 BPM | DIASTOLIC BLOOD PRESSURE: 74 MMHG | BODY MASS INDEX: 35.45 KG/M2 | WEIGHT: 213 LBS

## 2024-02-08 DIAGNOSIS — O24.414 INSULIN CONTROLLED GESTATIONAL DIABETES MELLITUS (GDM) IN THIRD TRIMESTER (HHS-HCC): Primary | ICD-10-CM

## 2024-02-08 DIAGNOSIS — O26.893 PREGNANCY HEADACHE IN THIRD TRIMESTER (HHS-HCC): ICD-10-CM

## 2024-02-08 DIAGNOSIS — O24.419 GESTATIONAL DIABETES MELLITUS (GDM) IN THIRD TRIMESTER, GESTATIONAL DIABETES METHOD OF CONTROL UNSPECIFIED (HHS-HCC): ICD-10-CM

## 2024-02-08 DIAGNOSIS — R10.2 PELVIC PAIN AFFECTING PREGNANCY IN SECOND TRIMESTER, ANTEPARTUM (HHS-HCC): ICD-10-CM

## 2024-02-08 DIAGNOSIS — Z23 NEED FOR TDAP VACCINATION: ICD-10-CM

## 2024-02-08 DIAGNOSIS — R51.9 PREGNANCY HEADACHE IN THIRD TRIMESTER (HHS-HCC): ICD-10-CM

## 2024-02-08 DIAGNOSIS — O26.892 PELVIC PAIN AFFECTING PREGNANCY IN SECOND TRIMESTER, ANTEPARTUM (HHS-HCC): ICD-10-CM

## 2024-02-08 DIAGNOSIS — Z3A.31 31 WEEKS GESTATION OF PREGNANCY (HHS-HCC): ICD-10-CM

## 2024-02-08 DIAGNOSIS — O16.3 HYPERTENSION AFFECTING PREGNANCY IN THIRD TRIMESTER (HHS-HCC): ICD-10-CM

## 2024-02-08 LAB — GLUCOSE BLD MANUAL STRIP-MCNC: 139 MG/DL (ref 74–99)

## 2024-02-08 PROCEDURE — RXMED WILLOW AMBULATORY MEDICATION CHARGE

## 2024-02-08 PROCEDURE — 99214 OFFICE O/P EST MOD 30 MIN: CPT | Performed by: OBSTETRICS & GYNECOLOGY

## 2024-02-08 PROCEDURE — 82947 ASSAY GLUCOSE BLOOD QUANT: CPT | Performed by: OBSTETRICS & GYNECOLOGY

## 2024-02-08 PROCEDURE — 90715 TDAP VACCINE 7 YRS/> IM: CPT | Performed by: OBSTETRICS & GYNECOLOGY

## 2024-02-08 PROCEDURE — 99214 OFFICE O/P EST MOD 30 MIN: CPT | Mod: GC | Performed by: OBSTETRICS & GYNECOLOGY

## 2024-02-08 RX ORDER — INSULIN GLARGINE 100 [IU]/ML
INJECTION, SOLUTION SUBCUTANEOUS
Qty: 15 ML | Refills: 3 | Status: ON HOLD | OUTPATIENT
Start: 2024-02-08 | End: 2024-03-01 | Stop reason: SDUPTHER

## 2024-02-08 RX ORDER — RIBOFLAVIN (VITAMIN B2) 100 MG
100 TABLET ORAL DAILY
Qty: 60 TABLET | Refills: 0 | Status: SHIPPED | OUTPATIENT
Start: 2024-02-08 | End: 2024-03-18 | Stop reason: HOSPADM

## 2024-02-08 RX ORDER — PEN NEEDLE, DIABETIC 30 GX3/16"
NEEDLE, DISPOSABLE MISCELLANEOUS
Qty: 200 EACH | Refills: 3 | Status: SHIPPED | OUTPATIENT
Start: 2024-02-08

## 2024-02-08 RX ORDER — ISOPROPYL ALCOHOL 70 ML/100ML
SWAB TOPICAL
Qty: 200 EACH | Refills: 3 | Status: SHIPPED | OUTPATIENT
Start: 2024-02-08

## 2024-02-08 RX ORDER — SUMATRIPTAN 50 MG/1
50 TABLET, FILM COATED ORAL ONCE AS NEEDED
Qty: 1 TABLET | Refills: 0 | Status: SHIPPED | OUTPATIENT
Start: 2024-02-08

## 2024-02-08 RX ORDER — BACLOFEN 20 MG
500 TABLET ORAL DAILY
Qty: 60 TABLET | Refills: 1 | Status: SHIPPED | OUTPATIENT
Start: 2024-02-08 | End: 2024-03-18 | Stop reason: HOSPADM

## 2024-02-08 RX ORDER — INSULIN GLARGINE 100 [IU]/ML
INJECTION, SOLUTION SUBCUTANEOUS
Qty: 5 EACH | Refills: 3 | Status: SHIPPED | OUTPATIENT
Start: 2024-02-08 | End: 2024-02-08 | Stop reason: SDUPTHER

## 2024-02-08 ASSESSMENT — PAIN SCALES - GENERAL: PAINLEVEL_OUTOF10: 10 - WORST POSSIBLE PAIN

## 2024-02-08 ASSESSMENT — PAIN - FUNCTIONAL ASSESSMENT: PAIN_FUNCTIONAL_ASSESSMENT: 0-10

## 2024-02-10 ENCOUNTER — HOSPITAL ENCOUNTER (OUTPATIENT)
Facility: HOSPITAL | Age: 24
Discharge: HOME | End: 2024-02-10
Attending: OBSTETRICS & GYNECOLOGY | Admitting: OBSTETRICS & GYNECOLOGY
Payer: MEDICAID

## 2024-02-10 VITALS
OXYGEN SATURATION: 97 % | HEIGHT: 65 IN | WEIGHT: 213 LBS | DIASTOLIC BLOOD PRESSURE: 81 MMHG | BODY MASS INDEX: 35.49 KG/M2 | HEART RATE: 115 BPM | TEMPERATURE: 97.5 F | RESPIRATION RATE: 20 BRPM | SYSTOLIC BLOOD PRESSURE: 123 MMHG

## 2024-02-10 LAB
ALBUMIN SERPL BCP-MCNC: 3.7 G/DL (ref 3.4–5)
ALP SERPL-CCNC: 122 U/L (ref 33–110)
ALT SERPL W P-5'-P-CCNC: 6 U/L (ref 7–45)
ANION GAP SERPL CALC-SCNC: 15 MMOL/L (ref 10–20)
AST SERPL W P-5'-P-CCNC: 8 U/L (ref 9–39)
BILIRUB SERPL-MCNC: 0.4 MG/DL (ref 0–1.2)
BILIRUBIN, POC: NEGATIVE
BLOOD URINE, POC: NEGATIVE
BUN SERPL-MCNC: 8 MG/DL (ref 6–23)
CALCIUM SERPL-MCNC: 9.1 MG/DL (ref 8.6–10.6)
CHLORIDE SERPL-SCNC: 106 MMOL/L (ref 98–107)
CLARITY, POC: CLEAR
CO2 SERPL-SCNC: 20 MMOL/L (ref 21–32)
COLOR, POC: YELLOW
CREAT SERPL-MCNC: 0.44 MG/DL (ref 0.5–1.05)
EGFRCR SERPLBLD CKD-EPI 2021: >90 ML/MIN/1.73M*2
ERYTHROCYTE [DISTWIDTH] IN BLOOD BY AUTOMATED COUNT: 14.6 % (ref 11.5–14.5)
GLUCOSE BLD MANUAL STRIP-MCNC: 92 MG/DL (ref 74–99)
GLUCOSE SERPL-MCNC: 93 MG/DL (ref 74–99)
GLUCOSE URINE, POC: NEGATIVE
HCT VFR BLD AUTO: 37.5 % (ref 36–46)
HGB BLD-MCNC: 12.5 G/DL (ref 12–16)
KETONES, POC: POSITIVE
LDH SERPL L TO P-CCNC: 98 U/L (ref 84–246)
LEUKOCYTE EST, POC: NORMAL
MCH RBC QN AUTO: 27.8 PG (ref 26–34)
MCHC RBC AUTO-ENTMCNC: 33.3 G/DL (ref 32–36)
MCV RBC AUTO: 84 FL (ref 80–100)
NITRITE, POC: NEGATIVE
NRBC BLD-RTO: 0 /100 WBCS (ref 0–0)
PH, POC: 6
PLATELET # BLD AUTO: 237 X10*3/UL (ref 150–450)
POC APPEARANCE OF BODY FLUID: NORMAL
POTASSIUM SERPL-SCNC: 3.8 MMOL/L (ref 3.5–5.3)
PROT SERPL-MCNC: 6.2 G/DL (ref 6.4–8.2)
RBC # BLD AUTO: 4.49 X10*6/UL (ref 4–5.2)
SODIUM SERPL-SCNC: 137 MMOL/L (ref 136–145)
SPECIFIC GRAVITY, POC: 1.02
URINE PROTEIN, POC: NEGATIVE
UROBILINOGEN, POC: 0.2
WBC # BLD AUTO: 10.2 X10*3/UL (ref 4.4–11.3)

## 2024-02-10 PROCEDURE — 36415 COLL VENOUS BLD VENIPUNCTURE: CPT | Mod: 59

## 2024-02-10 PROCEDURE — 2500000001 HC RX 250 WO HCPCS SELF ADMINISTERED DRUGS (ALT 637 FOR MEDICARE OP)

## 2024-02-10 PROCEDURE — 82947 ASSAY GLUCOSE BLOOD QUANT: CPT | Mod: 59

## 2024-02-10 PROCEDURE — 80053 COMPREHEN METABOLIC PANEL: CPT

## 2024-02-10 PROCEDURE — 83615 LACTATE (LD) (LDH) ENZYME: CPT

## 2024-02-10 PROCEDURE — 36415 COLL VENOUS BLD VENIPUNCTURE: CPT

## 2024-02-10 PROCEDURE — 85027 COMPLETE CBC AUTOMATED: CPT

## 2024-02-10 PROCEDURE — 82570 ASSAY OF URINE CREATININE: CPT | Performed by: STUDENT IN AN ORGANIZED HEALTH CARE EDUCATION/TRAINING PROGRAM

## 2024-02-10 RX ORDER — ONDANSETRON HYDROCHLORIDE 2 MG/ML
4 INJECTION, SOLUTION INTRAVENOUS EVERY 6 HOURS PRN
Status: DISCONTINUED | OUTPATIENT
Start: 2024-02-10 | End: 2024-02-11 | Stop reason: HOSPADM

## 2024-02-10 RX ORDER — DIPHENHYDRAMINE HYDROCHLORIDE 50 MG/ML
25 INJECTION INTRAMUSCULAR; INTRAVENOUS ONCE
Status: COMPLETED | OUTPATIENT
Start: 2024-02-10 | End: 2024-02-10

## 2024-02-10 RX ORDER — DIPHENHYDRAMINE HCL 25 MG
25 CAPSULE ORAL ONCE
Status: COMPLETED | OUTPATIENT
Start: 2024-02-10 | End: 2024-02-10

## 2024-02-10 RX ORDER — ACETAMINOPHEN 325 MG/1
975 TABLET ORAL ONCE
Status: COMPLETED | OUTPATIENT
Start: 2024-02-10 | End: 2024-02-10

## 2024-02-10 RX ORDER — CYCLOBENZAPRINE HCL 10 MG
10 TABLET ORAL ONCE
Status: COMPLETED | OUTPATIENT
Start: 2024-02-10 | End: 2024-02-10

## 2024-02-10 RX ORDER — METOCLOPRAMIDE 10 MG/1
10 TABLET ORAL ONCE
Status: COMPLETED | OUTPATIENT
Start: 2024-02-10 | End: 2024-02-10

## 2024-02-10 RX ORDER — ONDANSETRON 4 MG/1
4 TABLET, FILM COATED ORAL EVERY 6 HOURS PRN
Status: DISCONTINUED | OUTPATIENT
Start: 2024-02-10 | End: 2024-02-11 | Stop reason: HOSPADM

## 2024-02-10 RX ORDER — METOCLOPRAMIDE HYDROCHLORIDE 5 MG/ML
10 INJECTION INTRAMUSCULAR; INTRAVENOUS ONCE
Status: COMPLETED | OUTPATIENT
Start: 2024-02-10 | End: 2024-02-10

## 2024-02-10 RX ADMIN — CYCLOBENZAPRINE 10 MG: 10 TABLET, FILM COATED ORAL at 21:34

## 2024-02-10 RX ADMIN — ACETAMINOPHEN 975 MG: 325 TABLET ORAL at 21:34

## 2024-02-10 RX ADMIN — DIPHENHYDRAMINE HYDROCHLORIDE 25 MG: 25 CAPSULE ORAL at 21:34

## 2024-02-10 RX ADMIN — METOCLOPRAMIDE 10 MG: 10 TABLET ORAL at 21:34

## 2024-02-10 SDOH — ECONOMIC STABILITY: HOUSING INSECURITY: DO YOU FEEL UNSAFE GOING BACK TO THE PLACE WHERE YOU ARE LIVING?: NO

## 2024-02-10 SDOH — HEALTH STABILITY: MENTAL HEALTH: WISH TO BE DEAD (PAST 1 MONTH): NO

## 2024-02-10 SDOH — SOCIAL STABILITY: SOCIAL INSECURITY: PHYSICAL ABUSE: DENIES

## 2024-02-10 SDOH — SOCIAL STABILITY: SOCIAL INSECURITY: ABUSE SCREEN: ADULT

## 2024-02-10 SDOH — HEALTH STABILITY: MENTAL HEALTH: SUICIDAL BEHAVIOR (LIFETIME): NO

## 2024-02-10 SDOH — SOCIAL STABILITY: SOCIAL INSECURITY: DO YOU FEEL ANYONE HAS EXPLOITED OR TAKEN ADVANTAGE OF YOU FINANCIALLY OR OF YOUR PERSONAL PROPERTY?: NO

## 2024-02-10 SDOH — HEALTH STABILITY: MENTAL HEALTH: HAVE YOU USED ANY PRESCRIPTION DRUGS OTHER THAN PRESCRIBED IN THE PAST 12 MONTHS?: NO

## 2024-02-10 SDOH — HEALTH STABILITY: MENTAL HEALTH: HAVE YOU USED ANY SUBSTANCES (CANABIS, COCAINE, HEROIN, HALLUCINOGENS, INHALANTS, ETC.) IN THE PAST 12 MONTHS?: NO

## 2024-02-10 SDOH — SOCIAL STABILITY: SOCIAL INSECURITY: HAS ANYONE EVER THREATENED TO HURT YOUR FAMILY OR YOUR PETS?: NO

## 2024-02-10 SDOH — SOCIAL STABILITY: SOCIAL INSECURITY: VERBAL ABUSE: DENIES

## 2024-02-10 SDOH — SOCIAL STABILITY: SOCIAL INSECURITY: ARE YOU OR HAVE YOU BEEN THREATENED OR ABUSED PHYSICALLY, EMOTIONALLY, OR SEXUALLY BY ANYONE?: NO

## 2024-02-10 SDOH — HEALTH STABILITY: MENTAL HEALTH: NON-SPECIFIC ACTIVE SUICIDAL THOUGHTS (PAST 1 MONTH): NO

## 2024-02-10 SDOH — SOCIAL STABILITY: SOCIAL INSECURITY: DOES ANYONE TRY TO KEEP YOU FROM HAVING/CONTACTING OTHER FRIENDS OR DOING THINGS OUTSIDE YOUR HOME?: NO

## 2024-02-10 SDOH — HEALTH STABILITY: MENTAL HEALTH: WERE YOU ABLE TO COMPLETE ALL THE BEHAVIORAL HEALTH SCREENINGS?: YES

## 2024-02-10 SDOH — SOCIAL STABILITY: SOCIAL INSECURITY: ARE THERE ANY APPARENT SIGNS OF INJURIES/BEHAVIORS THAT COULD BE RELATED TO ABUSE/NEGLECT?: NO

## 2024-02-10 SDOH — SOCIAL STABILITY: SOCIAL INSECURITY: HAVE YOU HAD THOUGHTS OF HARMING ANYONE ELSE?: NO

## 2024-02-10 ASSESSMENT — PAIN SCALES - GENERAL
PAINLEVEL_OUTOF10: 7
PAINLEVEL: 6

## 2024-02-10 ASSESSMENT — PATIENT HEALTH QUESTIONNAIRE - PHQ9
SUM OF ALL RESPONSES TO PHQ9 QUESTIONS 1 & 2: 0
1. LITTLE INTEREST OR PLEASURE IN DOING THINGS: NOT AT ALL
2. FEELING DOWN, DEPRESSED OR HOPELESS: NOT AT ALL

## 2024-02-10 ASSESSMENT — PAIN DESCRIPTION - DESCRIPTORS: DESCRIPTORS: ACHING

## 2024-02-11 LAB
CREAT UR-MCNC: 118.1 MG/DL (ref 20–320)
PROT UR-ACNC: 21 MG/DL (ref 5–24)
PROT/CREAT UR: 0.18 MG/MG CREAT (ref 0–0.17)

## 2024-02-11 NOTE — H&P
Obstetrical Triage History and Physical     Tranmayda Grnager is a 23 y.o.  at 31w2d     Chief Complaint: Pregnancy Problem, Headache (6/10), and Contractions    Assessment/Plan    R/o siPEC w/ SF  - Headache 10/10, has chronic headaches during pregnancy  - Treated with tylenol, reglan, benadryl, needs reassessment  - BP mild range in main ED, severe ranges reported per patient at home  - HELLP labs pending  - P:C pending    False labor   - Cervix: 1.5/50/-3, unchanged from prior exam  - TOCO: quiet  - S/sx of labor reviewed  - Recommended tylenol, warm showers, hot packs for discomfort     IUP at 31w2d   - NST appropriate for gestational age   - Good fetal movement  - Precautions to return discussed     Maternal Well-being  - Vital signs stable and WNL  - All questions and concerns addressed     Plan signed out to night team.  Reassess headache, cycled blood pressures, and follow-up HELLP labs.    Verena Deshpande, APRN-CNP      Active Problems:  There are no active Hospital Problems.      Pregnancy Problems (from 10/19/23 to present)       Problem Noted Resolved    History of postpartum hemorrhage, currently pregnant 2024 by Renetta Telles MD No    Priority:  Medium      History of shoulder dystocia in prior pregnancy, currently pregnant 2024 by Renetta Telles MD No    Priority:  Medium      Overview Addendum 2024  1:55 PM by Renetta Telles MD     , 36.0wks, sPEC, A1DM   No residual deficits in that child, no shoulder in x2 35w deliveries (P1 and P3)         Insulin controlled gestational diabetes mellitus (GDM) in third trimester 2024 by Renetta Baldwin, DARLENE No    Priority:  Medium      Overview Addendum 2024  1:27 PM by Abdon Rothman MD     By 1-hour 244    [x] MFM Consult/education  [ ] Serial growth ultrasounds    Growth US: EFW 1635g 27%, AC 11%. AFV normal    Fetal surveillance:  [x] Weekly at 32 weeks  [] Twice weekly at 36 weeks      2024 Begin NPH 10/16  2024:  Switched to Lantus 10/16         31 weeks gestation of pregnancy 1/16/2024 by PRASANTH Avery-CNP No    Priority:  Medium      Overview Addendum 2/8/2024  1:23 PM by Abdon Rothman MD     Dating:   [x] Initial BMI: 35  [x] Prenatal Labs: reviewed and wnl (11/2)  [x] Aneuploidy Screening: RR First Check- normal quad screen (after corrected for GA)  [x] Baby ASA: compliant  [x] Anatomy US: wnl   [x] 1hr GCT at 24-28wks: 1-hr 244, GDM   [x] Tdap (27-36wks): 2/8/2024   [x] Flu Shot: 9/2023  [x] COVID vaccine:  booster 2/1/24  [x] Rhogam (if Rh neg): Rh+ not indicated   [] GBS at 36 wks:  [x] Breastfeeding: yes, wants to buy hands free pump, declined sending rx  [x] PPBC: s/p BS (was pregnant right before tubal, had negative UPT prior)  [] 39 weeks discussion of IOL vs. Expectant management:  [] Mode of delivery:  anticipate vaginal, had prior PPH with first child and Shoulder dystocia with P2, no residual deficits for that child         History of pre-eclampsia in prior pregnancy, currently pregnant 1/16/2024 by PRASANTH Avery-CNP No    Priority:  Medium      Overview Addendum 2/1/2024  1:55 PM by Renetta Telles MD     On Asa ppx         Hypertension affecting pregnancy in third trimester 11/2/2023 by Dylon Song MD No    Priority:  Medium      Overview Addendum 2/6/2024 12:44 PM by Abdon Rothman MD     Not on meds, asa ppx  Rx BP cuff  Home Bps mostly normotensive, some mild range   Baseline PEC labs wnl, 1/16 P:C 0.11  [ ] Serial growth           Abnormal quad screen 11/2/2023 by Dylon Song MD No    Priority:  Medium      Overview Addendum 2/1/2024 10:27 AM by Renetta Telles MD     NORMAL when corrected for ZABRINA            Pelvic pain affecting pregnancy in second trimester, antepartum 10/19/2023 by Zoila Wolf MD No    Priority:  Medium      Overview Signed 10/19/2023 12:53 PM by Zoila Wolf MD     - s/p ED visits x2, elevated WBC otherwise labs wnl  - CT unable to be completed  to r/o appendicitis   - symptoms improved today                Subjective   Traniece is here for headaches and contractions.  Has has headaches this pregnancy but was unable to  prescriptions for medications this week.  Rates headache 10/10.  Has not taken anything today for the headache.  Contractions are every 10min apart.  Irregular.  Denies VB, LOF, and endorses good fetal movement.    Of note, patient had mild range blood pressure on arrival to ED.  She reports that she has had several severe ranges at home.  Has history of cHTN. Not currently on medications.  Blood pressures have been normotensive this pregnancy.     Obstetrical History   OB History    Para Term  AB Living   5 3 0 3 1 3   SAB IAB Ectopic Multiple Live Births           3      # Outcome Date GA Lbr Marcel/2nd Weight Sex Delivery Anes PTL Lv   5 Current            4  2023 35w0d    Vag-Spont   [facility]      Complications: Preeclampsia, severe, unspecified trimester   3   36w0d    Vag-Spont   [facility]      Complications: Shoulder Dystocia, Preeclampsia, severe, unspecified trimester, Gestational diabetes, diet controlled   2   35w0d    Vag-Spont   [facility]      Complications: Hemorrhage, Preeclampsia,  premature rupture of membranes (PPROM) with unknown onset of labor   1 AB                Past Medical History  Past Medical History:   Diagnosis Date    Degenerative myopia, bilateral 2020    Bilateral degenerative progressive high myopia    Degenerative myopia, bilateral 2020    Pathologic myopia, both eyes    Encounter for insertion of intrauterine contraceptive device 2019    Encounter for insertion of copper IUD    Encounter for pregnancy test, result negative 10/09/2020    Urine pregnancy test negative    Encounter for pregnancy test, result positive 2020    Pregnancy confirmed by positive urine test    Encounter for screening for infections with a predominantly sexual mode of  transmission 12/11/2020    Routine screening for STI (sexually transmitted infection)    Encounter for screening for malignant neoplasm of cervix 09/17/2020    Cervical cancer screening    Encounter for surveillance of contraceptives, unspecified 08/01/2019    Encounter for surveillance of contraceptive device    History of gestational diabetes 10/18/2023    For early 1-hr GTT    Lattice degeneration of retina, 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    Myopia, unspecified eye 09/10/2015    High myopia    Other specified health status     No pertinent past medical history    Personal history of other infectious and parasitic diseases 12/05/2018    History of herpes labialis    Unspecified chorioretinal scars, 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     Medications  Medications Prior to Admission   Medication Sig Dispense Refill Last Dose    alcohol swabs pads, medicated Use 1, up to 5 times a day 200 each 3     aspirin 81 mg chewable tablet CHEW 2 TABLETS BY MOUTH ONCE DAILY 60 tablet 3 2/9/2024    blood sugar diagnostic (Blood Glucose Test) strip Use 1 strip 4-6 times a day during pregnancy 150 each 3     blood sugar diagnostic (OneTouch Verio test strips) strip Use 1 strip 4-6 times per day to test blood sugar during pregnancy 150 each 3     blood-glucose meter (Accu-Chek Guide Glucose Meter) misc Use for testing blood sugar in pregnancy 1 each 0     blood-glucose meter misc Use for testing blood sugar fasting and 1 hour after  "each meal. 4-6 times per day during pregnancy 1 each 0     cyclobenzaprine (Flexeril) 10 mg tablet Take 0.5 tablets (5 mg) by mouth 3 times a day as needed for muscle spasms. 14 tablet 0 Unknown    insulin glargine (Lantus) 100 unit/mL (3 mL) pen Inject 10 units in the morning and 16 units before bed. 15 mL 3 Unknown    lancets 33 gauge misc Use 1 lancet 4-6 times per day during pregnancy 200 each 3     lancets 33 gauge misc Use 1 lancet 4-6 times per day during pregnancy 200 each 3     magnesium oxide 500 mg tablet Take 1 tablet (500 mg) by mouth once daily. 60 tablet 1 Unknown    metoclopramide (Reglan) 10 mg tablet Take 1 tablet (10 mg) by mouth every 8 hours if needed (headache). 12 tablet 0 Unknown    miscellaneous medical supply (Blood Pressure Cuff) misc USE TO TEST BLOOD PRESSURE 1 each 0     pen needle, diabetic (Pen Needle) 32 gauge x 5/32\" needle Use 1 per injection, up to 5 times a day 200 each 3     prenatal vitamin, iron-folic, 27 mg iron-800 mcg folic acid tablet TAKE 1 TABLET BY MOUTH ONCE DAILY 30 tablet 4 2/9/2024    riboflavin (vitamin B2) 100 mg tablet tablet Take 1 tablet (100 mg) by mouth once daily. 60 tablet 0 Unknown    SUMAtriptan (Imitrex) 50 mg tablet Take 1 tablet (50 mg) by mouth 1 time if needed for migraine for up to 1 dose. May repeat dose once in 2 hours if no relief.  Do not exceed 2 doses in 24 hours. 1 tablet 0 Unknown       Objective    Last Vitals  Temp Pulse Resp BP MAP O2 Sat   36.4 °C (97.5 °F) (!) 115 20 123/81   97 %     Physical Examination  Physical Exam  Exam conducted with a chaperone present.   Constitutional:       Appearance: Normal appearance.   HENT:      Head: Normocephalic.   Cardiovascular:      Rate and Rhythm: Normal rate.   Pulmonary:      Effort: Pulmonary effort is normal.   Abdominal:      Comments: Gravid   Genitourinary:     Comments: Cervix: 1.5/40/-3    FHT: 130, mod variability, +accels, -decels   [place]: quiet  Musculoskeletal:         General: Normal " range of motion.   Skin:     General: Skin is warm.   Neurological:      Mental Status: She is alert and oriented to person, place, and time.   Psychiatric:         Mood and Affect: Mood normal.         Behavior: Behavior normal.        Lab Review  Labs in chart were reviewed.

## 2024-02-11 NOTE — SIGNIFICANT EVENT
Patient eloped from triage prior to completion of workup.   HELLP labs wnl.    Discussed with Dr Meade.     Radha Navarro MD

## 2024-02-12 ENCOUNTER — PHARMACY VISIT (OUTPATIENT)
Dept: PHARMACY | Facility: CLINIC | Age: 24
End: 2024-02-12
Payer: MEDICAID

## 2024-02-20 PROBLEM — Z3A.33 33 WEEKS GESTATION OF PREGNANCY (HHS-HCC): Status: ACTIVE | Noted: 2024-01-16

## 2024-02-20 PROBLEM — O99.343 DEPRESSION AFFECTING PREGNANCY IN THIRD TRIMESTER, ANTEPARTUM (HHS-HCC): Status: ACTIVE | Noted: 2023-10-18

## 2024-02-20 PROBLEM — F32.A DEPRESSION AFFECTING PREGNANCY IN THIRD TRIMESTER, ANTEPARTUM (HHS-HCC): Status: ACTIVE | Noted: 2023-10-18

## 2024-02-22 ENCOUNTER — HOSPITAL ENCOUNTER (OUTPATIENT)
Facility: HOSPITAL | Age: 24
End: 2024-02-22
Attending: OBSTETRICS & GYNECOLOGY | Admitting: OBSTETRICS & GYNECOLOGY
Payer: MEDICAID

## 2024-02-22 ENCOUNTER — HOSPITAL ENCOUNTER (OUTPATIENT)
Facility: HOSPITAL | Age: 24
Discharge: HOME | End: 2024-02-22
Attending: OBSTETRICS & GYNECOLOGY | Admitting: OBSTETRICS & GYNECOLOGY
Payer: MEDICAID

## 2024-02-22 VITALS
TEMPERATURE: 97.7 F | RESPIRATION RATE: 16 BRPM | SYSTOLIC BLOOD PRESSURE: 132 MMHG | HEART RATE: 103 BPM | HEIGHT: 65 IN | WEIGHT: 216.49 LBS | BODY MASS INDEX: 36.07 KG/M2 | OXYGEN SATURATION: 98 % | DIASTOLIC BLOOD PRESSURE: 76 MMHG

## 2024-02-22 LAB
BILIRUBIN, POC: NEGATIVE
BLOOD URINE, POC: POSITIVE
CLARITY, POC: CLEAR
COLOR, POC: YELLOW
GLUCOSE BLD MANUAL STRIP-MCNC: 176 MG/DL (ref 74–99)
GLUCOSE URINE, POC: NORMAL
KETONES, POC: NORMAL
LEUKOCYTE EST, POC: NEGATIVE
NITRITE, POC: NEGATIVE
PH, POC: 6
POC APPEARANCE OF BODY FLUID: NORMAL
SPECIFIC GRAVITY, POC: 1.03
URINE PROTEIN, POC: NEGATIVE
UROBILINOGEN, POC: 0.2

## 2024-02-22 PROCEDURE — 82947 ASSAY GLUCOSE BLOOD QUANT: CPT

## 2024-02-22 PROCEDURE — 99214 OFFICE O/P EST MOD 30 MIN: CPT

## 2024-02-22 PROCEDURE — 4500999001 HC ED NO CHARGE

## 2024-02-22 PROCEDURE — 87081 CULTURE SCREEN ONLY: CPT

## 2024-02-22 PROCEDURE — 99213 OFFICE O/P EST LOW 20 MIN: CPT | Performed by: STUDENT IN AN ORGANIZED HEALTH CARE EDUCATION/TRAINING PROGRAM

## 2024-02-22 PROCEDURE — 2500000002 HC RX 250 W HCPCS SELF ADMINISTERED DRUGS (ALT 637 FOR MEDICARE OP, ALT 636 FOR OP/ED)

## 2024-02-22 RX ORDER — INSULIN GLARGINE 100 [IU]/ML
10 INJECTION, SOLUTION SUBCUTANEOUS ONCE
Status: COMPLETED | OUTPATIENT
Start: 2024-02-22 | End: 2024-02-22

## 2024-02-22 RX ADMIN — INSULIN GLARGINE 10 UNITS: 100 INJECTION, SOLUTION SUBCUTANEOUS at 08:34

## 2024-02-22 SDOH — HEALTH STABILITY: MENTAL HEALTH: NON-SPECIFIC ACTIVE SUICIDAL THOUGHTS (PAST 1 MONTH): NO

## 2024-02-22 SDOH — HEALTH STABILITY: MENTAL HEALTH: WERE YOU ABLE TO COMPLETE ALL THE BEHAVIORAL HEALTH SCREENINGS?: YES

## 2024-02-22 SDOH — SOCIAL STABILITY: SOCIAL INSECURITY: VERBAL ABUSE: DENIES

## 2024-02-22 SDOH — SOCIAL STABILITY: SOCIAL INSECURITY: ARE YOU OR HAVE YOU BEEN THREATENED OR ABUSED PHYSICALLY, EMOTIONALLY, OR SEXUALLY BY ANYONE?: NO

## 2024-02-22 SDOH — SOCIAL STABILITY: SOCIAL INSECURITY: HAS ANYONE EVER THREATENED TO HURT YOUR FAMILY OR YOUR PETS?: NO

## 2024-02-22 SDOH — SOCIAL STABILITY: SOCIAL INSECURITY: PHYSICAL ABUSE: DENIES

## 2024-02-22 SDOH — SOCIAL STABILITY: SOCIAL INSECURITY: HAVE YOU HAD THOUGHTS OF HARMING ANYONE ELSE?: NO

## 2024-02-22 SDOH — ECONOMIC STABILITY: HOUSING INSECURITY: DO YOU FEEL UNSAFE GOING BACK TO THE PLACE WHERE YOU ARE LIVING?: NO

## 2024-02-22 SDOH — HEALTH STABILITY: MENTAL HEALTH: SUICIDAL BEHAVIOR (LIFETIME): NO

## 2024-02-22 SDOH — SOCIAL STABILITY: SOCIAL INSECURITY: DO YOU FEEL ANYONE HAS EXPLOITED OR TAKEN ADVANTAGE OF YOU FINANCIALLY OR OF YOUR PERSONAL PROPERTY?: NO

## 2024-02-22 SDOH — SOCIAL STABILITY: SOCIAL INSECURITY: ABUSE SCREEN: ADULT

## 2024-02-22 SDOH — SOCIAL STABILITY: SOCIAL INSECURITY: DOES ANYONE TRY TO KEEP YOU FROM HAVING/CONTACTING OTHER FRIENDS OR DOING THINGS OUTSIDE YOUR HOME?: NO

## 2024-02-22 SDOH — SOCIAL STABILITY: SOCIAL INSECURITY: ARE THERE ANY APPARENT SIGNS OF INJURIES/BEHAVIORS THAT COULD BE RELATED TO ABUSE/NEGLECT?: NO

## 2024-02-22 SDOH — HEALTH STABILITY: MENTAL HEALTH: HAVE YOU USED ANY PRESCRIPTION DRUGS OTHER THAN PRESCRIBED IN THE PAST 12 MONTHS?: NO

## 2024-02-22 SDOH — HEALTH STABILITY: MENTAL HEALTH: HAVE YOU USED ANY SUBSTANCES (CANABIS, COCAINE, HEROIN, HALLUCINOGENS, INHALANTS, ETC.) IN THE PAST 12 MONTHS?: NO

## 2024-02-22 SDOH — HEALTH STABILITY: MENTAL HEALTH: WISH TO BE DEAD (PAST 1 MONTH): NO

## 2024-02-22 ASSESSMENT — LIFESTYLE VARIABLES
HOW OFTEN DO YOU HAVE A DRINK CONTAINING ALCOHOL: NEVER
EVER FELT BAD OR GUILTY ABOUT YOUR DRINKING: NO
EVER HAD A DRINK FIRST THING IN THE MORNING TO STEADY YOUR NERVES TO GET RID OF A HANGOVER: NO
AUDIT-C TOTAL SCORE: 0
HOW OFTEN DO YOU HAVE 6 OR MORE DRINKS ON ONE OCCASION: NEVER
SKIP TO QUESTIONS 9-10: 1
HAVE PEOPLE ANNOYED YOU BY CRITICIZING YOUR DRINKING: NO
AUDIT-C TOTAL SCORE: 0
HOW MANY STANDARD DRINKS CONTAINING ALCOHOL DO YOU HAVE ON A TYPICAL DAY: PATIENT DOES NOT DRINK
HAVE YOU EVER FELT YOU SHOULD CUT DOWN ON YOUR DRINKING: NO

## 2024-02-22 ASSESSMENT — COLUMBIA-SUICIDE SEVERITY RATING SCALE - C-SSRS
2. HAVE YOU ACTUALLY HAD ANY THOUGHTS OF KILLING YOURSELF?: NO
1. IN THE PAST MONTH, HAVE YOU WISHED YOU WERE DEAD OR WISHED YOU COULD GO TO SLEEP AND NOT WAKE UP?: NO
6. HAVE YOU EVER DONE ANYTHING, STARTED TO DO ANYTHING, OR PREPARED TO DO ANYTHING TO END YOUR LIFE?: NO

## 2024-02-22 NOTE — H&P
Obstetrical Admission History and Physical- Triage     Scooter Granger is a 23 y.o.  at 33wd by 8 wk ultrasound presents for r/o PPROM/PTL.     Chief Complaint: Contractions and Leakage/Loss of Fluid    Assessment/Plan    r/o ROM  - Pooling/nitrazine/ferning negative x3  - SVE: 1.5/40/-3; stable from last week  - CTX: toco irritable    - GBS collected and sent    cHTN, hx of PEC  - normotensive  - No meds  - asymptomatic    GDMA2  - following with MFM  - insulin regimen: lantus 10/ however patient not taking due to her discomfort with pregnancy  - POCT 176; lantus 10 given in triage    Maternal Well-Being  - No acute distress  - Vitals stable and WNL    Fetal Well-Being  - NST reactive in triage  - Continue current prenatal care    Dispo  - Safe and stable for d/c to home  - Follow-up with OB provider as scheduled - Patient has appointment with MFM today     Reviewed labor precautions with patient. Discussed need to return to labor and delivery if patient has strong, regular contractions, leakage of fluid, vaginal bleeding, or decreased fetal movement. Patient expresses understanding. Safe and stable for discharge at this time.    Seen and discussed with Dr. Cristóbal Paulson MD PGY-1     Attending attestation:   I saw and evaluated the patient. I personally obtained the key and critical portions of the history and physical exam or was physically present for key and critical portions performed by the resident/VANESSA. I reviewed the resident/VANESSA's documentation and discussed the patient with the resident/VANESSA. I agree with the resident/VANESSA's medical decision making as documented in the note.     23 y.o.  at 33wd by 8 wk ultrasound presents for contractions/LOF. No evidence of PPROM/PTL. SVE unchanged from last week.  today. Patient reports she did not take her am insulin and does not want to take her insulin because she is very uncomfortable in her pregnancy and only wants to lay in  bed. Reports significant vaginal prolapse that she will plan to address with Urogyn during postpartum period. Discussed supportive measures such as belly band, girdle etc. but patient declines any intervention at this time. Patient has MFM appt with Dr. Rothman at 10 am today and plans to keep. Dr. Rothman informed about triage visit this morning.     Jeniffer Ambrocio MD  OBGYN Attending     [facility]   Scooter is here reporting LOF. She states at 0300 today she felt a big gush of fluid. Has not been leaking since. Also reporting q3 min contractions after that. Since then, they have spaced out to 5-7 min. Good fetal movement. Denies vaginal bleeding.    Patient does not report headaches, visual changes, chest pain, shortness of breath or RUQ pain      Pregnancy Problems (from 10/19/23 to present)       Problem Noted Resolved    History of postpartum hemorrhage, currently pregnant 1/31/2024 by Renetta Telles MD No    Priority:  Medium      History of shoulder dystocia in prior pregnancy, currently pregnant 1/31/2024 by Renetta Telles MD No    Priority:  Medium      Overview Addendum 2/1/2024  1:55 PM by Renetta Telles MD     2021, 36.0wks, sPEC, A1DM   No residual deficits in that child, no shoulder in x2 35w deliveries (P1 and P3)         Insulin controlled gestational diabetes mellitus (GDM) in third trimester 1/26/2024 by Renetta Baldwin, DARLENE No    Priority:  Medium      Overview Addendum 2/8/2024  1:27 PM by Abdon Rothman MD     By 1-hour 244    [x] MFM Consult/education  [ ] Serial growth ultrasounds   2/1 Growth US: EFW 1635g 27%, AC 11%. AFV normal    Fetal surveillance:  [x] Weekly at 32 weeks  [] Twice weekly at 36 weeks      2/5/2024 Begin NPH 10/16  2/8/2024: Switched to Lantus 10/16         33 weeks gestation of pregnancy 1/16/2024 by Brisa Hardy, APRN-CNP No    Priority:  Medium      Overview Addendum 2/8/2024  1:23 PM by Abdon Rothman MD     Dating:   [x] Initial BMI: 35  [x] Prenatal  Labs: reviewed and wnl (11/2)  [x] Aneuploidy Screening: RR First Check- normal quad screen (after corrected for GA)  [x] Baby ASA: compliant  [x] Anatomy US: wnl   [x] 1hr GCT at 24-28wks: 1-hr 244, GDM   [x] Tdap (27-36wks): 2/8/2024   [x] Flu Shot: 9/2023  [x] COVID vaccine:  booster 2/1/24  [x] Rhogam (if Rh neg): Rh+ not indicated   [] GBS at 36 wks:  [x] Breastfeeding: yes, wants to buy hands free pump, declined sending rx  [x] PPBC: s/p BS (was pregnant right before tubal, had negative UPT prior)  [] 39 weeks discussion of IOL vs. Expectant management:  [] Mode of delivery:  anticipate vaginal, had prior PPH with first child and Shoulder dystocia with P2, no residual deficits for that child         History of pre-eclampsia in prior pregnancy, currently pregnant 1/16/2024 by PRASANTH Avery-CNP No    Priority:  Medium      Overview Addendum 2/1/2024  1:55 PM by Renetta Telles MD     On Asa ppx         Hypertension affecting pregnancy in third trimester 11/2/2023 by Dylon Song MD No    Priority:  Medium      Overview Addendum 2/21/2024  5:30 PM by Renetta Nelson MD     Not on meds, asa ppx  Rx BP cuff  Home Bps mostly normotensive, some mild range   Baseline PEC labs wnl, 1/16 P:C 0.11  Visited triage for reported SRBPs at home and HA on 2/10/24. HA improved and HELLP labs wnl at that time. Pt left triage prior to completion of workup.  [ ] Serial growth           Pelvic pain affecting pregnancy in second trimester, antepartum 10/19/2023 by Zoila Wolf MD No    Priority:  Medium      Overview Signed 10/19/2023 12:53 PM by Zoila Wolf MD     - s/p ED visits x2, elevated WBC otherwise labs wnl  - CT unable to be completed to r/o appendicitis   - symptoms improved today          Depression affecting pregnancy in third trimester, antepartum 10/18/2023 by Anne Marie Vera No    Priority:  Medium      Overview Addendum 2/1/2024  1:55 PM by Renetta Telles MD     - on seroquel outside of pregnancy, not  taking currently  Mood good                     Obstetrical History   OB History    Para Term  AB Living   5 3 0 3 1 3   SAB IAB Ectopic Multiple Live Births           3      # Outcome Date GA Lbr Marcel/2nd Weight Sex Delivery Anes PTL Lv   5 Current            4  2023 35w0d    Vag-Spont   [facility]      Complications: Preeclampsia, severe, unspecified trimester   3   36w0d    Vag-Spont   [facility]      Complications: Shoulder Dystocia, Preeclampsia, severe, unspecified trimester, Gestational diabetes, diet controlled   2   35w0d    Vag-Spont   [facility]      Complications: Hemorrhage, Preeclampsia,  premature rupture of membranes (PPROM) with unknown onset of labor   1 AB                Past Medical History  Past Medical History:   Diagnosis Date    Degenerative myopia, bilateral 2020    Bilateral degenerative progressive high myopia    Degenerative myopia, bilateral 2020    Pathologic myopia, both eyes    Encounter for insertion of intrauterine contraceptive device 2019    Encounter for insertion of copper IUD    Encounter for pregnancy test, result negative 10/09/2020    Urine pregnancy test negative    Encounter for pregnancy test, result positive 2020    Pregnancy confirmed by positive urine test    Encounter for screening for infections with a predominantly sexual mode of transmission 2020    Routine screening for STI (sexually transmitted infection)    Encounter for screening for malignant neoplasm of cervix 2020    Cervical cancer screening    Encounter for surveillance of contraceptives, unspecified 2019    Encounter for surveillance of contraceptive device    History of gestational diabetes 10/18/2023    For early 1-hr GTT    Lattice degeneration of retina, 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    Myopia, unspecified eye 09/10/2015    High myopia    Other specified health status     No pertinent past medical  history    Personal history of other infectious and parasitic diseases 12/05/2018    History of herpes labialis    Unspecified chorioretinal scars, 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     Medications  Medications Prior to Admission   Medication Sig Dispense Refill Last Dose    alcohol swabs pads, medicated Use 1, up to 5 times a day 200 each 3 Unknown    aspirin 81 mg chewable tablet CHEW 2 TABLETS BY MOUTH ONCE DAILY 60 tablet 3     blood sugar diagnostic (Blood Glucose Test) strip Use 1 strip 4-6 times a day during pregnancy 150 each 3 Unknown    blood sugar diagnostic (OneTouch Verio test strips) strip Use 1 strip 4-6 times per day to test blood sugar during pregnancy 150 each 3 Unknown    blood-glucose meter (Accu-Chek Guide Glucose Meter) misc Use for testing blood sugar in pregnancy 1 each 0 Unknown    blood-glucose meter misc Use for testing blood sugar fasting and 1 hour after each meal. 4-6 times per day during pregnancy 1 each 0 Unknown    cyclobenzaprine (Flexeril) 10 mg tablet Take 0.5 tablets (5 mg) by mouth 3 times a day as needed for muscle spasms. 14 tablet 0 Unknown    insulin glargine (Lantus) 100 unit/mL (3 mL) pen Inject 10 units in the morning and 16 units before bed. 15 mL 3 Unknown    lancets 33 gauge misc Use 1 lancet 4-6 times per day during pregnancy 200 each 3 Unknown    lancets 33 gauge misc Use 1 lancet 4-6 times per day during pregnancy 200 each 3 Unknown    magnesium oxide 500 mg tablet Take 1 tablet (500 mg) by mouth  "once daily. 60 tablet 1 Unknown    metoclopramide (Reglan) 10 mg tablet Take 1 tablet (10 mg) by mouth every 8 hours if needed (headache). 12 tablet 0 Unknown    miscellaneous medical supply (Blood Pressure Cuff) misc USE TO TEST BLOOD PRESSURE 1 each 0     pen needle, diabetic (Pen Needle) 32 gauge x 5/32\" needle Use 1 per injection, up to 5 times a day 200 each 3 Unknown    prenatal vitamin, iron-folic, 27 mg iron-800 mcg folic acid tablet TAKE 1 TABLET BY MOUTH ONCE DAILY 30 tablet 4 Unknown    riboflavin (vitamin B2) 100 mg tablet tablet Take 1 tablet (100 mg) by mouth once daily. 60 tablet 0 Unknown    SUMAtriptan (Imitrex) 50 mg tablet Take 1 tablet (50 mg) by mouth 1 time if needed for migraine for up to 1 dose. May repeat dose once in 2 hours if no relief.  Do not exceed 2 doses in 24 hours. 1 tablet 0 Unknown       Objective    Last Vitals  Temp Pulse Resp BP MAP O2 Sat   36.5 °C (97.7 °F) (!) 113 16 135/83   96 %     Physical Examination  Cervical Exam  Dilation: 2  Effacement (%): 40  Fetal Station: -3  Method: Manual  OB Examiner: Khurram WATSON  Fetal Assessment  Movement: Present  Mode: External US  Baseline Fetal Heart Rate (bpm): 140 bpm  Multiple Births: No   Fetal Decelerations: No  Contraction Frequency: none      SSE: no pooling, nitrazine/ferning neg    Lab Review  Labs in chart were reviewed.    "

## 2024-02-24 LAB — GP B STREP GENITAL QL CULT: NORMAL

## 2024-02-29 ENCOUNTER — HOSPITAL ENCOUNTER (OUTPATIENT)
Facility: HOSPITAL | Age: 24
Setting detail: OBSERVATION
Discharge: HOME | End: 2024-03-01
Attending: OBSTETRICS & GYNECOLOGY | Admitting: OBSTETRICS & GYNECOLOGY
Payer: MEDICAID

## 2024-02-29 ENCOUNTER — APPOINTMENT (OUTPATIENT)
Dept: OBSTETRICS AND GYNECOLOGY | Facility: CLINIC | Age: 24
End: 2024-02-29
Payer: MEDICAID

## 2024-02-29 DIAGNOSIS — O24.414 INSULIN CONTROLLED GESTATIONAL DIABETES MELLITUS (GDM) IN THIRD TRIMESTER (HHS-HCC): ICD-10-CM

## 2024-02-29 LAB
ALBUMIN SERPL BCP-MCNC: 3 G/DL (ref 3.4–5)
ALBUMIN SERPL BCP-MCNC: 3.7 G/DL (ref 3.4–5)
ALP SERPL-CCNC: 139 U/L (ref 33–110)
ALP SERPL-CCNC: 174 U/L (ref 33–110)
ALT SERPL W P-5'-P-CCNC: 4 U/L (ref 7–45)
ALT SERPL W P-5'-P-CCNC: 6 U/L (ref 7–45)
ANION GAP SERPL CALC-SCNC: 17 MMOL/L (ref 10–20)
ANION GAP SERPL CALC-SCNC: 20 MMOL/L (ref 10–20)
AST SERPL W P-5'-P-CCNC: 6 U/L (ref 9–39)
AST SERPL W P-5'-P-CCNC: 7 U/L (ref 9–39)
B-OH-BUTYR SERPL-SCNC: 0.75 MMOL/L (ref 0.02–0.27)
B-OH-BUTYR SERPL-SCNC: 1.08 MMOL/L (ref 0.02–0.27)
BASE EXCESS BLDV CALC-SCNC: -5.5 MMOL/L (ref -2–3)
BILIRUB SERPL-MCNC: 0.3 MG/DL (ref 0–1.2)
BILIRUB SERPL-MCNC: 0.3 MG/DL (ref 0–1.2)
BODY TEMPERATURE: 37 DEGREES CELSIUS
BUN SERPL-MCNC: 10 MG/DL (ref 6–23)
BUN SERPL-MCNC: 12 MG/DL (ref 6–23)
CALCIUM SERPL-MCNC: 8.7 MG/DL (ref 8.6–10.6)
CALCIUM SERPL-MCNC: 9.2 MG/DL (ref 8.6–10.6)
CHLORIDE SERPL-SCNC: 103 MMOL/L (ref 98–107)
CHLORIDE SERPL-SCNC: 106 MMOL/L (ref 98–107)
CLUE CELLS SPEC QL WET PREP: NORMAL
CO2 SERPL-SCNC: 16 MMOL/L (ref 21–32)
CO2 SERPL-SCNC: 17 MMOL/L (ref 21–32)
CREAT SERPL-MCNC: 0.38 MG/DL (ref 0.5–1.05)
CREAT SERPL-MCNC: 0.64 MG/DL (ref 0.5–1.05)
CREAT UR-MCNC: 182.5 MG/DL (ref 20–320)
EGFRCR SERPLBLD CKD-EPI 2021: >90 ML/MIN/1.73M*2
EGFRCR SERPLBLD CKD-EPI 2021: >90 ML/MIN/1.73M*2
ERYTHROCYTE [DISTWIDTH] IN BLOOD BY AUTOMATED COUNT: 14.4 % (ref 11.5–14.5)
GLUCOSE BLD MANUAL STRIP-MCNC: 136 MG/DL (ref 74–99)
GLUCOSE BLD MANUAL STRIP-MCNC: 152 MG/DL (ref 74–99)
GLUCOSE BLD MANUAL STRIP-MCNC: 187 MG/DL (ref 74–99)
GLUCOSE BLD MANUAL STRIP-MCNC: 209 MG/DL (ref 74–99)
GLUCOSE SERPL-MCNC: 149 MG/DL (ref 74–99)
GLUCOSE SERPL-MCNC: 200 MG/DL (ref 74–99)
HCO3 BLDV-SCNC: 17.7 MMOL/L (ref 22–26)
HCT VFR BLD AUTO: 39.9 % (ref 36–46)
HGB BLD-MCNC: 12.8 G/DL (ref 12–16)
INHALED O2 CONCENTRATION: 21 %
LDH SERPL L TO P-CCNC: 141 U/L (ref 84–246)
MCH RBC QN AUTO: 26.4 PG (ref 26–34)
MCHC RBC AUTO-ENTMCNC: 32.1 G/DL (ref 32–36)
MCV RBC AUTO: 82 FL (ref 80–100)
NRBC BLD-RTO: 0 /100 WBCS (ref 0–0)
OXYHGB MFR BLDV: 90.8 % (ref 45–75)
PCO2 BLDV: 28 MM HG (ref 41–51)
PH BLDV: 7.41 PH (ref 7.33–7.43)
PLATELET # BLD AUTO: 274 X10*3/UL (ref 150–450)
PO2 BLDV: 63 MM HG (ref 35–45)
POC APPEARANCE, URINE: CLEAR
POC BILIRUBIN, URINE: NEGATIVE
POC BLOOD, URINE: ABNORMAL
POC COLOR, URINE: YELLOW
POC GLUCOSE, URINE: ABNORMAL MG/DL
POC KETONES, URINE: ABNORMAL MG/DL
POC LEUKOCYTES, URINE: NEGATIVE
POC NITRITE,URINE: NEGATIVE
POC PH, URINE: 5.5 PH
POC PROTEIN, URINE: ABNORMAL MG/DL
POC SPECIFIC GRAVITY, URINE: >=1.03
POC UROBILINOGEN, URINE: 0.2 EU/DL
POTASSIUM SERPL-SCNC: 3.8 MMOL/L (ref 3.5–5.3)
POTASSIUM SERPL-SCNC: 3.8 MMOL/L (ref 3.5–5.3)
PROT SERPL-MCNC: 5.4 G/DL (ref 6.4–8.2)
PROT SERPL-MCNC: 6.3 G/DL (ref 6.4–8.2)
PROT UR-ACNC: 105 MG/DL (ref 5–24)
PROT/CREAT UR: 0.58 MG/MG CREAT (ref 0–0.17)
RBC # BLD AUTO: 4.84 X10*6/UL (ref 4–5.2)
SAO2 % BLDV: 93 % (ref 45–75)
SODIUM SERPL-SCNC: 135 MMOL/L (ref 136–145)
SODIUM SERPL-SCNC: 136 MMOL/L (ref 136–145)
T VAGINALIS SPEC QL WET PREP: NORMAL
WBC # BLD AUTO: 9.1 X10*3/UL (ref 4.4–11.3)
WBC VAG QL WET PREP: NORMAL
YEAST VAG QL WET PREP: NORMAL

## 2024-02-29 PROCEDURE — 96361 HYDRATE IV INFUSION ADD-ON: CPT

## 2024-02-29 PROCEDURE — 84075 ASSAY ALKALINE PHOSPHATASE: CPT | Performed by: NURSE PRACTITIONER

## 2024-02-29 PROCEDURE — G0378 HOSPITAL OBSERVATION PER HR: HCPCS

## 2024-02-29 PROCEDURE — 84156 ASSAY OF PROTEIN URINE: CPT | Performed by: NURSE PRACTITIONER

## 2024-02-29 PROCEDURE — 2500000004 HC RX 250 GENERAL PHARMACY W/ HCPCS (ALT 636 FOR OP/ED): Performed by: NURSE PRACTITIONER

## 2024-02-29 PROCEDURE — 82805 BLOOD GASES W/O2 SATURATION: CPT | Performed by: NURSE PRACTITIONER

## 2024-02-29 PROCEDURE — 86901 BLOOD TYPING SEROLOGIC RH(D): CPT | Performed by: STUDENT IN AN ORGANIZED HEALTH CARE EDUCATION/TRAINING PROGRAM

## 2024-02-29 PROCEDURE — 82947 ASSAY GLUCOSE BLOOD QUANT: CPT | Mod: 91

## 2024-02-29 PROCEDURE — 85027 COMPLETE CBC AUTOMATED: CPT | Performed by: NURSE PRACTITIONER

## 2024-02-29 PROCEDURE — 96360 HYDRATION IV INFUSION INIT: CPT

## 2024-02-29 PROCEDURE — 82010 KETONE BODYS QUAN: CPT | Performed by: NURSE PRACTITIONER

## 2024-02-29 PROCEDURE — 2500000002 HC RX 250 W HCPCS SELF ADMINISTERED DRUGS (ALT 637 FOR MEDICARE OP, ALT 636 FOR OP/ED): Performed by: NURSE PRACTITIONER

## 2024-02-29 PROCEDURE — 1120000001 HC OB PRIVATE ROOM DAILY

## 2024-02-29 PROCEDURE — 99222 1ST HOSP IP/OBS MODERATE 55: CPT | Performed by: NURSE PRACTITIONER

## 2024-02-29 PROCEDURE — 82947 ASSAY GLUCOSE BLOOD QUANT: CPT

## 2024-02-29 PROCEDURE — 83615 LACTATE (LD) (LDH) ENZYME: CPT | Performed by: NURSE PRACTITIONER

## 2024-02-29 PROCEDURE — 2500000002 HC RX 250 W HCPCS SELF ADMINISTERED DRUGS (ALT 637 FOR MEDICARE OP, ALT 636 FOR OP/ED): Performed by: STUDENT IN AN ORGANIZED HEALTH CARE EDUCATION/TRAINING PROGRAM

## 2024-02-29 PROCEDURE — 2500000004 HC RX 250 GENERAL PHARMACY W/ HCPCS (ALT 636 FOR OP/ED): Performed by: STUDENT IN AN ORGANIZED HEALTH CARE EDUCATION/TRAINING PROGRAM

## 2024-02-29 PROCEDURE — 36415 COLL VENOUS BLD VENIPUNCTURE: CPT | Performed by: NURSE PRACTITIONER

## 2024-02-29 PROCEDURE — 87086 URINE CULTURE/COLONY COUNT: CPT | Performed by: NURSE PRACTITIONER

## 2024-02-29 PROCEDURE — 87210 SMEAR WET MOUNT SALINE/INK: CPT | Performed by: NURSE PRACTITIONER

## 2024-02-29 RX ORDER — METHYLERGONOVINE MALEATE 0.2 MG/ML
0.2 INJECTION INTRAVENOUS ONCE AS NEEDED
Status: DISCONTINUED | OUTPATIENT
Start: 2024-02-29 | End: 2024-03-01 | Stop reason: HOSPADM

## 2024-02-29 RX ORDER — OXYTOCIN/0.9 % SODIUM CHLORIDE 30/500 ML
60 PLASTIC BAG, INJECTION (ML) INTRAVENOUS ONCE AS NEEDED
Status: DISCONTINUED | OUTPATIENT
Start: 2024-02-29 | End: 2024-03-01 | Stop reason: HOSPADM

## 2024-02-29 RX ORDER — CARBOPROST TROMETHAMINE 250 UG/ML
250 INJECTION, SOLUTION INTRAMUSCULAR ONCE AS NEEDED
Status: DISCONTINUED | OUTPATIENT
Start: 2024-02-29 | End: 2024-03-01 | Stop reason: HOSPADM

## 2024-02-29 RX ORDER — TRANEXAMIC ACID 100 MG/ML
1000 INJECTION, SOLUTION INTRAVENOUS ONCE AS NEEDED
Status: DISCONTINUED | OUTPATIENT
Start: 2024-02-29 | End: 2024-03-01 | Stop reason: HOSPADM

## 2024-02-29 RX ORDER — INSULIN GLARGINE 100 [IU]/ML
10 INJECTION, SOLUTION SUBCUTANEOUS
Status: DISCONTINUED | OUTPATIENT
Start: 2024-03-01 | End: 2024-03-01

## 2024-02-29 RX ORDER — MISOPROSTOL 200 UG/1
800 TABLET ORAL ONCE AS NEEDED
Status: DISCONTINUED | OUTPATIENT
Start: 2024-02-29 | End: 2024-03-01 | Stop reason: HOSPADM

## 2024-02-29 RX ORDER — ASPIRIN 325 MG
1 TABLET, DELAYED RELEASE (ENTERIC COATED) ORAL EVERY EVENING
Status: DISCONTINUED | OUTPATIENT
Start: 2024-02-29 | End: 2024-03-01 | Stop reason: HOSPADM

## 2024-02-29 RX ORDER — ONDANSETRON HYDROCHLORIDE 2 MG/ML
4 INJECTION, SOLUTION INTRAVENOUS EVERY 6 HOURS PRN
Status: DISCONTINUED | OUTPATIENT
Start: 2024-02-29 | End: 2024-03-01 | Stop reason: HOSPADM

## 2024-02-29 RX ORDER — INSULIN LISPRO 100 [IU]/ML
0-5 INJECTION, SOLUTION INTRAVENOUS; SUBCUTANEOUS EVERY 4 HOURS
Status: DISCONTINUED | OUTPATIENT
Start: 2024-02-29 | End: 2024-02-29

## 2024-02-29 RX ORDER — DEXTROSE 40 %
15 GEL (GRAM) ORAL
Status: DISCONTINUED | OUTPATIENT
Start: 2024-02-29 | End: 2024-03-01 | Stop reason: HOSPADM

## 2024-02-29 RX ORDER — SODIUM CHLORIDE, SODIUM LACTATE, POTASSIUM CHLORIDE, CALCIUM CHLORIDE 600; 310; 30; 20 MG/100ML; MG/100ML; MG/100ML; MG/100ML
125 INJECTION, SOLUTION INTRAVENOUS CONTINUOUS
Status: DISCONTINUED | OUTPATIENT
Start: 2024-02-29 | End: 2024-03-01 | Stop reason: HOSPADM

## 2024-02-29 RX ORDER — DEXTROSE 40 %
15 GEL (GRAM) ORAL
Status: DISCONTINUED | OUTPATIENT
Start: 2024-02-29 | End: 2024-02-29

## 2024-02-29 RX ORDER — INSULIN GLARGINE 100 [IU]/ML
16 INJECTION, SOLUTION SUBCUTANEOUS NIGHTLY
Status: DISCONTINUED | OUTPATIENT
Start: 2024-03-01 | End: 2024-03-01

## 2024-02-29 RX ORDER — INSULIN LISPRO 100 [IU]/ML
0-10 INJECTION, SOLUTION INTRAVENOUS; SUBCUTANEOUS EVERY 4 HOURS
Status: DISCONTINUED | OUTPATIENT
Start: 2024-02-29 | End: 2024-03-01

## 2024-02-29 RX ORDER — INSULIN GLARGINE 100 [IU]/ML
16 INJECTION, SOLUTION SUBCUTANEOUS NIGHTLY
Status: DISCONTINUED | OUTPATIENT
Start: 2024-02-29 | End: 2024-02-29

## 2024-02-29 RX ORDER — ONDANSETRON 4 MG/1
4 TABLET, FILM COATED ORAL EVERY 6 HOURS PRN
Status: DISCONTINUED | OUTPATIENT
Start: 2024-02-29 | End: 2024-02-29

## 2024-02-29 RX ORDER — HYDRALAZINE HYDROCHLORIDE 20 MG/ML
5 INJECTION INTRAMUSCULAR; INTRAVENOUS ONCE AS NEEDED
Status: DISCONTINUED | OUTPATIENT
Start: 2024-02-29 | End: 2024-02-29

## 2024-02-29 RX ORDER — HYDRALAZINE HYDROCHLORIDE 20 MG/ML
5 INJECTION INTRAMUSCULAR; INTRAVENOUS ONCE AS NEEDED
Status: DISCONTINUED | OUTPATIENT
Start: 2024-02-29 | End: 2024-03-01 | Stop reason: HOSPADM

## 2024-02-29 RX ORDER — INSULIN GLARGINE 100 [IU]/ML
16 INJECTION, SOLUTION SUBCUTANEOUS ONCE
Status: COMPLETED | OUTPATIENT
Start: 2024-02-29 | End: 2024-02-29

## 2024-02-29 RX ORDER — NIFEDIPINE 10 MG/1
10 CAPSULE ORAL ONCE AS NEEDED
Status: DISCONTINUED | OUTPATIENT
Start: 2024-02-29 | End: 2024-02-29

## 2024-02-29 RX ORDER — OXYTOCIN 10 [USP'U]/ML
10 INJECTION, SOLUTION INTRAMUSCULAR; INTRAVENOUS ONCE AS NEEDED
Status: DISCONTINUED | OUTPATIENT
Start: 2024-02-29 | End: 2024-03-01 | Stop reason: HOSPADM

## 2024-02-29 RX ORDER — LIDOCAINE HYDROCHLORIDE 10 MG/ML
30 INJECTION INFILTRATION; PERINEURAL ONCE AS NEEDED
Status: DISCONTINUED | OUTPATIENT
Start: 2024-02-29 | End: 2024-03-01 | Stop reason: HOSPADM

## 2024-02-29 RX ORDER — LABETALOL HYDROCHLORIDE 5 MG/ML
20 INJECTION, SOLUTION INTRAVENOUS ONCE AS NEEDED
Status: DISCONTINUED | OUTPATIENT
Start: 2024-02-29 | End: 2024-02-29

## 2024-02-29 RX ORDER — ONDANSETRON 4 MG/1
4 TABLET, FILM COATED ORAL EVERY 6 HOURS PRN
Status: DISCONTINUED | OUTPATIENT
Start: 2024-02-29 | End: 2024-03-01 | Stop reason: HOSPADM

## 2024-02-29 RX ORDER — NIFEDIPINE 10 MG/1
10 CAPSULE ORAL ONCE AS NEEDED
Status: DISCONTINUED | OUTPATIENT
Start: 2024-02-29 | End: 2024-03-01 | Stop reason: HOSPADM

## 2024-02-29 RX ORDER — TERBUTALINE SULFATE 1 MG/ML
0.25 INJECTION SUBCUTANEOUS ONCE AS NEEDED
Status: DISCONTINUED | OUTPATIENT
Start: 2024-02-29 | End: 2024-03-01 | Stop reason: HOSPADM

## 2024-02-29 RX ORDER — ONDANSETRON HYDROCHLORIDE 2 MG/ML
4 INJECTION, SOLUTION INTRAVENOUS EVERY 6 HOURS PRN
Status: DISCONTINUED | OUTPATIENT
Start: 2024-02-29 | End: 2024-02-29

## 2024-02-29 RX ORDER — LOPERAMIDE HYDROCHLORIDE 2 MG/1
4 CAPSULE ORAL EVERY 2 HOUR PRN
Status: DISCONTINUED | OUTPATIENT
Start: 2024-02-29 | End: 2024-03-01 | Stop reason: HOSPADM

## 2024-02-29 RX ORDER — DEXTROSE 50 % IN WATER (D50W) INTRAVENOUS SYRINGE
25 ONCE AS NEEDED
Status: DISCONTINUED | OUTPATIENT
Start: 2024-02-29 | End: 2024-02-29

## 2024-02-29 RX ORDER — LABETALOL HYDROCHLORIDE 5 MG/ML
20 INJECTION, SOLUTION INTRAVENOUS ONCE AS NEEDED
Status: DISCONTINUED | OUTPATIENT
Start: 2024-02-29 | End: 2024-03-01 | Stop reason: HOSPADM

## 2024-02-29 RX ORDER — METOCLOPRAMIDE 10 MG/1
10 TABLET ORAL EVERY 6 HOURS PRN
Status: DISCONTINUED | OUTPATIENT
Start: 2024-02-29 | End: 2024-03-01 | Stop reason: HOSPADM

## 2024-02-29 RX ORDER — METOCLOPRAMIDE HYDROCHLORIDE 5 MG/ML
10 INJECTION INTRAMUSCULAR; INTRAVENOUS EVERY 6 HOURS PRN
Status: DISCONTINUED | OUTPATIENT
Start: 2024-02-29 | End: 2024-03-01 | Stop reason: HOSPADM

## 2024-02-29 RX ADMIN — INSULIN LISPRO 2 UNITS: 100 INJECTION, SOLUTION INTRAVENOUS; SUBCUTANEOUS at 16:08

## 2024-02-29 RX ADMIN — SODIUM CHLORIDE, POTASSIUM CHLORIDE, SODIUM LACTATE AND CALCIUM CHLORIDE 1000 ML: 600; 310; 30; 20 INJECTION, SOLUTION INTRAVENOUS at 21:33

## 2024-02-29 RX ADMIN — INSULIN GLARGINE 16 UNITS: 100 INJECTION, SOLUTION SUBCUTANEOUS at 16:35

## 2024-02-29 RX ADMIN — SODIUM CHLORIDE, POTASSIUM CHLORIDE, SODIUM LACTATE AND CALCIUM CHLORIDE 125 ML/HR: 600; 310; 30; 20 INJECTION, SOLUTION INTRAVENOUS at 22:33

## 2024-02-29 RX ADMIN — SODIUM CHLORIDE, POTASSIUM CHLORIDE, SODIUM LACTATE AND CALCIUM CHLORIDE 1000 ML: 600; 310; 30; 20 INJECTION, SOLUTION INTRAVENOUS at 18:10

## 2024-02-29 RX ADMIN — INSULIN LISPRO 2 UNITS: 100 INJECTION, SOLUTION INTRAVENOUS; SUBCUTANEOUS at 21:51

## 2024-02-29 SDOH — SOCIAL STABILITY: SOCIAL INSECURITY: VERBAL ABUSE: DENIES

## 2024-02-29 SDOH — SOCIAL STABILITY: SOCIAL INSECURITY: ARE YOU OR HAVE YOU BEEN THREATENED OR ABUSED PHYSICALLY, EMOTIONALLY, OR SEXUALLY BY ANYONE?: NO

## 2024-02-29 SDOH — HEALTH STABILITY: MENTAL HEALTH: WISH TO BE DEAD (PAST 1 MONTH): NO

## 2024-02-29 SDOH — HEALTH STABILITY: MENTAL HEALTH: HAVE YOU USED ANY PRESCRIPTION DRUGS OTHER THAN PRESCRIBED IN THE PAST 12 MONTHS?: NO

## 2024-02-29 SDOH — SOCIAL STABILITY: SOCIAL INSECURITY: PHYSICAL ABUSE: DENIES

## 2024-02-29 SDOH — SOCIAL STABILITY: SOCIAL INSECURITY: HAS ANYONE EVER THREATENED TO HURT YOUR FAMILY OR YOUR PETS?: NO

## 2024-02-29 SDOH — SOCIAL STABILITY: SOCIAL INSECURITY: DO YOU FEEL ANYONE HAS EXPLOITED OR TAKEN ADVANTAGE OF YOU FINANCIALLY OR OF YOUR PERSONAL PROPERTY?: NO

## 2024-02-29 SDOH — ECONOMIC STABILITY: HOUSING INSECURITY: DO YOU FEEL UNSAFE GOING BACK TO THE PLACE WHERE YOU ARE LIVING?: NO

## 2024-02-29 SDOH — HEALTH STABILITY: MENTAL HEALTH: NON-SPECIFIC ACTIVE SUICIDAL THOUGHTS (PAST 1 MONTH): NO

## 2024-02-29 SDOH — SOCIAL STABILITY: SOCIAL INSECURITY: HAVE YOU HAD THOUGHTS OF HARMING ANYONE ELSE?: NO

## 2024-02-29 SDOH — HEALTH STABILITY: MENTAL HEALTH: HAVE YOU USED ANY SUBSTANCES (CANABIS, COCAINE, HEROIN, HALLUCINOGENS, INHALANTS, ETC.) IN THE PAST 12 MONTHS?: NO

## 2024-02-29 SDOH — HEALTH STABILITY: MENTAL HEALTH: WERE YOU ABLE TO COMPLETE ALL THE BEHAVIORAL HEALTH SCREENINGS?: YES

## 2024-02-29 SDOH — SOCIAL STABILITY: SOCIAL INSECURITY: ABUSE SCREEN: ADULT

## 2024-02-29 SDOH — SOCIAL STABILITY: SOCIAL INSECURITY: DOES ANYONE TRY TO KEEP YOU FROM HAVING/CONTACTING OTHER FRIENDS OR DOING THINGS OUTSIDE YOUR HOME?: NO

## 2024-02-29 SDOH — HEALTH STABILITY: MENTAL HEALTH: SUICIDAL BEHAVIOR (LIFETIME): NO

## 2024-02-29 SDOH — SOCIAL STABILITY: SOCIAL INSECURITY: ARE THERE ANY APPARENT SIGNS OF INJURIES/BEHAVIORS THAT COULD BE RELATED TO ABUSE/NEGLECT?: NO

## 2024-02-29 ASSESSMENT — LIFESTYLE VARIABLES
SKIP TO QUESTIONS 9-10: 1
AUDIT-C TOTAL SCORE: 0
AUDIT-C TOTAL SCORE: 0
HOW OFTEN DO YOU HAVE A DRINK CONTAINING ALCOHOL: NEVER
HOW MANY STANDARD DRINKS CONTAINING ALCOHOL DO YOU HAVE ON A TYPICAL DAY: PATIENT DOES NOT DRINK
HOW OFTEN DO YOU HAVE 6 OR MORE DRINKS ON ONE OCCASION: NEVER

## 2024-02-29 ASSESSMENT — PAIN SCALES - GENERAL
PAINLEVEL_OUTOF10: 0 - NO PAIN
PAINLEVEL_OUTOF10: 6

## 2024-02-29 ASSESSMENT — PATIENT HEALTH QUESTIONNAIRE - PHQ9
2. FEELING DOWN, DEPRESSED OR HOPELESS: NOT AT ALL
SUM OF ALL RESPONSES TO PHQ9 QUESTIONS 1 & 2: 0
1. LITTLE INTEREST OR PLEASURE IN DOING THINGS: NOT AT ALL

## 2024-02-29 ASSESSMENT — PAIN - FUNCTIONAL ASSESSMENT: PAIN_FUNCTIONAL_ASSESSMENT: 0-10

## 2024-02-29 NOTE — H&P
Obstetrical Admission History and Physical     Tranmayda Granger is a 23 y.o.  at 34w0d. ZABRINA: 2024 by 8.5 wk US. Estimated fetal weight on  at 31.1 wga: 1635g (27% with AC 14%). She has had prenatal care with [facility] .    Chief Complaint: Decreased Fetal Movement    Assessment/Plan      GDMA2  - current regimen:  Lantus 10/16  - not taking insulin   - POCT 209, Lispro 2 units per sliding scale --> 187 --> 136  - also given Lantus 16 units per prescribed regimen while in triage  - BHB 0.75 > 2L LR > 1.08 > 1L LR > repeat at midnight  - Bicarb 16 > 17> repeat at midnight  - pH 7.41  - continue to trend labs, optimize blood sugars  - MFM aware of admission  - insulin orders per Dr. Flores  - needs scanned and consented    Decreased FM  - , RNST, reassuring with 3 hrs of monitoring  - anterior placenta  - discussed positioning to side to assess FMCs     Contractions  - Q 2-9 mins, abdomen moderate to palpate with contractions  - SVE /-3, unchanged with 2 hr recheck  - urine dip shows SG > 1.030, 3+ ketones, 2+ protein and glucose, trace blood  - wet prep neg, but will treat empirically for symptom relief with Clotrimazole   - urine cx sent  - resolved with IVFs    gHTN, h/o PEC  - normotensive to mild range in triage  - RUQ pain with palpation, but may be r/t contractions  - no other PEC s/s  - HELLP labs neg, P:C pending  - low c/f PEC at this time    Tachycardia  - maternal HR 120s on arrival, now 105  - no chest pain or SOB  - likely from dehydration    Maternal Well-being  - Emotional support and reassurance provided  - All questions and concerns addressed    IUP @ 34w0d  - prenatal lab work reviewed   - Rubella equivocal  - EFW  at 31.1 wga: 1635g (27% with AC 14%)  - plan growth in am, order in    The patient's pregnancy complications have been discussed with the patient in detail.  Admit to inpatient status. I anticipate that this patient will require a stay exceeding 2  midnights.  Active management of the pregnancy complications.     Admit to L&D for blood sugar optimization. Report to Dr. Flores for continuation of care  Staffed with Dr. Isaiah Rivera, APRN-CNP      Active Problems:  There are no active Hospital Problems.      Pregnancy Problems (from 10/19/23 to present)       Problem Noted Resolved    History of postpartum hemorrhage, currently pregnant 1/31/2024 by Renetta Telles MD No    Priority:  Medium      History of shoulder dystocia in prior pregnancy, currently pregnant 1/31/2024 by Renetta Telles MD No    Priority:  Medium      Overview Addendum 2/1/2024  1:55 PM by Renetta Telles MD     2021, 36.0wks, sPEC, A1DM   No residual deficits in that child, no shoulder in x2 35w deliveries (P1 and P3)         Insulin controlled gestational diabetes mellitus (GDM) in third trimester 1/26/2024 by Renetta Baldwin RN No    Priority:  Medium      Overview Addendum 2/8/2024  1:27 PM by Abdon Rothman MD     By 1-hour 244    [x] MFM Consult/education  [ ] Serial growth ultrasounds   2/1 Growth US: EFW 1635g 27%, AC 11%. AFV normal    Fetal surveillance:  [x] Weekly at 32 weeks  [] Twice weekly at 36 weeks      2/5/2024 Begin NPH 10/16  2/8/2024: Switched to Lantus 10/16         33 weeks gestation of pregnancy 1/16/2024 by Brisa Hardy, APRN-CNP No    Priority:  Medium      Overview Addendum 2/8/2024  1:23 PM by Abdon Rothman MD     Dating:   [x] Initial BMI: 35  [x] Prenatal Labs: reviewed and wnl (11/2)  [x] Aneuploidy Screening: RR First Check- normal quad screen (after corrected for GA)  [x] Baby ASA: compliant  [x] Anatomy US: wnl   [x] 1hr GCT at 24-28wks: 1-hr 244, GDM   [x] Tdap (27-36wks): 2/8/2024   [x] Flu Shot: 9/2023  [x] COVID vaccine:  booster 2/1/24  [x] Rhogam (if Rh neg): Rh+ not indicated   [] GBS at 36 wks:  [x] Breastfeeding: yes, wants to buy hands free pump, declined sending rx  [x] PPBC: s/p BS (was pregnant right before tubal, had negative  "UPT prior)  [] 39 weeks discussion of IOL vs. Expectant management:  [] Mode of delivery:  anticipate vaginal, had prior PPH with first child and Shoulder dystocia with P2, no residual deficits for that child         History of pre-eclampsia in prior pregnancy, currently pregnant 1/16/2024 by Brisa Hardy, PRASANTH-CNP No    Priority:  Medium      Overview Addendum 2/1/2024  1:55 PM by Renetta Telles MD     On Asa ppx         Hypertension affecting pregnancy in third trimester 11/2/2023 by Dylon Song MD No    Priority:  Medium      Overview Addendum 2/21/2024  5:30 PM by Renetta Nelson MD     Not on meds, asa ppx  Rx BP cuff  Home Bps mostly normotensive, some mild range   Baseline PEC labs wnl, 1/16 P:C 0.11  Visited triage for reported SRBPs at home and HA on 2/10/24. HA improved and HELLP labs wnl at that time. Pt left triage prior to completion of workup.  [ ] Serial growth           Pelvic pain affecting pregnancy in second trimester, antepartum 10/19/2023 by Zoila Wolf MD No    Priority:  Medium      Overview Signed 10/19/2023 12:53 PM by Zoila Wolf MD     - s/p ED visits x2, elevated WBC otherwise labs wnl  - CT unable to be completed to r/o appendicitis   - symptoms improved today          Depression affecting pregnancy in third trimester, antepartum 10/18/2023 by Anne Marie Vera No    Priority:  Medium      Overview Addendum 2/1/2024  1:55 PM by Renetta Telles MD     - on seroquel outside of pregnancy, not taking currently  Mood good                Subjective   Traniece is here with decreased FM since this am. She has felt movement, but the movement is not as strong as she is used to. She also reports irregular \"tightening\" since yesterday. She denies vaginal bleeding, loss of fluid, vaginal odor, concern for STIs, but she does report intense vaginal itching. Last intercourse was one week ago.   Of note: pt is GDMA2 and has not been taking her insulin because the injections sting and she does " not like that. She denies N/V and has felt well. Pt also has gHTN- she denies HA, scotoma, chest pain, or SOB, but does report some RUQ pain that she thinks is from contractions.      Obstetrical History   OB History    Para Term  AB Living   5 3 0 3 1 3   SAB IAB Ectopic Multiple Live Births           3      # Outcome Date GA Lbr Marcel/2nd Weight Sex Delivery Anes PTL Lv   5 Current            4  2023 35w0d    Vag-Spont   [facility]      Complications: Preeclampsia, severe, unspecified trimester   3   36w0d    Vag-Spont   [facility]      Complications: Shoulder Dystocia, Preeclampsia, severe, unspecified trimester, Gestational diabetes, diet controlled   2   35w0d    Vag-Spont   [facility]      Complications: Hemorrhage, Preeclampsia,  premature rupture of membranes (PPROM) with unknown onset of labor   1 AB                Past Medical History  Past Medical History:   Diagnosis Date    Degenerative myopia, bilateral 2020    Bilateral degenerative progressive high myopia    Degenerative myopia, bilateral 2020    Pathologic myopia, both eyes    Encounter for insertion of intrauterine contraceptive device 2019    Encounter for insertion of copper IUD    Encounter for pregnancy test, result negative 10/09/2020    Urine pregnancy test negative    Encounter for pregnancy test, result positive 2020    Pregnancy confirmed by positive urine test    Encounter for screening for infections with a predominantly sexual mode of transmission 2020    Routine screening for STI (sexually transmitted infection)    Encounter for screening for malignant neoplasm of cervix 2020    Cervical cancer screening    Encounter for surveillance of contraceptives, unspecified 2019    Encounter for surveillance of contraceptive device    History of gestational diabetes 10/18/2023    For early 1-hr GTT    Lattice degeneration of retina, bilateral 2020    Bilateral retinal  lattice degeneration    Myopia, unspecified eye 09/10/2015    High myopia    Other specified health status     No pertinent past medical history    Personal history of other infectious and parasitic diseases 12/05/2018    History of herpes labialis    Unspecified chorioretinal scars, 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     Medications  Medications Prior to Admission   Medication Sig Dispense Refill Last Dose    alcohol swabs pads, medicated Use 1, up to 5 times a day 200 each 3     aspirin 81 mg chewable tablet CHEW 2 TABLETS BY MOUTH ONCE DAILY 60 tablet 3     blood sugar diagnostic (Blood Glucose Test) strip Use 1 strip 4-6 times a day during pregnancy 150 each 3     blood sugar diagnostic (OneTouch Verio test strips) strip Use 1 strip 4-6 times per day to test blood sugar during pregnancy 150 each 3     blood-glucose meter (Accu-Chek Guide Glucose Meter) misc Use for testing blood sugar in pregnancy 1 each 0     blood-glucose meter misc Use for testing blood sugar fasting and 1 hour after each meal. 4-6 times per day during pregnancy 1 each 0     cyclobenzaprine (Flexeril) 10 mg tablet Take 0.5 tablets (5 mg) by mouth 3 times a day as needed for muscle spasms. 14 tablet 0     insulin glargine (Lantus) 100 unit/mL (3 mL) pen Inject 10 units in the morning and 16 units before bed. 15 mL 3     lancets 33 gauge misc Use 1 lancet 4-6 times per day during pregnancy 200 each 3     lancets 33 gauge misc Use 1 lancet 4-6 times per day during pregnancy  "200 each 3     magnesium oxide 500 mg tablet Take 1 tablet (500 mg) by mouth once daily. 60 tablet 1     metoclopramide (Reglan) 10 mg tablet Take 1 tablet (10 mg) by mouth every 8 hours if needed (headache). 12 tablet 0     miscellaneous medical supply (Blood Pressure Cuff) misc USE TO TEST BLOOD PRESSURE 1 each 0     pen needle, diabetic (Pen Needle) 32 gauge x 5/32\" needle Use 1 per injection, up to 5 times a day 200 each 3     prenatal vitamin, iron-folic, 27 mg iron-800 mcg folic acid tablet TAKE 1 TABLET BY MOUTH ONCE DAILY 30 tablet 4     riboflavin (vitamin B2) 100 mg tablet tablet Take 1 tablet (100 mg) by mouth once daily. 60 tablet 0     SUMAtriptan (Imitrex) 50 mg tablet Take 1 tablet (50 mg) by mouth 1 time if needed for migraine for up to 1 dose. May repeat dose once in 2 hours if no relief.  Do not exceed 2 doses in 24 hours. 1 tablet 0        Objective    Last Vitals  Temp Pulse Resp BP MAP O2 Sat   36 °C (96.8 °F) (!) 122 18 139/89   98 %     Physical Examination  Physical Exam  Constitutional:       Appearance: Normal appearance.   HENT:      Head: Normocephalic and atraumatic.      Mouth/Throat:      Mouth: Mucous membranes are dry.   Cardiovascular:      Rate and Rhythm: Regular rhythm. Tachycardia present.      Heart sounds: Normal heart sounds.   Pulmonary:      Effort: Pulmonary effort is normal.      Breath sounds: Normal breath sounds.   Abdominal:      Palpations: Abdomen is soft.      Tenderness: There is no abdominal tenderness.   Genitourinary:     Comments: Cervical Exam  Dilation: 1  Effacement (%): 50  Fetal Station: -3  Cervical Consistency: Medium  Cervical Position: Middle  Fetal Assessment  Movement: Present  Mode: External US  Baseline Fetal Heart Rate (bpm): 140 bpm  Baseline Classification: Normal  Variability: Moderate (Between 6 and 25 BPM)  Pattern: Accelerations   Fetal Decelerations: No  Contraction Frequency: Q 2-9 mins    Musculoskeletal:         General: Normal range of " motion.      Cervical back: Normal range of motion.   Skin:     General: Skin is warm and dry.   Neurological:      General: No focal deficit present.      Mental Status: She is alert and oriented to person, place, and time.   Psychiatric:         Mood and Affect: Mood normal.         Behavior: Behavior normal.       Lab Review  Admission on 02/29/2024   Component Date Value Ref Range Status    POCT Glucose 02/29/2024 209 (H)  74 - 99 mg/dL Final    POC Color, Urine 02/29/2024 Yellow  Straw, Yellow, Light-Yellow In process    POC Appearance, Urine 02/29/2024 Clear  Clear In process    POC Glucose, Urine 02/29/2024 250 (2+) (A)  NEGATIVE mg/dl In process    POC Bilirubin, Urine 02/29/2024 NEGATIVE  NEGATIVE In process    POC Ketones, Urine 02/29/2024 80 (3+) (A)  NEGATIVE mg/dl In process    POC Specific Gravity, Urine 02/29/2024 >=1.030  1.005 - 1.035 In process    POC Blood, Urine 02/29/2024 TRACE-Lysed (A)  NEGATIVE In process    POC PH, Urine 02/29/2024 5.5  No Reference Range Established PH In process    POC Protein, Urine 02/29/2024 100 (2+) (A)  NEGATIVE, 30 (1+) mg/dl In process    POC Urobilinogen, Urine 02/29/2024 0.2  0.2, 1.0 EU/DL In process    Poc Nitrite, Urine 02/29/2024 NEGATIVE  NEGATIVE In process    POC Leukocytes, Urine 02/29/2024 NEGATIVE  NEGATIVE In process    POCT pH, Venous 02/29/2024 7.41  7.33 - 7.43 pH Final    POCT pCO2, Venous 02/29/2024 28 (L)  41 - 51 mm Hg Final    POCT pO2, Venous 02/29/2024 63 (H)  35 - 45 mm Hg Final    POCT SO2, Venous 02/29/2024 93 (H)  45 - 75 % Final    POCT Oxy Hemoglobin, Venous 02/29/2024 90.8 (H)  45.0 - 75.0 % Final    POCT Base Excess, Venous 02/29/2024 -5.5 (L)  -2.0 - 3.0 mmol/L Final    POCT HCO3 Calculated, Venous 02/29/2024 17.7 (L)  22.0 - 26.0 mmol/L Final    Patient Temperature 02/29/2024 37.0  degrees Celsius Final    FiO2 02/29/2024 21  % Final    Beta-Hydroxybutyrate 02/29/2024 0.75 (H)  0.02 - 0.27 mmol/L Final    LDH 02/29/2024 141  84 - 246  U/L Final    Trichomonas 02/29/2024 None Seen  None Seen Final    Clue Cells 02/29/2024 None Seen  None Seen Final    Yeast 02/29/2024 None Seen  None Seen Final    WBC 02/29/2024 21-50   Final    WBC 02/29/2024 9.1  4.4 - 11.3 x10*3/uL Final    nRBC 02/29/2024 0.0  0.0 - 0.0 /100 WBCs Final    RBC 02/29/2024 4.84  4.00 - 5.20 x10*6/uL Final    Hemoglobin 02/29/2024 12.8  12.0 - 16.0 g/dL Final    Hematocrit 02/29/2024 39.9  36.0 - 46.0 % Final    MCV 02/29/2024 82  80 - 100 fL Final    MCH 02/29/2024 26.4  26.0 - 34.0 pg Final    MCHC 02/29/2024 32.1  32.0 - 36.0 g/dL Final    RDW 02/29/2024 14.4  11.5 - 14.5 % Final    Platelets 02/29/2024 274  150 - 450 x10*3/uL Final    POCT Glucose 02/29/2024 187 (H)  74 - 99 mg/dL Final    Glucose 02/29/2024 200 (H)  74 - 99 mg/dL Final    Sodium 02/29/2024 135 (L)  136 - 145 mmol/L Final    Potassium 02/29/2024 3.8  3.5 - 5.3 mmol/L Final    Chloride 02/29/2024 103  98 - 107 mmol/L Final    Bicarbonate 02/29/2024 16 (L)  21 - 32 mmol/L Final    Anion Gap 02/29/2024 20  10 - 20 mmol/L Final    Urea Nitrogen 02/29/2024 12  6 - 23 mg/dL Final    Creatinine 02/29/2024 0.64  0.50 - 1.05 mg/dL Final    eGFR 02/29/2024 >90  >60 mL/min/1.73m*2 Final    Calculations of estimated GFR are performed using the 2021 CKD-EPI Study Refit equation without the race variable for the IDMS-Traceable creatinine methods.  https://jasn.asnjournals.org/content/early/2021/09/22/ASN.6618694133    Calcium 02/29/2024 9.2  8.6 - 10.6 mg/dL Final    Albumin 02/29/2024 3.7  3.4 - 5.0 g/dL Final    Alkaline Phosphatase 02/29/2024 174 (H)  33 - 110 U/L Final    Total Protein 02/29/2024 6.3 (L)  6.4 - 8.2 g/dL Final    AST 02/29/2024 6 (L)  9 - 39 U/L Final    Bilirubin, Total 02/29/2024 0.3  0.0 - 1.2 mg/dL Final    ALT 02/29/2024 6 (L)  7 - 45 U/L Final    Patients treated with Sulfasalazine may generate falsely decreased results for ALT.    Glucose 02/29/2024 149 (H)  74 - 99 mg/dL Final    Sodium  02/29/2024 136  136 - 145 mmol/L Final    Potassium 02/29/2024 3.8  3.5 - 5.3 mmol/L Final    Chloride 02/29/2024 106  98 - 107 mmol/L Final    Bicarbonate 02/29/2024 17 (L)  21 - 32 mmol/L Final    Anion Gap 02/29/2024 17  10 - 20 mmol/L Final    Urea Nitrogen 02/29/2024 10  6 - 23 mg/dL Final    Creatinine 02/29/2024 0.38 (L)  0.50 - 1.05 mg/dL Final    eGFR 02/29/2024 >90  >60 mL/min/1.73m*2 Final    Calculations of estimated GFR are performed using the 2021 CKD-EPI Study Refit equation without the race variable for the IDMS-Traceable creatinine methods.  https://jasn.asnjournals.org/content/early/2021/09/22/ASN.4032213229    Calcium 02/29/2024 8.7  8.6 - 10.6 mg/dL Final    Albumin 02/29/2024 3.0 (L)  3.4 - 5.0 g/dL Final    Alkaline Phosphatase 02/29/2024 139 (H)  33 - 110 U/L Final    Total Protein 02/29/2024 5.4 (L)  6.4 - 8.2 g/dL Final    AST 02/29/2024 7 (L)  9 - 39 U/L Final    Bilirubin, Total 02/29/2024 0.3  0.0 - 1.2 mg/dL Final    ALT 02/29/2024 4 (L)  7 - 45 U/L Final    Patients treated with Sulfasalazine may generate falsely decreased results for ALT.    Beta-Hydroxybutyrate 02/29/2024 1.08 (H)  0.02 - 0.27 mmol/L Final    POCT Glucose 02/29/2024 136 (H)  74 - 99 mg/dL Final

## 2024-03-01 ENCOUNTER — APPOINTMENT (OUTPATIENT)
Dept: RADIOLOGY | Facility: HOSPITAL | Age: 24
End: 2024-03-01
Payer: MEDICAID

## 2024-03-01 VITALS
TEMPERATURE: 97.3 F | SYSTOLIC BLOOD PRESSURE: 133 MMHG | OXYGEN SATURATION: 97 % | HEIGHT: 65 IN | BODY MASS INDEX: 35.49 KG/M2 | DIASTOLIC BLOOD PRESSURE: 77 MMHG | RESPIRATION RATE: 18 BRPM | WEIGHT: 213 LBS | HEART RATE: 111 BPM

## 2024-03-01 LAB
ABO GROUP (TYPE) IN BLOOD: NORMAL
ALBUMIN SERPL BCP-MCNC: 2.8 G/DL (ref 3.4–5)
ALBUMIN SERPL BCP-MCNC: 3.1 G/DL (ref 3.4–5)
ALBUMIN SERPL BCP-MCNC: 3.2 G/DL (ref 3.4–5)
ALP SERPL-CCNC: 132 U/L (ref 33–110)
ALP SERPL-CCNC: 142 U/L (ref 33–110)
ALP SERPL-CCNC: 156 U/L (ref 33–110)
ALT SERPL W P-5'-P-CCNC: 3 U/L (ref 7–45)
ALT SERPL W P-5'-P-CCNC: 4 U/L (ref 7–45)
ALT SERPL W P-5'-P-CCNC: 5 U/L (ref 7–45)
ANION GAP BLDV CALCULATED.4IONS-SCNC: 11 MMOL/L (ref 10–25)
ANION GAP BLDV CALCULATED.4IONS-SCNC: 12 MMOL/L (ref 10–25)
ANION GAP BLDV CALCULATED.4IONS-SCNC: 14 MMOL/L (ref 10–25)
ANION GAP SERPL CALC-SCNC: 14 MMOL/L (ref 10–20)
ANION GAP SERPL CALC-SCNC: 15 MMOL/L (ref 10–20)
ANION GAP SERPL CALC-SCNC: 16 MMOL/L (ref 10–20)
ANTIBODY SCREEN: NORMAL
AST SERPL W P-5'-P-CCNC: 7 U/L (ref 9–39)
AST SERPL W P-5'-P-CCNC: 8 U/L (ref 9–39)
AST SERPL W P-5'-P-CCNC: 8 U/L (ref 9–39)
B-OH-BUTYR SERPL-SCNC: 0.25 MMOL/L (ref 0.02–0.27)
B-OH-BUTYR SERPL-SCNC: 0.45 MMOL/L (ref 0.02–0.27)
B-OH-BUTYR SERPL-SCNC: 0.46 MMOL/L (ref 0.02–0.27)
BACTERIA UR CULT: NORMAL
BASE EXCESS BLDV CALC-SCNC: -4.1 MMOL/L (ref -2–3)
BASE EXCESS BLDV CALC-SCNC: -4.9 MMOL/L (ref -2–3)
BASE EXCESS BLDV CALC-SCNC: -5.1 MMOL/L (ref -2–3)
BILIRUB SERPL-MCNC: 0.3 MG/DL (ref 0–1.2)
BILIRUB SERPL-MCNC: 0.3 MG/DL (ref 0–1.2)
BILIRUB SERPL-MCNC: 0.4 MG/DL (ref 0–1.2)
BODY TEMPERATURE: 37 DEGREES CELSIUS
BUN SERPL-MCNC: 6 MG/DL (ref 6–23)
BUN SERPL-MCNC: 7 MG/DL (ref 6–23)
BUN SERPL-MCNC: 8 MG/DL (ref 6–23)
CA-I BLDV-SCNC: 1.14 MMOL/L (ref 1.1–1.33)
CA-I BLDV-SCNC: 1.18 MMOL/L (ref 1.1–1.33)
CA-I BLDV-SCNC: 1.21 MMOL/L (ref 1.1–1.33)
CALCIUM SERPL-MCNC: 8.3 MG/DL (ref 8.6–10.6)
CALCIUM SERPL-MCNC: 8.7 MG/DL (ref 8.6–10.6)
CALCIUM SERPL-MCNC: 8.8 MG/DL (ref 8.6–10.6)
CHLORIDE BLDV-SCNC: 105 MMOL/L (ref 98–107)
CHLORIDE BLDV-SCNC: 105 MMOL/L (ref 98–107)
CHLORIDE BLDV-SCNC: 106 MMOL/L (ref 98–107)
CHLORIDE SERPL-SCNC: 106 MMOL/L (ref 98–107)
CHLORIDE SERPL-SCNC: 106 MMOL/L (ref 98–107)
CHLORIDE SERPL-SCNC: 108 MMOL/L (ref 98–107)
CO2 SERPL-SCNC: 17 MMOL/L (ref 21–32)
CO2 SERPL-SCNC: 18 MMOL/L (ref 21–32)
CO2 SERPL-SCNC: 18 MMOL/L (ref 21–32)
CREAT SERPL-MCNC: 0.35 MG/DL (ref 0.5–1.05)
CREAT SERPL-MCNC: 0.36 MG/DL (ref 0.5–1.05)
CREAT SERPL-MCNC: 0.42 MG/DL (ref 0.5–1.05)
EGFRCR SERPLBLD CKD-EPI 2021: >90 ML/MIN/1.73M*2
GLUCOSE BLD MANUAL STRIP-MCNC: 111 MG/DL (ref 74–99)
GLUCOSE BLD MANUAL STRIP-MCNC: 119 MG/DL (ref 74–99)
GLUCOSE BLD MANUAL STRIP-MCNC: 123 MG/DL (ref 74–99)
GLUCOSE BLD MANUAL STRIP-MCNC: 126 MG/DL (ref 74–99)
GLUCOSE BLD MANUAL STRIP-MCNC: 161 MG/DL (ref 74–99)
GLUCOSE BLD MANUAL STRIP-MCNC: 164 MG/DL (ref 74–99)
GLUCOSE BLD MANUAL STRIP-MCNC: 165 MG/DL (ref 74–99)
GLUCOSE BLD MANUAL STRIP-MCNC: 212 MG/DL (ref 74–99)
GLUCOSE BLDV-MCNC: 122 MG/DL (ref 74–99)
GLUCOSE BLDV-MCNC: 142 MG/DL (ref 74–99)
GLUCOSE BLDV-MCNC: 209 MG/DL (ref 74–99)
GLUCOSE SERPL-MCNC: 115 MG/DL (ref 74–99)
GLUCOSE SERPL-MCNC: 133 MG/DL (ref 74–99)
GLUCOSE SERPL-MCNC: 196 MG/DL (ref 74–99)
HCO3 BLDV-SCNC: 17.9 MMOL/L (ref 22–26)
HCO3 BLDV-SCNC: 18.2 MMOL/L (ref 22–26)
HCO3 BLDV-SCNC: 19.6 MMOL/L (ref 22–26)
HCT VFR BLD EST: 34 % (ref 36–46)
HCT VFR BLD EST: 38 % (ref 36–46)
HCT VFR BLD EST: 41 % (ref 36–46)
HGB BLDV-MCNC: 11.4 G/DL (ref 12–16)
HGB BLDV-MCNC: 12.8 G/DL (ref 12–16)
HGB BLDV-MCNC: 13.7 G/DL (ref 12–16)
INHALED O2 CONCENTRATION: 21 %
LACTATE BLDV-SCNC: 1.5 MMOL/L (ref 0.4–2)
LACTATE BLDV-SCNC: 1.8 MMOL/L (ref 0.4–2)
LACTATE BLDV-SCNC: 1.9 MMOL/L (ref 0.4–2)
LACTATE BLDV-SCNC: 2.1 MMOL/L (ref 0.4–2)
OXYHGB MFR BLDV: 61.4 % (ref 45–75)
OXYHGB MFR BLDV: 94.8 % (ref 45–75)
OXYHGB MFR BLDV: 96.2 % (ref 45–75)
PCO2 BLDV: 27 MM HG (ref 41–51)
PCO2 BLDV: 28 MM HG (ref 41–51)
PCO2 BLDV: 31 MM HG (ref 41–51)
PH BLDV: 7.41 PH (ref 7.33–7.43)
PH BLDV: 7.42 PH (ref 7.33–7.43)
PH BLDV: 7.43 PH (ref 7.33–7.43)
PO2 BLDV: 102 MM HG (ref 35–45)
PO2 BLDV: 42 MM HG (ref 35–45)
PO2 BLDV: 76 MM HG (ref 35–45)
POTASSIUM BLDV-SCNC: 3.7 MMOL/L (ref 3.5–5.3)
POTASSIUM BLDV-SCNC: 3.7 MMOL/L (ref 3.5–5.3)
POTASSIUM BLDV-SCNC: 3.8 MMOL/L (ref 3.5–5.3)
POTASSIUM SERPL-SCNC: 3.8 MMOL/L (ref 3.5–5.3)
POTASSIUM SERPL-SCNC: 3.9 MMOL/L (ref 3.5–5.3)
POTASSIUM SERPL-SCNC: 3.9 MMOL/L (ref 3.5–5.3)
PROT SERPL-MCNC: 5 G/DL (ref 6.4–8.2)
PROT SERPL-MCNC: 5.5 G/DL (ref 6.4–8.2)
PROT SERPL-MCNC: 5.6 G/DL (ref 6.4–8.2)
RH FACTOR (ANTIGEN D): NORMAL
SAO2 % BLDV: 63 % (ref 45–75)
SAO2 % BLDV: 97 % (ref 45–75)
SAO2 % BLDV: 98 % (ref 45–75)
SODIUM BLDV-SCNC: 131 MMOL/L (ref 136–145)
SODIUM BLDV-SCNC: 133 MMOL/L (ref 136–145)
SODIUM BLDV-SCNC: 133 MMOL/L (ref 136–145)
SODIUM SERPL-SCNC: 133 MMOL/L (ref 136–145)
SODIUM SERPL-SCNC: 135 MMOL/L (ref 136–145)
SODIUM SERPL-SCNC: 138 MMOL/L (ref 136–145)

## 2024-03-01 PROCEDURE — G0378 HOSPITAL OBSERVATION PER HR: HCPCS

## 2024-03-01 PROCEDURE — 36415 COLL VENOUS BLD VENIPUNCTURE: CPT | Performed by: STUDENT IN AN ORGANIZED HEALTH CARE EDUCATION/TRAINING PROGRAM

## 2024-03-01 PROCEDURE — 99222 1ST HOSP IP/OBS MODERATE 55: CPT | Performed by: OBSTETRICS & GYNECOLOGY

## 2024-03-01 PROCEDURE — 76818 FETAL BIOPHYS PROFILE W/NST: CPT

## 2024-03-01 PROCEDURE — 84132 ASSAY OF SERUM POTASSIUM: CPT | Performed by: STUDENT IN AN ORGANIZED HEALTH CARE EDUCATION/TRAINING PROGRAM

## 2024-03-01 PROCEDURE — 36415 COLL VENOUS BLD VENIPUNCTURE: CPT

## 2024-03-01 PROCEDURE — 59050 FETAL MONITOR W/REPORT: CPT

## 2024-03-01 PROCEDURE — 2500000002 HC RX 250 W HCPCS SELF ADMINISTERED DRUGS (ALT 637 FOR MEDICARE OP, ALT 636 FOR OP/ED): Performed by: STUDENT IN AN ORGANIZED HEALTH CARE EDUCATION/TRAINING PROGRAM

## 2024-03-01 PROCEDURE — 76816 OB US FOLLOW-UP PER FETUS: CPT

## 2024-03-01 PROCEDURE — 82010 KETONE BODYS QUAN: CPT | Performed by: STUDENT IN AN ORGANIZED HEALTH CARE EDUCATION/TRAINING PROGRAM

## 2024-03-01 PROCEDURE — 96361 HYDRATE IV INFUSION ADD-ON: CPT

## 2024-03-01 PROCEDURE — 2500000004 HC RX 250 GENERAL PHARMACY W/ HCPCS (ALT 636 FOR OP/ED): Performed by: STUDENT IN AN ORGANIZED HEALTH CARE EDUCATION/TRAINING PROGRAM

## 2024-03-01 PROCEDURE — 99199 UNLISTED SPECIAL SVC PX/RPRT: CPT

## 2024-03-01 PROCEDURE — 2500000001 HC RX 250 WO HCPCS SELF ADMINISTERED DRUGS (ALT 637 FOR MEDICARE OP): Performed by: NURSE PRACTITIONER

## 2024-03-01 PROCEDURE — 82947 ASSAY GLUCOSE BLOOD QUANT: CPT | Mod: 91

## 2024-03-01 PROCEDURE — 83605 ASSAY OF LACTIC ACID: CPT | Performed by: STUDENT IN AN ORGANIZED HEALTH CARE EDUCATION/TRAINING PROGRAM

## 2024-03-01 PROCEDURE — 99215 OFFICE O/P EST HI 40 MIN: CPT | Mod: 25

## 2024-03-01 RX ORDER — DEXTROSE MONOHYDRATE 100 MG/ML
100 INJECTION, SOLUTION INTRAVENOUS AS NEEDED
Status: DISCONTINUED | OUTPATIENT
Start: 2024-03-01 | End: 2024-03-01 | Stop reason: HOSPADM

## 2024-03-01 RX ORDER — INSULIN GLARGINE 100 [IU]/ML
INJECTION, SOLUTION SUBCUTANEOUS
Qty: 15 ML | Refills: 3 | Status: SHIPPED | OUTPATIENT
Start: 2024-03-01 | End: 2024-03-18 | Stop reason: HOSPADM

## 2024-03-01 RX ORDER — INSULIN GLARGINE 100 [IU]/ML
8 INJECTION, SOLUTION SUBCUTANEOUS EVERY 24 HOURS
Status: DISCONTINUED | OUTPATIENT
Start: 2024-03-01 | End: 2024-03-01 | Stop reason: HOSPADM

## 2024-03-01 RX ORDER — SODIUM CHLORIDE 9 MG/ML
25 INJECTION, SOLUTION INTRAVENOUS CONTINUOUS PRN
Status: DISCONTINUED | OUTPATIENT
Start: 2024-03-01 | End: 2024-03-01 | Stop reason: HOSPADM

## 2024-03-01 RX ORDER — ACETAMINOPHEN 325 MG/1
975 TABLET ORAL ONCE
Status: COMPLETED | OUTPATIENT
Start: 2024-03-01 | End: 2024-03-01

## 2024-03-01 RX ORDER — DEXTROSE MONOHYDRATE 100 MG/ML
50 INJECTION, SOLUTION INTRAVENOUS CONTINUOUS PRN
Status: DISCONTINUED | OUTPATIENT
Start: 2024-03-01 | End: 2024-03-01 | Stop reason: HOSPADM

## 2024-03-01 RX ORDER — INSULIN LISPRO 100 [IU]/ML
0-10 INJECTION, SOLUTION INTRAVENOUS; SUBCUTANEOUS
Status: DISCONTINUED | OUTPATIENT
Start: 2024-03-01 | End: 2024-03-01

## 2024-03-01 RX ADMIN — ACETAMINOPHEN 975 MG: 325 TABLET ORAL at 09:22

## 2024-03-01 RX ADMIN — INSULIN LISPRO 2 UNITS: 100 INJECTION, SOLUTION INTRAVENOUS; SUBCUTANEOUS at 01:23

## 2024-03-01 RX ADMIN — INSULIN HUMAN 1 UNITS/HR: 1 INJECTION, SOLUTION INTRAVENOUS at 07:01

## 2024-03-01 RX ADMIN — SODIUM CHLORIDE 25 ML/HR: 9 INJECTION, SOLUTION INTRAVENOUS at 06:10

## 2024-03-01 RX ADMIN — INSULIN HUMAN 0.5 UNITS/HR: 1 INJECTION, SOLUTION INTRAVENOUS at 06:10

## 2024-03-01 RX ADMIN — INSULIN LISPRO 4 UNITS: 100 INJECTION, SOLUTION INTRAVENOUS; SUBCUTANEOUS at 05:24

## 2024-03-01 RX ADMIN — INSULIN GLARGINE 8 UNITS: 100 INJECTION, SOLUTION SUBCUTANEOUS at 11:10

## 2024-03-01 RX ADMIN — DEXTROSE MONOHYDRATE 50 ML/HR: 100 INJECTION, SOLUTION INTRAVENOUS at 09:02

## 2024-03-01 RX ADMIN — CLOTRIMAZOLE 1 APPLICATOR: 1 CREAM VAGINAL at 01:22

## 2024-03-01 ASSESSMENT — PAIN SCALES - GENERAL
PAINLEVEL_OUTOF10: 6
PAINLEVEL_OUTOF10: 5 - MODERATE PAIN

## 2024-03-01 ASSESSMENT — PAIN - FUNCTIONAL ASSESSMENT
PAIN_FUNCTIONAL_ASSESSMENT: 0-10
PAIN_FUNCTIONAL_ASSESSMENT: 0-10

## 2024-03-01 NOTE — HOSPITAL COURSE
24yo  at 34.1 wga by 8 wk scan intially presenting to OB triage for decreased fetal movement, admitted for ketosis in setting of GDMA2.     On , the patient presented to OB triage with c/o decreased fetal movement. On presentation, POCT BG found to be 209 with bicarb of 16, anion gap of 16 and BHB of 0.75, therefore she was admitted for management of ketosis in setting of GDMA2. Patient previously recommended to take Lantus 10/16, however patient reports not taking given fear of needles. On admit, she was given nighttime dose of Lantus 16u with additional SSI and several liter boluses of LR, but her blood sugars remained in the 200s with persistent lab abnormalities, therefore she was then started on an insulin gtt. The patient then voiced a desire for discharge and reported she would make a plan to have her FOB administer insulin to her. At that time, all her labs were wnl, therefore the insulin gtt was eventually discontinued and the patient was discharged home with the recommendation to administer 34 units of Lantus at bedtime. Formal growth ultrasound completed at bedside with final read pending. [facility] fuv scheduled for 3/7 with Dr. Rothman.

## 2024-03-01 NOTE — CARE PLAN
The patient's goals for the shift include Control blood sugar    The clinical goals for the shift include   Problem: Diabetes  Goal: Achieve decreasing blood glucose levels by end of shift  Outcome: Met  Goal: Maintain electrolyte levels within acceptable range throughout shift  Outcome: Met       Patient okay to be discharged per team of MD's. Patient given discharge education by RN.     
no

## 2024-03-01 NOTE — SIGNIFICANT EVENT
Resting comfortably in bed. Now reporting robust fetal movement after eating a sandwich + drinking juice.     FHT baseline 140s, mod sheldon, + accels, - decels   [place] quiet    BSUS: cephalic presentation, EFW 2530g PAT 12.1    GDMA2, ketosis r/o DKA   - current regimen Lantus 10/16, however patient not taking, does not like giving herself shots   - denies n/v, tolerating PO diet well as home, no recent illness   - initial glucose 209 > 2u lispro, given 16u lantus > 187 > 136, will continue to check q4hrs overnight   - Lab trend:   - BHB  0.75 > 2L LR > 1.08 > 1L LR > repeat labs ordered now   - Bicarb 16 > 17>   - AG 16 > 13 >   - pH 7.41 >   - admitted to r/o DKA and optimize blood sugars    Decreased FM  - CEFM while on L&D, cat 1 currently  - BSUS growth as above, for formal growth in AM  - PAT 12.1   - now with good movement again    cHTN  - no meds  - normotensive  - HELLP labs wnl     Dispo: continue to monitor on L&D     Cherry Flores MD PGY-4  Obstetrics and Gynecology

## 2024-03-01 NOTE — SIGNIFICANT EVENT
Most recent lab trend reviewed:       BHB 0.75 > 2L LR > 1.08 > 1L LR > 0.45 > 0.46   Bicarb 16 > 17> 17 > 18   AG 16 > 13 > 10   pH 7.41-7.43     Patient continues to report feeling well, denies nausea/vomiting/sick symptoms. Since admission, patient received nighttime dose of lantus 16u and had q2hr BG checks with SSI. Has had several small snacks throughout the night, no actual meals. Received 10u lispro total for correction. Blood sugars most recently elevated into 190-200s despite treatment with lispro sliding scale, BHB remains elevated despite PO intake + aggressive fluid resuscitation, and bicarb remains <20. Discussed with patient recommendation to transition to insulin drip. Plan for hourly POCT glucose checks. Will repeat BHB, VBG, and CMP in 4 hrs.     D/w Dr. Jonatan Flores MD PGY-4  Obstetrics and Gynecology

## 2024-03-01 NOTE — CONSULTS
I saw and evaluated the patient. I personally obtained the key and critical portions of the history and physical exam or was physically present for key and critical portions performed by the resident/fellow. I reviewed the resident/fellow's documentation and discussed the patient with the resident/fellow. I agree with the resident/fellow's medical decision making as documented in the note.    23y  at 34w1d, admitted for elevated BHB without acidosis secondary to no insulin administration in the setting of insulin-dependent gestational diabetes. After discussion, patient's partner will administer her once daily insulin (had previously been on twice daily but patient has a fear of needles and does not want to administer herself). She prefers nighttime administration so her partner can help, so we will titrate this to 34u at bedtime. We discussed the importance of glycemic control to aid in the prevention of stillbirth. On MFM growth scan, the EFW has jumped from 27th% to >99th% and we discussed that this is reflective of poor glycemic control and is associated with poorer outcomes for the . Patient has a CGM and her values were reviewed on her phone. She is mostly 150-200s (previously on Lantus 10/16). She states her partner will administer her insulin, that she will continue to log her glucose values, and that she can attend twice weekly testing which has already been scheduled. We discussed probable delivery around 36-37w with prior admission for glycemic control before delivery, though these decisions will be made at her future appointments. We will also need to discuss mode of delivery as she has a prior history of shoulder dystocia.    Barb Belcher MD  MFM Attending      MFM Consult    Reason for Consult: Poorly Controlled GDM, on insulin GTT r/o DKA    HPI:     Patient initially presented for decreased fetal movement. Fetal movement has improved but the overall strength of movement is not as she  is used to. Patient denies contractions, LOF or vaginal bleeding currently. Patient is A2GDM and has not been taking her insulin due to fear of needles and previous insurance reasons. Patient currently has a mild headache after initiation of insulin gtt but denies RUQ, visual disturbances, or LE swelling. Denies chest pain or SOB.    When asked if patient would ever take insulin in pregnancy, after discussion of the importance, patient states that her partner can be able to inject the insulin for her.        Pregnancy Problems (from 10/19/23 to present)       Problem Noted Resolved    Labor and delivery indication for care or intervention 2/29/2024 by Cherry Flores MD No    Priority:  Medium      History of postpartum hemorrhage, currently pregnant 1/31/2024 by Renetta Telles MD No    Priority:  Medium      History of shoulder dystocia in prior pregnancy, currently pregnant 1/31/2024 by Renetta Telles MD No    Priority:  Medium      Overview Addendum 2/1/2024  1:55 PM by Renetta eTlles MD     2021, 36.0wks, sPEC, A1DM   No residual deficits in that child, no shoulder in x2 35w deliveries (P1 and P3)         Insulin controlled gestational diabetes mellitus (GDM) in third trimester 1/26/2024 by Renetta Baldwin RN No    Priority:  Medium      Overview Addendum 2/8/2024  1:27 PM by Abdon Rothman MD     By 1-hour 244    [x] M Consult/education  [ ] Serial growth ultrasounds   2/1 Growth US: EFW 1635g 27%, AC 11%. AFV normal    Fetal surveillance:  [x] Weekly at 32 weeks  [] Twice weekly at 36 weeks      2/5/2024 Begin NPH 10/16  2/8/2024: Switched to Lantus 10/16         33 weeks gestation of pregnancy 1/16/2024 by Brisa Hardy, PRASANTH-CNP No    Priority:  Medium      Overview Addendum 2/8/2024  1:23 PM by Abdon Rothman MD     Dating:   [x] Initial BMI: 35  [x] Prenatal Labs: reviewed and wnl (11/2)  [x] Aneuploidy Screening: RR First Check- normal quad screen (after corrected for GA)  [x] Baby ASA:  compliant  [x] Anatomy US: wnl   [x] 1hr GCT at 24-28wks: 1-hr 244, GDM   [x] Tdap (27-36wks): 2024   [x] Flu Shot: 2023  [x] COVID vaccine:  booster 24  [x] Rhogam (if Rh neg): Rh+ not indicated   [] GBS at 36 wks:  [x] Breastfeeding: yes, wants to buy hands free pump, declined sending rx  [x] PPBC: s/p BS (was pregnant right before tubal, had negative UPT prior)  [] 39 weeks discussion of IOL vs. Expectant management:  [] Mode of delivery:  anticipate vaginal, had prior PPH with first child and Shoulder dystocia with P2, no residual deficits for that child         History of pre-eclampsia in prior pregnancy, currently pregnant 2024 by Brisa Hardy, APRN-CNP No    Priority:  Medium      Overview Addendum 2024  1:55 PM by Renetta Telles MD     On Asa ppx         Hypertension affecting pregnancy in third trimester 2023 by Dylon Song MD No    Priority:  Medium      Overview Addendum 2024  5:30 PM by Renetta Nelson MD     Not on meds, asa ppx  Rx BP cuff  Home Bps mostly normotensive, some mild range   Baseline PEC labs wnl,  P:C 0.11  Visited triage for reported SRBPs at home and HA on 2/10/24. HA improved and HELLP labs wnl at that time. Pt left triage prior to completion of workup.  [ ] Serial growth           Pelvic pain affecting pregnancy in second trimester, antepartum 10/19/2023 by Zoila Wolf MD No    Priority:  Medium      Overview Signed 10/19/2023 12:53 PM by Zoila oWlf MD     - s/p ED visits x2, elevated WBC otherwise labs wnl  - CT unable to be completed to r/o appendicitis   - symptoms improved today          Depression affecting pregnancy in third trimester, antepartum 10/18/2023 by Anne Marie Vera No    Priority:  Medium      Overview Addendum 2024  1:55 PM by Renetta Telles MD     - on seroquel outside of pregnancy, not taking currently  Mood good                      Obstetrical History   OB History          5    Para   3    Term   0        3    AB   1    Living   3         SAB        IAB        Ectopic        Multiple        Live Births   3                 Past Medical History  Past Medical History:   Diagnosis Date    Degenerative myopia, bilateral 2020    Bilateral degenerative progressive high myopia    Degenerative myopia, bilateral 2020    Pathologic myopia, both eyes    Encounter for insertion of intrauterine contraceptive device 2019    Encounter for insertion of copper IUD    Encounter for pregnancy test, result negative 10/09/2020    Urine pregnancy test negative    Encounter for pregnancy test, result positive 2020    Pregnancy confirmed by positive urine test    Encounter for screening for infections with a predominantly sexual mode of transmission 2020    Routine screening for STI (sexually transmitted infection)    Encounter for screening for malignant neoplasm of cervix 2020    Cervical cancer screening    Encounter for surveillance of contraceptives, unspecified 2019    Encounter for surveillance of contraceptive device    History of gestational diabetes 10/18/2023    For early 1-hr GTT    Lattice degeneration of retina, 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    Myopia, unspecified eye 09/10/2015    High myopia    Other specified health status     No pertinent past medical history    Personal history of other infectious and parasitic diseases 2018    History of herpes labialis    Unspecified chorioretinal scars, bilateral 2020    Chorioretinal scar, both eyes        Past Surgical History   Past Surgical History:   Procedure Laterality Date    OTHER SURGICAL HISTORY  2017    Surg Results Vision Left Eye Central As Expected       Social History  Social History     Socioeconomic History    Marital status: Single     Spouse name: Not on file    Number of children: Not on file    Years of education: Not on file    Highest education level: Not on  file   Occupational History    Not on file   Tobacco Use    Smoking status: Never     Passive exposure: Never    Smokeless tobacco: Never   Vaping Use    Vaping Use: Never used   Substance and Sexual Activity    Alcohol use: Not Currently    Drug use: Never    Sexual activity: Yes     Partners: Male     Comment: pregnant   Other Topics Concern    Not on file   Social History Narrative    Not on file     Social Determinants of Health     Financial Resource Strain: Not on file   Food Insecurity: Not on file   Transportation Needs: Not on file   Physical Activity: Not on file   Stress: Not on file   Social Connections: Not on file   Intimate Partner Violence: Not At Risk (10/17/2023)    Humiliation, Afraid, Rape, and Kick questionnaire     Fear of Current or Ex-Partner: No     Emotionally Abused: No     Physically Abused: No     Sexually Abused: No       Allergies  No Known Allergies    Medications  Medications Prior to Admission   Medication Sig Dispense Refill Last Dose    alcohol swabs pads, medicated Use 1, up to 5 times a day 200 each 3     aspirin 81 mg chewable tablet CHEW 2 TABLETS BY MOUTH ONCE DAILY 60 tablet 3     blood sugar diagnostic (Blood Glucose Test) strip Use 1 strip 4-6 times a day during pregnancy 150 each 3     blood sugar diagnostic (OneTouch Verio test strips) strip Use 1 strip 4-6 times per day to test blood sugar during pregnancy 150 each 3     blood-glucose meter (Accu-Chek Guide Glucose Meter) misc Use for testing blood sugar in pregnancy 1 each 0     blood-glucose meter misc Use for testing blood sugar fasting and 1 hour after each meal. 4-6 times per day during pregnancy 1 each 0     cyclobenzaprine (Flexeril) 10 mg tablet Take 0.5 tablets (5 mg) by mouth 3 times a day as needed for muscle spasms. 14 tablet 0     insulin glargine (Lantus) 100 unit/mL (3 mL) pen Inject 10 units in the morning and 16 units before bed. 15 mL 3     lancets 33 gauge misc Use 1 lancet 4-6 times per day during  "pregnancy 200 each 3     lancets 33 gauge misc Use 1 lancet 4-6 times per day during pregnancy 200 each 3     magnesium oxide 500 mg tablet Take 1 tablet (500 mg) by mouth once daily. 60 tablet 1     metoclopramide (Reglan) 10 mg tablet Take 1 tablet (10 mg) by mouth every 8 hours if needed (headache). 12 tablet 0     miscellaneous medical supply (Blood Pressure Cuff) misc USE TO TEST BLOOD PRESSURE 1 each 0     pen needle, diabetic (Pen Needle) 32 gauge x 5/32\" needle Use 1 per injection, up to 5 times a day 200 each 3     prenatal vitamin, iron-folic, 27 mg iron-800 mcg folic acid tablet TAKE 1 TABLET BY MOUTH ONCE DAILY 30 tablet 4     riboflavin (vitamin B2) 100 mg tablet tablet Take 1 tablet (100 mg) by mouth once daily. 60 tablet 0     SUMAtriptan (Imitrex) 50 mg tablet Take 1 tablet (50 mg) by mouth 1 time if needed for migraine for up to 1 dose. May repeat dose once in 2 hours if no relief.  Do not exceed 2 doses in 24 hours. 1 tablet 0        OBJECTIVE:   /75   Pulse (!) 116   Temp 36.6 °C (97.9 °F)   Resp 18   Ht 1.651 m (5' 5\")   Wt 96.6 kg (213 lb)   SpO2 98%   BMI 35.45 kg/m²    Temp  Min: 36 °C (96.8 °F)  Max: 36.6 °C (97.9 °F)  Pulse  Min: 97  Max: 133  BP  Min: 102/62  Max: 141/90    Physical exam:  General:  AAOx3, No acute distress  Cardiovascular: Warm and well perfused  Respiratory: Normal respiratory effort   Abdominal:  Soft, gravid, non-tender, no rebound or guarding, no palpable contractions   Back: No CVA tenderness  Extremities: Warm, well perfused, no leg edema, no calf tenderness   Pelvic: Deferred    Fetal Heart Monitoring: Baseline 130, accelerations present, no decelerations, moderate variability, small periods of minimal, overall tracing re-assuring  [place]: quiet     Labs:   Lab Results   Component Value Date    ABO O 02/29/2024    LABRH POS 02/29/2024    ABSCRN NEG 02/29/2024     Lab Results   Component Value Date    WBC 9.1 02/29/2024    HGB 12.8 02/29/2024    HCT 39.9 " 2024     2024     Lab Results   Component Value Date    GLUCOSE 115 (H) 2024     (L) 2024    K 3.9 2024     2024    CO2 18 (L) 2024    ANIONGAP 15 2024    BUN 7 2024    CREATININE 0.36 (L) 2024    EGFR >90 2024    CALCIUM 8.7 2024    ALBUMIN 3.2 (L) 2024    PROT 5.5 (L) 2024    ALKPHOS 142 (H) 2024    ALT 4 (L) 2024    AST 8 (L) 2024    BILITOT 0.4 2024       ASSESSMENT AND PLAN:     23 y.o.  at 34w1d by 8 week demetrioo with poorly controlled A2GDM.      GDMA2 r/o DKA  - current regimen:  Lantus 10/16 > switched to 34 units QHS  - not taking insulin   - 3/1/24 3285g >99%, AC >99% PAT 23  - Lab Trends as follows:     BHB 0.75 > 2L LR > 1.08 > 1L LR > 0.45 > 0.46 > 0.25  Bicarb 16 > 17> 17 > 18 > 18  AG 16   pH 7.41-7.43   - POCT 165 > 123 > 212> 161 currently on insulin gtt  - continue to trend labs, optimize blood sugars, consider transitioning off insulin gtt after AG closed and BHB normalized  - discussed the importance of insulin in pregnancy, including risk of  hypoglycemia and possible NICU admission for respiratory distress. Patient aware of the risks as her previous two pregnancies also had NICU admissions. Patient initially refusing to take insulin at all during this pregnancy, however willing to have her partner inject her insulin as she has fear of needles.  - patient amenable to taking insulin after long discussion, patient will be uptitrated to 34 units of lantus at night time for ease of administration, plans to have boyfriend administer medications.  - recommend discontinuation of insulin gtt in one hour, with transition to subQ lantus 8 units, with outpatient plan of 34 units at bedtime at home.  - plan for 2x weekly fetal monitoring, discussion of mode of delivery at next visit. Plan for delivery around 36-37 weeks given increased growth and poorly controlled  diabetes. If patient continues to refuse insulin therapy at home, consider early delivery with hospital admission 48 hours prior for glucose optimization.     Decreased FM  - , RNST, reassuring with 3 hrs of monitoring  - anterior placenta  - feels movement has increased since admission      Contractions  - previously Q2-3 minutes, now quiet toco, resolved with fluids  - SVE /-3, unchanged with 2 hr recheck  - urine dip shows SG > 1.030, 3+ ketones, 2+ protein and glucose, trace blood  - wet prep neg, empirically being treated for symptom relief with Clotrimazole   - urine cx sent     gHTN, r/o PEC  - normotensive to mild range, patient asx  - HELLP labs neg, P:C 0.52  - continue to monitor blood pressures closely     Tachycardia  - maternal HR 120s on arrival, currently 110s  - no chest pain or SOB  - likely from dehydration, EKG ordered     Fetal status:     - continue continuous fetal monitoring, while on LnD  - Growth US this AM    Routine:  - GBS neg on 24  - BCM: s/p BTL on 23    Dispo:   Recommendations as above, Plan for discharge this PM  Pt seen and discussed with MFM Attending, .     Slim Gray MD  Fellow, Maternal Fetal Medicine

## 2024-03-01 NOTE — PROGRESS NOTES
Antepartum Progress Note    Assessment/Plan   22yo  at 34.1 wga by 8 wk scan intially presenting to OB triage for decreased fetal movement, admitted for ketosis in setting of GDMA2    GDMA2, ketosis r/o DKA   - current regimen Lantus 10/16, however patient not taking, as she does not like giving herself shots   - Initial BG on presentation 209 with low bicarb and elevated BHB  - S/p nighttime dose of lantus 16u and q2hr BG checks with 10u SSI with snacks. Despite treatment with lispro sliding scale, BGs remained in high 100s-200s, BHB remained elevated despite PO intake + aggressive fluid resuscitation, and bicarb remained <20, therefore decision was made to start insulin gtt   - Lab trend:   - BHB  0.75 > 2L LR > 1.08 > 1L LR  > 0.45 > 0.46 > 0.25  - Bicarb 16 > 17> 17 > 18 > 18  - AG 16 > 13 > 10 > 11 > 12  - pH 7.41 - 7.43  - Most recent , insulin gtt currently at 1.7  - Patient initially refusing to take insulin given fear of needles, but now reports willing to allow partner to do injections   - MFM following    cHTN  - no meds  - normotensive to mild range   - HELLP labs wnl   - Now reporting HA. S/p Tylenol. Will follow up if resolved     Fetal Status  - cEFM, Cat 1   - Repeat growth scan completed at bedside, pending final report     Dispo: to continue management on L&D for glycemic control     D/w Dr. Parul Santos MD, PGY-2    Subjective   Now on insulin gtt. Patient reporting headache that started when drip was started. Also reporting nausea, denies vomiting and all other sick symptoms. No other complaints at this time.     Objective   Allergies:   Patient has no known allergies.    Last Vitals:  Temp Pulse Resp BP MAP Pulse Ox   36.6 °C (97.9 °F) (!) 117 18 (!) 140/84   100 %     Vitals Min/Max Last 24 Hours:  Temp  Min: 36 °C (96.8 °F)  Max: 36.6 °C (97.9 °F)  Pulse  Min: 97  Max: 133  Resp  Min: 18  Max: 18  BP  Min: 102/62  Max: 141/90    Intake/Output:     Intake/Output Summary  (Last 24 hours) at 3/1/2024 0918  Last data filed at 3/1/2024 0700  Gross per 24 hour   Intake 27.39 ml   Output --   Net 27.39 ml       Physical Exam:  Constitutional: no visible distress, alert and cooperative  Eyes: clear sclera  Head/Neck: normocephalic  Respiratory/Thorax: normal respiratory effort on RA  Cardiovascular: regular rate   Gastrointestinal: abdomen soft, gravid, non-tender to palpation, without rebound or guarding  Musculoskeletal: grossly normal ROM  Neurological: no gross deficits appreciated   Psychological: Appropriate mood and behavior  Skin: warm, dry, no lesions     FHR: 140/mod/+accel/-decel   [place]: no ctxs     Lab Data:  Lab Results   Component Value Date    GLUCOSE 133 (H) 03/01/2024    CALCIUM 8.8 03/01/2024     03/01/2024    K 3.9 03/01/2024    CO2 18 (L) 03/01/2024     (H) 03/01/2024    BUN 6 03/01/2024    CREATININE 0.42 (L) 03/01/2024

## 2024-03-01 NOTE — DISCHARGE SUMMARY
Discharge Summary    Admission Date: 2024  Discharge Date: 24    Discharge Diagnosis  Labor and delivery indication for care or intervention    Hospital Course  Patient initially presented for decreased fetal movement. Fetal movement has improved but the overall strength of movement is not as she is used to. Patient denies contractions, LOF or vaginal bleeding currently. Patient is A2GDM and has not been taking her insulin due to fear of needles and previous insurance reasons. Patient currently has a mild headache after initiation of insulin gtt but denies RUQ, visual disturbances, or LE swelling. Denies chest pain or SOB.     Patient initially refused to take insulin after fear of needles. Discussed the importance of insulin in pregnancy, including risk of  hypoglycemia and possible NICU admission for respiratory distress. Patient aware of the risks as her previous two pregnancies also had NICU admissions. Patient initially refusing to take insulin at all during this pregnancy, however willing to have her partner inject her insulin.  - patient amenable to taking insulin after long discussion, patient will be uptitrated to 34 units of lantus at night time for ease of administration, plans to have boyfriend administer medications.  - plan for 2x weekly fetal monitoring, discussion of mode of delivery at next visit. Plan for delivery around 36-37 weeks given increased growth and poorly controlled diabetes. If patient continues to refuse insulin therapy at home, consider early delivery with hospital admission 48 hours prior for glucose optimization.     3/1/24 3285g >99%, AC >99% PAT 23     Discharge Meds     Your medication list        CHANGE how you take these medications        Instructions Last Dose Given Next Dose Due   insulin glargine 100 unit/mL (3 mL) pen  Commonly known as: Lantus  What changed: additional instructions      Inject 34 units before bed.              CONTINUE taking these  "medications        Instructions Last Dose Given Next Dose Due   alcohol swabs pads, medicated      Use 1, up to 5 times a day       aspirin 81 mg chewable tablet      CHEW 2 TABLETS BY MOUTH ONCE DAILY       Blood Pressure Cuff misc  Generic drug: miscellaneous medical supply      USE TO TEST BLOOD PRESSURE       blood-glucose meter misc  Commonly known as: Accu-Chek Guide Glucose Meter      Use for testing blood sugar in pregnancy       OneTouch Verio Flex meter misc  Generic drug: blood-glucose meter      Use for testing blood sugar fasting and 1 hour after each meal. 4-6 times per day during pregnancy       magnesium oxide 500 mg magnesium tablet      Take 1 tablet (500 mg) by mouth once daily.       metoclopramide 10 mg tablet  Commonly known as: Reglan      Take 1 tablet (10 mg) by mouth every 8 hours if needed (headache).       OneTouch Delica Plus Lancet 33 gauge misc  Generic drug: lancets      Use 1 lancet 4-6 times per day during pregnancy       lancets 33 gauge misc      Use 1 lancet 4-6 times per day during pregnancy       OneTouch Verio test strips strip  Generic drug: blood sugar diagnostic      Use 1 strip 4-6 times per day to test blood sugar during pregnancy       Blood Glucose Test strip  Generic drug: blood sugar diagnostic      Use 1 strip 4-6 times a day during pregnancy       Prenatal Vitamin 27 mg iron-800 mcg folic acid tablet  Generic drug: prenatal vitamin (iron-folic)      TAKE 1 TABLET BY MOUTH ONCE DAILY       SUMAtriptan 50 mg tablet  Commonly known as: Imitrex      Take 1 tablet (50 mg) by mouth 1 time if needed for migraine for up to 1 dose. May repeat dose once in 2 hours if no relief.  Do not exceed 2 doses in 24 hours.       TechLITE Pen Needle 32 gauge x 5/32\" needle  Generic drug: pen needle, diabetic      Use 1 per injection, up to 5 times a day       Vitamin B-2 100 mg tablet tablet  Generic drug: riboflavin      Take 1 tablet (100 mg) by mouth once daily.              STOP " taking these medications      cyclobenzaprine 10 mg tablet  Commonly known as: Flexeril                  Where to Get Your Medications        These medications were [facility] [place] [facility]  [address] [id number]      Hours: 8:30 AM to 5 PM Mon-Fri Phone: [phone]   insulin glargine 100 unit/mL (3 mL) pen            Test Results Pending At Discharge  Pending Labs       Order Current Status    POCT UA (nonautomated) manually resulted In process            Outpatient Follow-Up  Future Appointments   Date Time Provider Department Center   3[facility]2024  [id number]   3/7/2024 10:00 AM Abdon Rothman MD [id number]       I spent 40 minutes in the professional and overall care of this patient.    Slim Gray MD  Fellow, Maternal Fetal Medicine

## 2024-03-05 PROCEDURE — RXMED WILLOW AMBULATORY MEDICATION CHARGE

## 2024-03-06 PROBLEM — Z3A.35 35 WEEKS GESTATION OF PREGNANCY (HHS-HCC): Status: ACTIVE | Noted: 2024-01-16

## 2024-03-07 ENCOUNTER — ROUTINE PRENATAL (OUTPATIENT)
Dept: MATERNAL FETAL MEDICINE | Facility: CLINIC | Age: 24
End: 2024-03-07
Payer: MEDICAID

## 2024-03-07 ENCOUNTER — HOSPITAL ENCOUNTER (INPATIENT)
Facility: HOSPITAL | Age: 24
LOS: 11 days | Discharge: HOME | End: 2024-03-18
Attending: OBSTETRICS & GYNECOLOGY | Admitting: OBSTETRICS & GYNECOLOGY
Payer: MEDICAID

## 2024-03-07 ENCOUNTER — PROCEDURE VISIT (OUTPATIENT)
Dept: OBSTETRICS AND GYNECOLOGY | Facility: CLINIC | Age: 24
End: 2024-03-07
Payer: MEDICAID

## 2024-03-07 VITALS — SYSTOLIC BLOOD PRESSURE: 136 MMHG | WEIGHT: 223 LBS | DIASTOLIC BLOOD PRESSURE: 90 MMHG | BODY MASS INDEX: 37.11 KG/M2

## 2024-03-07 VITALS
DIASTOLIC BLOOD PRESSURE: 90 MMHG | SYSTOLIC BLOOD PRESSURE: 136 MMHG | HEART RATE: 86 BPM | WEIGHT: 223 LBS | BODY MASS INDEX: 37.11 KG/M2

## 2024-03-07 DIAGNOSIS — O16.3 HYPERTENSION AFFECTING PREGNANCY IN THIRD TRIMESTER (HHS-HCC): Primary | ICD-10-CM

## 2024-03-07 DIAGNOSIS — Z3A.35 35 WEEKS GESTATION OF PREGNANCY (HHS-HCC): ICD-10-CM

## 2024-03-07 DIAGNOSIS — O14.10 SEVERE PRE-ECLAMPSIA, ANTEPARTUM (HHS-HCC): Primary | ICD-10-CM

## 2024-03-07 DIAGNOSIS — O24.414 INSULIN CONTROLLED GESTATIONAL DIABETES MELLITUS (GDM) IN THIRD TRIMESTER (HHS-HCC): ICD-10-CM

## 2024-03-07 DIAGNOSIS — O09.299 HISTORY OF PRE-ECLAMPSIA IN PRIOR PREGNANCY, CURRENTLY PREGNANT (HHS-HCC): ICD-10-CM

## 2024-03-07 DIAGNOSIS — Z86.32 HISTORY OF GESTATIONAL DIABETES: ICD-10-CM

## 2024-03-07 PROBLEM — O24.419 GESTATIONAL DIABETES (HHS-HCC): Status: ACTIVE | Noted: 2024-03-07

## 2024-03-07 PROBLEM — O26.892 PELVIC PAIN AFFECTING PREGNANCY IN SECOND TRIMESTER, ANTEPARTUM (HHS-HCC): Status: RESOLVED | Noted: 2023-10-19 | Resolved: 2024-03-07

## 2024-03-07 PROBLEM — R10.2 PELVIC PAIN AFFECTING PREGNANCY IN SECOND TRIMESTER, ANTEPARTUM (HHS-HCC): Status: RESOLVED | Noted: 2023-10-19 | Resolved: 2024-03-07

## 2024-03-07 LAB
ABO GROUP (TYPE) IN BLOOD: NORMAL
ALBUMIN SERPL BCP-MCNC: 3.2 G/DL (ref 3.4–5)
ALP SERPL-CCNC: 173 U/L (ref 33–110)
ALT SERPL W P-5'-P-CCNC: 4 U/L (ref 7–45)
ANION GAP BLDV CALCULATED.4IONS-SCNC: 15 MMOL/L (ref 10–25)
ANION GAP SERPL CALC-SCNC: 13 MMOL/L (ref 10–20)
ANION GAP SERPL CALC-SCNC: 15 MMOL/L (ref 10–20)
ANTIBODY SCREEN: NORMAL
AST SERPL W P-5'-P-CCNC: 10 U/L (ref 9–39)
B-OH-BUTYR SERPL-SCNC: 0.26 MMOL/L (ref 0.02–0.27)
B-OH-BUTYR SERPL-SCNC: 0.64 MMOL/L (ref 0.02–0.27)
BASE EXCESS BLDV CALC-SCNC: -4.9 MMOL/L (ref -2–3)
BASE EXCESS BLDV CALC-SCNC: -6.4 MMOL/L (ref -2–3)
BILIRUB SERPL-MCNC: 0.2 MG/DL (ref 0–1.2)
BILIRUBIN, POC: NEGATIVE
BLOOD URINE, POC: POSITIVE
BODY TEMPERATURE: 37 DEGREES CELSIUS
BODY TEMPERATURE: 37 DEGREES CELSIUS
BUN SERPL-MCNC: 7 MG/DL (ref 6–23)
BUN SERPL-MCNC: 9 MG/DL (ref 6–23)
CA-I BLDV-SCNC: 1.2 MMOL/L (ref 1.1–1.33)
CALCIUM SERPL-MCNC: 8.8 MG/DL (ref 8.6–10.6)
CALCIUM SERPL-MCNC: 9 MG/DL (ref 8.6–10.6)
CHLORIDE BLDV-SCNC: 105 MMOL/L (ref 98–107)
CHLORIDE SERPL-SCNC: 107 MMOL/L (ref 98–107)
CHLORIDE SERPL-SCNC: 108 MMOL/L (ref 98–107)
CLARITY, POC: CLEAR
CO2 SERPL-SCNC: 17 MMOL/L (ref 21–32)
CO2 SERPL-SCNC: 20 MMOL/L (ref 21–32)
COLOR, POC: YELLOW
CREAT SERPL-MCNC: 0.36 MG/DL (ref 0.5–1.05)
CREAT SERPL-MCNC: 0.41 MG/DL (ref 0.5–1.05)
CREAT UR-MCNC: 87.5 MG/DL (ref 20–320)
EGFRCR SERPLBLD CKD-EPI 2021: >90 ML/MIN/1.73M*2
EGFRCR SERPLBLD CKD-EPI 2021: >90 ML/MIN/1.73M*2
ERYTHROCYTE [DISTWIDTH] IN BLOOD BY AUTOMATED COUNT: 14.5 % (ref 11.5–14.5)
GLUCOSE BLD MANUAL STRIP-MCNC: 176 MG/DL (ref 74–99)
GLUCOSE BLD MANUAL STRIP-MCNC: 197 MG/DL (ref 74–99)
GLUCOSE BLD MANUAL STRIP-MCNC: 246 MG/DL (ref 74–99)
GLUCOSE BLD MANUAL STRIP-MCNC: 88 MG/DL (ref 74–99)
GLUCOSE BLD MANUAL STRIP-MCNC: 94 MG/DL (ref 74–99)
GLUCOSE BLD MANUAL STRIP-MCNC: 96 MG/DL (ref 74–99)
GLUCOSE BLDV-MCNC: 161 MG/DL (ref 74–99)
GLUCOSE SERPL-MCNC: 165 MG/DL (ref 74–99)
GLUCOSE SERPL-MCNC: 90 MG/DL (ref 74–99)
GLUCOSE URINE, POC: NORMAL
HCO3 BLDV-SCNC: 17.7 MMOL/L (ref 22–26)
HCO3 BLDV-SCNC: 18.2 MMOL/L (ref 22–26)
HCT VFR BLD AUTO: 38.4 % (ref 36–46)
HCT VFR BLD EST: 33 % (ref 36–46)
HGB BLD-MCNC: 12 G/DL (ref 12–16)
HGB BLDV-MCNC: 11 G/DL (ref 12–16)
INHALED O2 CONCENTRATION: 21 %
INHALED O2 CONCENTRATION: 21 %
KETONES, POC: POSITIVE
LACTATE BLDV-SCNC: 1.8 MMOL/L (ref 0.4–2)
LEUKOCYTE EST, POC: NEGATIVE
MAGNESIUM SERPL-MCNC: 1.57 MG/DL (ref 1.6–2.4)
MCH RBC QN AUTO: 26.7 PG (ref 26–34)
MCHC RBC AUTO-ENTMCNC: 31.3 G/DL (ref 32–36)
MCV RBC AUTO: 85 FL (ref 80–100)
NITRITE, POC: NEGATIVE
NRBC BLD-RTO: 0 /100 WBCS (ref 0–0)
OXYHGB MFR BLDV: 96 % (ref 45–75)
OXYHGB MFR BLDV: 96.3 % (ref 45–75)
PCO2 BLDV: 28 MM HG (ref 41–51)
PCO2 BLDV: 30 MM HG (ref 41–51)
PH BLDV: 7.38 PH (ref 7.33–7.43)
PH BLDV: 7.42 PH (ref 7.33–7.43)
PH, POC: 6
PLATELET # BLD AUTO: 246 X10*3/UL (ref 150–450)
PO2 BLDV: 91 MM HG (ref 35–45)
PO2 BLDV: 95 MM HG (ref 35–45)
POC APPEARANCE OF BODY FLUID: CLEAR
POTASSIUM BLDV-SCNC: 3.8 MMOL/L (ref 3.5–5.3)
POTASSIUM SERPL-SCNC: 3.7 MMOL/L (ref 3.5–5.3)
POTASSIUM SERPL-SCNC: 4.1 MMOL/L (ref 3.5–5.3)
PROT SERPL-MCNC: 5.8 G/DL (ref 6.4–8.2)
PROT UR-ACNC: 39 MG/DL (ref 5–24)
PROT/CREAT UR: 0.45 MG/MG CREAT (ref 0–0.17)
RBC # BLD AUTO: 4.5 X10*6/UL (ref 4–5.2)
RH FACTOR (ANTIGEN D): NORMAL
SAO2 % BLDV: 98 % (ref 45–75)
SAO2 % BLDV: 99 % (ref 45–75)
SODIUM BLDV-SCNC: 134 MMOL/L (ref 136–145)
SODIUM SERPL-SCNC: 135 MMOL/L (ref 136–145)
SODIUM SERPL-SCNC: 137 MMOL/L (ref 136–145)
SPECIFIC GRAVITY, POC: 1.03
TREPONEMA PALLIDUM IGG+IGM AB [PRESENCE] IN SERUM OR PLASMA BY IMMUNOASSAY: NONREACTIVE
URINE PROTEIN, POC: NORMAL
UROBILINOGEN, POC: 0.2
WBC # BLD AUTO: 8.5 X10*3/UL (ref 4.4–11.3)

## 2024-03-07 PROCEDURE — 81002 URINALYSIS NONAUTO W/O SCOPE: CPT

## 2024-03-07 PROCEDURE — 86901 BLOOD TYPING SEROLOGIC RH(D): CPT

## 2024-03-07 PROCEDURE — 99222 1ST HOSP IP/OBS MODERATE 55: CPT

## 2024-03-07 PROCEDURE — 36415 COLL VENOUS BLD VENIPUNCTURE: CPT

## 2024-03-07 PROCEDURE — 2500000004 HC RX 250 GENERAL PHARMACY W/ HCPCS (ALT 636 FOR OP/ED)

## 2024-03-07 PROCEDURE — 2500000001 HC RX 250 WO HCPCS SELF ADMINISTERED DRUGS (ALT 637 FOR MEDICARE OP)

## 2024-03-07 PROCEDURE — 83735 ASSAY OF MAGNESIUM: CPT

## 2024-03-07 PROCEDURE — 82010 KETONE BODYS QUAN: CPT

## 2024-03-07 PROCEDURE — 82570 ASSAY OF URINE CREATININE: CPT

## 2024-03-07 PROCEDURE — 59025 FETAL NON-STRESS TEST: CPT | Mod: GC | Performed by: OBSTETRICS & GYNECOLOGY

## 2024-03-07 PROCEDURE — 1210000001 HC SEMI-PRIVATE ROOM DAILY

## 2024-03-07 PROCEDURE — 82947 ASSAY GLUCOSE BLOOD QUANT: CPT | Performed by: OBSTETRICS & GYNECOLOGY

## 2024-03-07 PROCEDURE — 82805 BLOOD GASES W/O2 SATURATION: CPT

## 2024-03-07 PROCEDURE — 84132 ASSAY OF SERUM POTASSIUM: CPT

## 2024-03-07 PROCEDURE — 99215 OFFICE O/P EST HI 40 MIN: CPT | Performed by: OBSTETRICS & GYNECOLOGY

## 2024-03-07 PROCEDURE — 2500000002 HC RX 250 W HCPCS SELF ADMINISTERED DRUGS (ALT 637 FOR MEDICARE OP, ALT 636 FOR OP/ED)

## 2024-03-07 PROCEDURE — 82947 ASSAY GLUCOSE BLOOD QUANT: CPT

## 2024-03-07 PROCEDURE — 99215 OFFICE O/P EST HI 40 MIN: CPT | Mod: GC,25 | Performed by: OBSTETRICS & GYNECOLOGY

## 2024-03-07 PROCEDURE — 59025 FETAL NON-STRESS TEST: CPT | Performed by: OBSTETRICS & GYNECOLOGY

## 2024-03-07 PROCEDURE — 86780 TREPONEMA PALLIDUM: CPT

## 2024-03-07 PROCEDURE — 59025 FETAL NON-STRESS TEST: CPT

## 2024-03-07 PROCEDURE — 59025 FETAL NON-STRESS TEST: CPT | Mod: GC

## 2024-03-07 PROCEDURE — 85027 COMPLETE CBC AUTOMATED: CPT

## 2024-03-07 RX ORDER — NIFEDIPINE 10 MG/1
10 CAPSULE ORAL ONCE AS NEEDED
Status: DISCONTINUED | OUTPATIENT
Start: 2024-03-07 | End: 2024-03-07

## 2024-03-07 RX ORDER — NIFEDIPINE 10 MG/1
10 CAPSULE ORAL ONCE AS NEEDED
Status: DISCONTINUED | OUTPATIENT
Start: 2024-03-07 | End: 2024-03-15

## 2024-03-07 RX ORDER — DEXTROSE 40 %
30 GEL (GRAM) ORAL
Status: DISCONTINUED | OUTPATIENT
Start: 2024-03-07 | End: 2024-03-15

## 2024-03-07 RX ORDER — DEXTROSE MONOHYDRATE 100 MG/ML
150 INJECTION, SOLUTION INTRAVENOUS CONTINUOUS
Status: DISCONTINUED | OUTPATIENT
Start: 2024-03-07 | End: 2024-03-08

## 2024-03-07 RX ORDER — LABETALOL HYDROCHLORIDE 5 MG/ML
20 INJECTION, SOLUTION INTRAVENOUS ONCE AS NEEDED
Status: DISCONTINUED | OUTPATIENT
Start: 2024-03-07 | End: 2024-03-15

## 2024-03-07 RX ORDER — CYCLOBENZAPRINE HCL 10 MG
10 TABLET ORAL ONCE
Status: COMPLETED | OUTPATIENT
Start: 2024-03-07 | End: 2024-03-07

## 2024-03-07 RX ORDER — SODIUM CHLORIDE, SODIUM LACTATE, POTASSIUM CHLORIDE, CALCIUM CHLORIDE 600; 310; 30; 20 MG/100ML; MG/100ML; MG/100ML; MG/100ML
125 INJECTION, SOLUTION INTRAVENOUS CONTINUOUS
Status: DISCONTINUED | OUTPATIENT
Start: 2024-03-07 | End: 2024-03-07

## 2024-03-07 RX ORDER — ONDANSETRON HYDROCHLORIDE 2 MG/ML
4 INJECTION, SOLUTION INTRAVENOUS EVERY 6 HOURS PRN
Status: DISCONTINUED | OUTPATIENT
Start: 2024-03-07 | End: 2024-03-07

## 2024-03-07 RX ORDER — DEXTROSE 40 %
15 GEL (GRAM) ORAL
Status: DISCONTINUED | OUTPATIENT
Start: 2024-03-07 | End: 2024-03-15

## 2024-03-07 RX ORDER — HYDRALAZINE HYDROCHLORIDE 20 MG/ML
5 INJECTION INTRAMUSCULAR; INTRAVENOUS ONCE AS NEEDED
Status: DISCONTINUED | OUTPATIENT
Start: 2024-03-07 | End: 2024-03-07

## 2024-03-07 RX ORDER — INSULIN GLARGINE 100 [IU]/ML
10 INJECTION, SOLUTION SUBCUTANEOUS
Status: DISCONTINUED | OUTPATIENT
Start: 2024-03-08 | End: 2024-03-09

## 2024-03-07 RX ORDER — ONDANSETRON 4 MG/1
4 TABLET, FILM COATED ORAL EVERY 6 HOURS PRN
Status: DISCONTINUED | OUTPATIENT
Start: 2024-03-07 | End: 2024-03-07

## 2024-03-07 RX ORDER — LABETALOL HYDROCHLORIDE 5 MG/ML
20 INJECTION, SOLUTION INTRAVENOUS ONCE AS NEEDED
Status: DISCONTINUED | OUTPATIENT
Start: 2024-03-07 | End: 2024-03-07

## 2024-03-07 RX ORDER — HYDRALAZINE HYDROCHLORIDE 20 MG/ML
5 INJECTION INTRAMUSCULAR; INTRAVENOUS ONCE AS NEEDED
Status: DISCONTINUED | OUTPATIENT
Start: 2024-03-07 | End: 2024-03-15

## 2024-03-07 RX ORDER — CAFFEINE 200 MG
200 TABLET ORAL ONCE
Status: COMPLETED | OUTPATIENT
Start: 2024-03-07 | End: 2024-03-07

## 2024-03-07 RX ORDER — BISACODYL 10 MG/1
10 SUPPOSITORY RECTAL DAILY PRN
Status: DISCONTINUED | OUTPATIENT
Start: 2024-03-07 | End: 2024-03-15

## 2024-03-07 RX ORDER — ONDANSETRON HYDROCHLORIDE 2 MG/ML
4 INJECTION, SOLUTION INTRAVENOUS EVERY 6 HOURS PRN
Status: DISCONTINUED | OUTPATIENT
Start: 2024-03-07 | End: 2024-03-15

## 2024-03-07 RX ORDER — POLYETHYLENE GLYCOL 3350 17 G/17G
17 POWDER, FOR SOLUTION ORAL 2 TIMES DAILY PRN
Status: DISCONTINUED | OUTPATIENT
Start: 2024-03-07 | End: 2024-03-15

## 2024-03-07 RX ORDER — DEXTROSE 50 % IN WATER (D50W) INTRAVENOUS SYRINGE
25
Status: DISCONTINUED | OUTPATIENT
Start: 2024-03-07 | End: 2024-03-15

## 2024-03-07 RX ORDER — METOCLOPRAMIDE HYDROCHLORIDE 5 MG/ML
10 INJECTION INTRAMUSCULAR; INTRAVENOUS EVERY 6 HOURS PRN
Status: DISCONTINUED | OUTPATIENT
Start: 2024-03-07 | End: 2024-03-15

## 2024-03-07 RX ORDER — LIDOCAINE HYDROCHLORIDE 10 MG/ML
0.5 INJECTION INFILTRATION; PERINEURAL ONCE AS NEEDED
Status: DISCONTINUED | OUTPATIENT
Start: 2024-03-07 | End: 2024-03-15

## 2024-03-07 RX ORDER — SODIUM CHLORIDE 450 MG/100ML
250 INJECTION, SOLUTION INTRAVENOUS CONTINUOUS
Status: DISCONTINUED | OUTPATIENT
Start: 2024-03-07 | End: 2024-03-08

## 2024-03-07 RX ORDER — ADHESIVE BANDAGE
10 BANDAGE TOPICAL
Status: DISCONTINUED | OUTPATIENT
Start: 2024-03-07 | End: 2024-03-15

## 2024-03-07 RX ORDER — INSULIN GLARGINE 100 [IU]/ML
16 INJECTION, SOLUTION SUBCUTANEOUS NIGHTLY
Status: DISCONTINUED | OUTPATIENT
Start: 2024-03-08 | End: 2024-03-07

## 2024-03-07 RX ORDER — DEXTROSE 40 %
30 GEL (GRAM) ORAL
Status: DISCONTINUED | OUTPATIENT
Start: 2024-03-07 | End: 2024-03-07

## 2024-03-07 RX ORDER — DEXTROSE MONOHYDRATE AND SODIUM CHLORIDE 5; .45 G/100ML; G/100ML
150 INJECTION, SOLUTION INTRAVENOUS CONTINUOUS
Status: DISCONTINUED | OUTPATIENT
Start: 2024-03-07 | End: 2024-03-08

## 2024-03-07 RX ORDER — INSULIN LISPRO 100 [IU]/ML
0-10 INJECTION, SOLUTION INTRAVENOUS; SUBCUTANEOUS
Status: DISCONTINUED | OUTPATIENT
Start: 2024-03-07 | End: 2024-03-15

## 2024-03-07 RX ORDER — ONDANSETRON 4 MG/1
4 TABLET, FILM COATED ORAL EVERY 6 HOURS PRN
Status: DISCONTINUED | OUTPATIENT
Start: 2024-03-07 | End: 2024-03-15

## 2024-03-07 RX ORDER — DEXTROSE 50 % IN WATER (D50W) INTRAVENOUS SYRINGE
50
Status: DISCONTINUED | OUTPATIENT
Start: 2024-03-07 | End: 2024-03-15

## 2024-03-07 RX ORDER — INSULIN GLARGINE 100 [IU]/ML
10 INJECTION, SOLUTION SUBCUTANEOUS ONCE
Status: COMPLETED | OUTPATIENT
Start: 2024-03-07 | End: 2024-03-07

## 2024-03-07 RX ORDER — INSULIN LISPRO 100 [IU]/ML
0-10 INJECTION, SOLUTION INTRAVENOUS; SUBCUTANEOUS
Status: DISCONTINUED | OUTPATIENT
Start: 2024-03-07 | End: 2024-03-07

## 2024-03-07 RX ORDER — LIDOCAINE HYDROCHLORIDE 10 MG/ML
0.5 INJECTION INFILTRATION; PERINEURAL ONCE AS NEEDED
Status: DISCONTINUED | OUTPATIENT
Start: 2024-03-07 | End: 2024-03-07

## 2024-03-07 RX ORDER — INSULIN GLARGINE 100 [IU]/ML
16 INJECTION, SOLUTION SUBCUTANEOUS NIGHTLY
Status: DISCONTINUED | OUTPATIENT
Start: 2024-03-07 | End: 2024-03-09

## 2024-03-07 RX ORDER — DIPHENHYDRAMINE HCL 25 MG
25 CAPSULE ORAL ONCE
Status: COMPLETED | OUTPATIENT
Start: 2024-03-07 | End: 2024-03-07

## 2024-03-07 RX ORDER — METOCLOPRAMIDE 10 MG/1
10 TABLET ORAL ONCE
Status: COMPLETED | OUTPATIENT
Start: 2024-03-07 | End: 2024-03-07

## 2024-03-07 RX ORDER — DEXTROSE 50 % IN WATER (D50W) INTRAVENOUS SYRINGE
25
Status: DISCONTINUED | OUTPATIENT
Start: 2024-03-07 | End: 2024-03-07

## 2024-03-07 RX ORDER — METOCLOPRAMIDE 10 MG/1
10 TABLET ORAL EVERY 6 HOURS PRN
Status: DISCONTINUED | OUTPATIENT
Start: 2024-03-07 | End: 2024-03-15

## 2024-03-07 RX ORDER — ACETAMINOPHEN 325 MG/1
975 TABLET ORAL ONCE
Status: COMPLETED | OUTPATIENT
Start: 2024-03-07 | End: 2024-03-07

## 2024-03-07 RX ORDER — SIMETHICONE 80 MG
80 TABLET,CHEWABLE ORAL 4 TIMES DAILY PRN
Status: DISCONTINUED | OUTPATIENT
Start: 2024-03-07 | End: 2024-03-15

## 2024-03-07 RX ORDER — DEXTROSE 40 %
15 GEL (GRAM) ORAL
Status: DISCONTINUED | OUTPATIENT
Start: 2024-03-07 | End: 2024-03-07

## 2024-03-07 RX ADMIN — INSULIN LISPRO 2 UNITS: 100 INJECTION, SOLUTION INTRAVENOUS; SUBCUTANEOUS at 22:54

## 2024-03-07 RX ADMIN — CAFFEINE 200 MG: 200 TABLET ORAL at 22:45

## 2024-03-07 RX ADMIN — METOCLOPRAMIDE 10 MG: 10 TABLET ORAL at 20:43

## 2024-03-07 RX ADMIN — INSULIN GLARGINE 16 UNITS: 100 INJECTION, SOLUTION SUBCUTANEOUS at 20:44

## 2024-03-07 RX ADMIN — INSULIN LISPRO 2 UNITS: 100 INJECTION, SOLUTION INTRAVENOUS; SUBCUTANEOUS at 13:29

## 2024-03-07 RX ADMIN — DIPHENHYDRAMINE HYDROCHLORIDE 25 MG: 25 CAPSULE ORAL at 22:54

## 2024-03-07 RX ADMIN — CYCLOBENZAPRINE 10 MG: 10 TABLET, FILM COATED ORAL at 22:54

## 2024-03-07 RX ADMIN — SODIUM CHLORIDE, POTASSIUM CHLORIDE, SODIUM LACTATE AND CALCIUM CHLORIDE 2000 ML: 600; 310; 30; 20 INJECTION, SOLUTION INTRAVENOUS at 13:30

## 2024-03-07 RX ADMIN — ACETAMINOPHEN 975 MG: 325 TABLET ORAL at 20:43

## 2024-03-07 RX ADMIN — INSULIN GLARGINE 10 UNITS: 100 INJECTION, SOLUTION SUBCUTANEOUS at 13:29

## 2024-03-07 SDOH — ECONOMIC STABILITY: HOUSING INSECURITY: DO YOU FEEL UNSAFE GOING BACK TO THE PLACE WHERE YOU ARE LIVING?: NO

## 2024-03-07 SDOH — SOCIAL STABILITY: SOCIAL INSECURITY: VERBAL ABUSE: DENIES

## 2024-03-07 SDOH — SOCIAL STABILITY: SOCIAL INSECURITY: HAS ANYONE EVER THREATENED TO HURT YOUR FAMILY OR YOUR PETS?: NO

## 2024-03-07 SDOH — HEALTH STABILITY: MENTAL HEALTH: NON-SPECIFIC ACTIVE SUICIDAL THOUGHTS (PAST 1 MONTH): NO

## 2024-03-07 SDOH — SOCIAL STABILITY: SOCIAL INSECURITY: ARE THERE ANY APPARENT SIGNS OF INJURIES/BEHAVIORS THAT COULD BE RELATED TO ABUSE/NEGLECT?: NO

## 2024-03-07 SDOH — HEALTH STABILITY: MENTAL HEALTH: WISH TO BE DEAD (PAST 1 MONTH): NO

## 2024-03-07 SDOH — SOCIAL STABILITY: SOCIAL INSECURITY: DO YOU FEEL ANYONE HAS EXPLOITED OR TAKEN ADVANTAGE OF YOU FINANCIALLY OR OF YOUR PERSONAL PROPERTY?: NO

## 2024-03-07 SDOH — HEALTH STABILITY: MENTAL HEALTH: HAVE YOU USED ANY PRESCRIPTION DRUGS OTHER THAN PRESCRIBED IN THE PAST 12 MONTHS?: NO

## 2024-03-07 SDOH — SOCIAL STABILITY: SOCIAL INSECURITY: ABUSE SCREEN: ADULT

## 2024-03-07 SDOH — HEALTH STABILITY: MENTAL HEALTH: HAVE YOU USED ANY SUBSTANCES (CANABIS, COCAINE, HEROIN, HALLUCINOGENS, INHALANTS, ETC.) IN THE PAST 12 MONTHS?: NO

## 2024-03-07 SDOH — SOCIAL STABILITY: SOCIAL INSECURITY: HAVE YOU HAD THOUGHTS OF HARMING ANYONE ELSE?: NO

## 2024-03-07 SDOH — SOCIAL STABILITY: SOCIAL INSECURITY: PHYSICAL ABUSE: DENIES

## 2024-03-07 SDOH — SOCIAL STABILITY: SOCIAL INSECURITY: ARE YOU OR HAVE YOU BEEN THREATENED OR ABUSED PHYSICALLY, EMOTIONALLY, OR SEXUALLY BY ANYONE?: NO

## 2024-03-07 SDOH — HEALTH STABILITY: MENTAL HEALTH: WERE YOU ABLE TO COMPLETE ALL THE BEHAVIORAL HEALTH SCREENINGS?: YES

## 2024-03-07 SDOH — SOCIAL STABILITY: SOCIAL INSECURITY: DOES ANYONE TRY TO KEEP YOU FROM HAVING/CONTACTING OTHER FRIENDS OR DOING THINGS OUTSIDE YOUR HOME?: NO

## 2024-03-07 SDOH — HEALTH STABILITY: MENTAL HEALTH: SUICIDAL BEHAVIOR (LIFETIME): NO

## 2024-03-07 ASSESSMENT — LIFESTYLE VARIABLES
HOW OFTEN DO YOU HAVE 6 OR MORE DRINKS ON ONE OCCASION: NEVER
HOW MANY STANDARD DRINKS CONTAINING ALCOHOL DO YOU HAVE ON A TYPICAL DAY: PATIENT DOES NOT DRINK
HOW OFTEN DO YOU HAVE A DRINK CONTAINING ALCOHOL: NEVER
AUDIT-C TOTAL SCORE: 0
SKIP TO QUESTIONS 9-10: 1
AUDIT-C TOTAL SCORE: 0

## 2024-03-07 ASSESSMENT — PAIN SCALES - GENERAL
PAINLEVEL: 0 - NO PAIN
PAINLEVEL_OUTOF10: 10 - WORST POSSIBLE PAIN
PAINLEVEL_OUTOF10: 0 - NO PAIN
PAINLEVEL_OUTOF10: 10 - WORST POSSIBLE PAIN

## 2024-03-07 ASSESSMENT — PATIENT HEALTH QUESTIONNAIRE - PHQ9
1. LITTLE INTEREST OR PLEASURE IN DOING THINGS: NOT AT ALL
SUM OF ALL RESPONSES TO PHQ9 QUESTIONS 1 & 2: 0
2. FEELING DOWN, DEPRESSED OR HOPELESS: NOT AT ALL

## 2024-03-07 ASSESSMENT — PAIN DESCRIPTION - DESCRIPTORS: DESCRIPTORS: HEADACHE

## 2024-03-07 ASSESSMENT — PAIN DESCRIPTION - LOCATION: LOCATION: HEAD

## 2024-03-07 ASSESSMENT — PAIN - FUNCTIONAL ASSESSMENT
PAIN_FUNCTIONAL_ASSESSMENT: 0-10

## 2024-03-07 ASSESSMENT — ACTIVITIES OF DAILY LIVING (ADL): LACK_OF_TRANSPORTATION: NO

## 2024-03-07 NOTE — DISCHARGE INSTRUCTIONS

## 2024-03-07 NOTE — PROGRESS NOTES
MFM Follow-up  3/7/2024         SUBJECTIVE    HPI: Scooter Granger is a 23 y.o.  at 35w0d here for RPNV.   Pt reports difficulty getting and using her insulin at home. She was discharged from the hospital on 3/1.    OBJECTIVE    Visit Vitals  BP (!) 136/96   Wt 101 kg (223 lb)   BMI 37.11 kg/m²   OB Status Pregnant   Smoking Status Never   BSA 2.15 m²      General: no acute distress  HEENT: normocephalic, atraumatic  Lungs: no increased WOB  Extremities: moving all extremities  Neuro: awake and conversant  NST: nonreactive, [place] quiet  FHT: 148      ASSESSMENT & PLAN    Scooter Granger is a 23 y.o.  at 35w0d here for the following concerns we addressed today:    Insulin controlled gestational diabetes mellitus (GDM) in third trimester  POCT  today: 246  Patient is not taking insulin citing insurance reasons and not able to pick it up. Discussed the risk of stillbirth again. Given inability to comply with recommendations for management for A2DM and significant concern for stillbirth recommend presenting to the hospital. We discussed that our goal would be to manage her inpatient and plan for delivery at 36-37 weeks and if she is not able to stay in the hospital we would recommend moving towards delivery. Patient is amenable to staying in the hospital and being managed inpatient for her GDM but would like to be delivered at 36 weeks which is reasonable. We discussed that given her new elevation in BP an earlier delivery may be indicated which she understands and accepts          History of pre-eclampsia in prior pregnancy, currently pregnant  cHTN, on no meds  BP: 152/97 today    History of shoulder dystocia in prior pregnancy, currently pregnant  , 36.0wks, sPEC, A1DM   No residual deficits in that child, no shoulder in x2 35w deliveries (P1 and P3)    Hypertension affecting pregnancy in third trimester  Taking AASA  BP: 136/90 repeat 157/97  Denies any symptoms however prior deliveries have  been for preE with SF. Discussed possible early delivery is severe features are diagnosed         Orders Placed This Encounter   Procedures    POCT GLUCOSE 246     Order Specific Question:   Release result to [facility]     Answer:   Immediate        After detailed discussion about her condition and multiple options including staying inpatient for management of her blood glucose, blood pressure and fetal monitoring or induction of labor patient agreed with the option of inpatient management.    Patient seen and evaluated with Dr. Lorna Smiley MD PGY1

## 2024-03-07 NOTE — PROGRESS NOTES
NST non reactive per Dr Rothman  and MD informed of BP. Pt reports no headaches, visual changes and that she has not been taking any insulin since discharge from recent admission to the hospital Pt for visit now with TJ.

## 2024-03-07 NOTE — CONSULTS
.MFM Consult    Reason for Consult: poorly controlled GDM and gHTN     HPI     Scooter is a  at 35w0d by 8wk US presenting from office for elevated blood sugar and elevated BP in the setting of poorly controlled GDM and gHTN.      Patient recently admitted  - 3/1 for ketosis without acidosis in the seytting of not taking insulin. Patient was discharged home on Lantus 34 units but hasn't been able to start due to insurance issues and fear of needles. Today, patient had non-reactive NST and  in office today. Denies HA, N/V, feeling jittery or low BGs    Patient also had worsening elevated mild range Bps in clinic. Endorses worsening lower extremity swelling that improves with leg elevation. Denies HA, vision changes, CP, SOB, or RUQ pain.     Denies ctx, LOF, VB, or decreased fetal movement.      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       Pregnancy Problems (from 10/19/23 to present)       Problem Noted Resolved    Gestational diabetes 3/7/2024 by Renetta Charles MD No    Priority:  Medium      Labor and delivery indication for care or intervention 2024 by Cherry Flores MD No    Priority:  Medium      History of postpartum hemorrhage, currently pregnant 2024 by Renetta Telles MD No    Priority:  Medium      History of shoulder dystocia in prior pregnancy, currently pregnant 2024 by Renetta Telles MD No    Priority:  Medium      Overview Addendum 2024  1:55 PM by Renetta Telles MD     , 36.0wks, sPEC, A1DM   No residual deficits in that child, no shoulder in x2 35w deliveries (P1 and P3)         Insulin controlled gestational diabetes mellitus (GDM) in third trimester 2024 by Renetta Baldwin RN No    Priority:  Medium      Overview Addendum 3/6/2024  9:00 AM by Karlene Smiley MD     By 1-hour  244    [x] MFM Consult/education  [ ] Serial growth ultrasounds   2/1 Growth US: EFW 1635g 27%, AC 11%. AFV normal    Fetal surveillance:  [x] Weekly at 32 weeks  [] Twice weekly at 36 weeks      2/5/2024 Begin NPH 10/16  2/8/2024: Switched to Lantus 10/16  3/1/2024: Lantus 34 nightly         35 weeks gestation of pregnancy 1/16/2024 by PRASANTH Avery-CNP No    Priority:  Medium      Overview Addendum 3/7/2024 11:19 AM by Abdon Rothman MD     Dating:   [x] Initial BMI: 35  [x] Prenatal Labs: reviewed and wnl (11/2)  [x] Aneuploidy Screening: RR First Check- normal quad screen (after corrected for GA)  [x] Baby ASA: compliant  [x] Anatomy US: wnl   [x] 1hr GCT at 24-28wks: 1-hr 244, GDM   [x] Tdap (27-36wks): 2/8/2024   [x] Flu Shot: 9/2023  [x] COVID vaccine:  booster 2/1/24  [x] Rhogam (if Rh neg): Rh+ not indicated   [x] GBS at 36 wks: 2/22: GBS negative  [x] Breastfeeding: yes, wants to buy hands free pump, declined sending rx  [x] PPBC: s/p BS (was pregnant right before tubal, had negative UPT prior)  [] Mode of delivery:  anticipate vaginal, had prior PPH with first child and Shoulder dystocia with P2, no residual deficits for that child         History of pre-eclampsia in prior pregnancy, currently pregnant 1/16/2024 by PRASANTH Avery-CNP No    Priority:  Medium      Overview Addendum 2/1/2024  1:55 PM by Renetta Telles MD     On Asa ppx         Hypertension affecting pregnancy in third trimester 11/2/2023 by Dylon Song MD No    Priority:  Medium      Overview Addendum 3/7/2024 11:11 AM by Abdon Rothman MD     Not on meds, asa ppx  Baseline PEC labs wnl, 1/16 P:C 0.11  [ ] Serial growth: 3/1: 3285g (>99%ile), AC >99%ile           Depression affecting pregnancy in third trimester, antepartum 10/18/2023 by Anne Marie Vera No    Priority:  Medium      Overview Addendum 2/1/2024  1:55 PM by Renetta Telles MD     - on seroquel outside of pregnancy, not taking currently  Mood good           Pelvic pain affecting pregnancy in second trimester, antepartum 10/19/2023 by Zoila Wolf MD 3/7/2024 by Renetta Charles MD    Overview Signed 10/19/2023 12:53 PM by Zoila Wolf MD     - s/p ED visits x2, elevated WBC otherwise labs wnl  - CT unable to be completed to r/o appendicitis   - symptoms improved today                      Obstetrical History   OB History          5    Para   3    Term   0       3    AB   1    Living   3         SAB        IAB        Ectopic        Multiple        Live Births   3                 Past Medical History  Past Medical History:   Diagnosis Date    Degenerative myopia, bilateral 2020    Bilateral degenerative progressive high myopia    Encounter for screening for infections with a predominantly sexual mode of transmission 2020    Routine screening for STI (sexually transmitted infection)    History of gestational diabetes 10/18/2023    For early 1-hr GTT    Lattice degeneration of retina, 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    Personal history of other infectious and parasitic diseases 2018    History of herpes labialis    Unspecified chorioretinal scars, bilateral 2020    Chorioretinal scar, both eyes        Past Surgical History   Past Surgical History:   Procedure Laterality Date    OTHER SURGICAL HISTORY  2017    Surg Results Vision Left Eye Central As Expected       Social History  Social History     Socioeconomic History    Marital status: Single     Spouse name: Not on file    Number of children: Not on file    Years of education: Not on file    Highest education level: Not on file   Occupational History    Not on file   Tobacco Use    Smoking status: Never     Passive exposure: Never    Smokeless tobacco: Never   Vaping Use    Vaping Use: Never used   Substance and Sexual Activity    Alcohol use: Not Currently    Drug use: Never    Sexual activity: Yes     Partners: Male     Comment: pregnant    Other Topics Concern    Not on file   Social History Narrative    Not on file     Social Determinants of Health     Financial Resource Strain: Low Risk  (3/7/2024)    Overall Financial Resource Strain (CARDIA)     Difficulty of Paying Living Expenses: Not hard at all   Food Insecurity: Not on file   Transportation Needs: No Transportation Needs (3/7/2024)    PRAPARE - Transportation     Lack of Transportation (Medical): No     Lack of Transportation (Non-Medical): No   Physical Activity: Not on file   Stress: Not on file   Social Connections: Not on file   Intimate Partner Violence: Not At Risk (10/17/2023)    Humiliation, Afraid, Rape, and Kick questionnaire     Fear of Current or Ex-Partner: No     Emotionally Abused: No     Physically Abused: No     Sexually Abused: No       Allergies  No Known Allergies    Medications  Medications Prior to Admission   Medication Sig Dispense Refill Last Dose    alcohol swabs pads, medicated Use 1, up to 5 times a day 200 each 3     aspirin 81 mg chewable tablet CHEW 2 TABLETS BY MOUTH ONCE DAILY 60 tablet 3     blood sugar diagnostic (Blood Glucose Test) strip Use 1 strip 4-6 times a day during pregnancy 150 each 3     blood sugar diagnostic (OneTouch Verio test strips) strip Use 1 strip 4-6 times per day to test blood sugar during pregnancy 150 each 3     blood-glucose meter (Accu-Chek Guide Glucose Meter) misc Use for testing blood sugar in pregnancy 1 each 0     blood-glucose meter misc Use for testing blood sugar fasting and 1 hour after each meal. 4-6 times per day during pregnancy 1 each 0     insulin glargine (Lantus) 100 unit/mL (3 mL) pen Inject 34 units before bed. 15 mL 3     lancets 33 gauge misc Use 1 lancet 4-6 times per day during pregnancy 200 each 3     lancets 33 gauge misc Use 1 lancet 4-6 times per day during pregnancy 200 each 3     magnesium oxide 500 mg tablet Take 1 tablet (500 mg) by mouth once daily. 60 tablet 1     metoclopramide (Reglan) 10 mg  "tablet Take 1 tablet (10 mg) by mouth every 8 hours if needed (headache). 12 tablet 0     miscellaneous medical supply (Blood Pressure Cuff) misc USE TO TEST BLOOD PRESSURE 1 each 0     pen needle, diabetic (Pen Needle) 32 gauge x 5/32\" needle Use 1 per injection, up to 5 times a day 200 each 3     prenatal vitamin, iron-folic, 27 mg iron-800 mcg folic acid tablet TAKE 1 TABLET BY MOUTH ONCE DAILY 30 tablet 4     riboflavin (vitamin B2) 100 mg tablet tablet Take 1 tablet (100 mg) by mouth once daily. 60 tablet 0     SUMAtriptan (Imitrex) 50 mg tablet Take 1 tablet (50 mg) by mouth 1 time if needed for migraine for up to 1 dose. May repeat dose once in 2 hours if no relief.  Do not exceed 2 doses in 24 hours. 1 tablet 0        OBJECTIVE:   /70   Pulse (!) 111   Temp 36.5 °C (97.7 °F) (Temporal)   Resp 16   Ht 1.651 m (5' 5\")   Wt 101 kg (223 lb 1.7 oz)   SpO2 98%   BMI 37.13 kg/m²    Temp  Min: 36.2 °C (97.2 °F)  Max: 36.5 °C (97.7 °F)  Pulse  Min: 86  Max: 112  BP  Min: 120/70  Max: 145/94    Physical exam:  General:  AAOx3, No acute distress  Cardiovascular: Warm and well perfused  Respiratory: Normal respiratory effort   Abdominal:  Soft, gravid, non-tender, no rebound or guarding, no RUQ pain, no palpable contractions   Extremities: Warm, well perfused, no edema, no calf tenderness     NST: Baseline 145, accelerations +, - decelerations, moderate variability   [place]: irreg ctx      Labs:   .  Results for orders placed or performed during the hospital encounter of 03/07/24 (from the past 24 hour(s))   POCT GLUCOSE   Result Value Ref Range    POCT Glucose 176 (H) 74 - 99 mg/dL   Comprehensive metabolic panel   Result Value Ref Range    Glucose 165 (H) 74 - 99 mg/dL    Sodium 135 (L) 136 - 145 mmol/L    Potassium 4.1 3.5 - 5.3 mmol/L    Chloride 107 98 - 107 mmol/L    Bicarbonate 17 (L) 21 - 32 mmol/L    Anion Gap 15 10 - 20 mmol/L    Urea Nitrogen 9 6 - 23 mg/dL    Creatinine 0.41 (L) 0.50 - 1.05 mg/dL "    eGFR >90 >60 mL/min/1.73m*2    Calcium 9.0 8.6 - 10.6 mg/dL    Albumin 3.2 (L) 3.4 - 5.0 g/dL    Alkaline Phosphatase 173 (H) 33 - 110 U/L    Total Protein 5.8 (L) 6.4 - 8.2 g/dL    AST 10 9 - 39 U/L    Bilirubin, Total 0.2 0.0 - 1.2 mg/dL    ALT 4 (L) 7 - 45 U/L   CBC   Result Value Ref Range    WBC 8.5 4.4 - 11.3 x10*3/uL    nRBC 0.0 0.0 - 0.0 /100 WBCs    RBC 4.50 4.00 - 5.20 x10*6/uL    Hemoglobin 12.0 12.0 - 16.0 g/dL    Hematocrit 38.4 36.0 - 46.0 %    MCV 85 80 - 100 fL    MCH 26.7 26.0 - 34.0 pg    MCHC 31.3 (L) 32.0 - 36.0 g/dL    RDW 14.5 11.5 - 14.5 %    Platelets 246 150 - 450 x10*3/uL   Protein, Urine Random   Result Value Ref Range    Total Protein, Urine Random 39 (H) 5 - 24 mg/dL    Creatinine, Urine Random 87.5 20.0 - 320.0 mg/dL    T. Protein/Creatinine Ratio 0.45 (H) 0.00 - 0.17 mg/mg Creat   Type And Screen   Result Value Ref Range    ABO TYPE O     Rh TYPE POS     ANTIBODY SCREEN NEG    Syphilis Screen with Reflex   Result Value Ref Range    Syphilis Total Ab Nonreactive Nonreactive   Beta Hydroxybutyrate   Result Value Ref Range    Beta-Hydroxybutyrate 0.26 0.02 - 0.27 mmol/L   BLOOD GAS VENOUS FULL PANEL   Result Value Ref Range    POCT pH, Venous 7.38 7.33 - 7.43 pH    POCT pCO2, Venous 30 (L) 41 - 51 mm Hg    POCT pO2, Venous 95 (H) 35 - 45 mm Hg    POCT SO2, Venous 99 (H) 45 - 75 %    POCT Oxy Hemoglobin, Venous 96.3 (H) 45.0 - 75.0 %    POCT Hematocrit Calculated, Venous 33.0 (L) 36.0 - 46.0 %    POCT Sodium, Venous 134 (L) 136 - 145 mmol/L    POCT Potassium, Venous 3.8 3.5 - 5.3 mmol/L    POCT Chloride, Venous 105 98 - 107 mmol/L    POCT Ionized [place], Venous 1.20 1.10 - 1.33 mmol/L    POCT Glucose, Venous 161 (H) 74 - 99 mg/dL    POCT Lactate, Venous 1.8 0.4 - 2.0 mmol/L    POCT Base Excess, Venous -6.4 (L) -2.0 - 3.0 mmol/L    POCT HCO3 Calculated, Venous 17.7 (L) 22.0 - 26.0 mmol/L    POCT Hemoglobin, Venous 11.0 (L) 12.0 - 16.0 g/dL    POCT Anion Gap, Venous 15.0 10.0 - 25.0  mmol/L    Patient Temperature 37.0 degrees Celsius    FiO2 21 %   POCT urinalysis dipstick manually resulted   Result Value Ref Range    Color, UA Yellow     Clarity, UA Clear     Glucose, UA (3+) Moderate     Bilirubin, UA NEGATIVE     Ketones, UA Positive     Spec Grav, UA 1.030     Blood, UA Positive     pH, UA 6.0     Protein, UA (1+) Rare     Urobilinogen, UA 0.2     Leukocytes, UA NEGATIVE     Nitrite, UA NEGATIVE     Appearance, Fluid Clear    POCT GLUCOSE   Result Value Ref Range    POCT Glucose 94 74 - 99 mg/dL   POCT GLUCOSE   Result Value Ref Range    POCT Glucose 96 74 - 99 mg/dL   Basic metabolic panel   Result Value Ref Range    Glucose 90 74 - 99 mg/dL    Sodium 137 136 - 145 mmol/L    Potassium 3.7 3.5 - 5.3 mmol/L    Chloride 108 (H) 98 - 107 mmol/L    Bicarbonate 20 (L) 21 - 32 mmol/L    Anion Gap 13 10 - 20 mmol/L    Urea Nitrogen 7 6 - 23 mg/dL    Creatinine 0.36 (L) 0.50 - 1.05 mg/dL    eGFR >90 >60 mL/min/1.73m*2    Calcium 8.8 8.6 - 10.6 mg/dL   Beta Hydroxybutyrate   Result Value Ref Range    Beta-Hydroxybutyrate 0.64 (H) 0.02 - 0.27 mmol/L   Blood Gas Venous   Result Value Ref Range    POCT pH, Venous 7.42 7.33 - 7.43 pH    POCT pCO2, Venous 28 (L) 41 - 51 mm Hg    POCT pO2, Venous 91 (H) 35 - 45 mm Hg    POCT SO2, Venous 98 (H) 45 - 75 %    POCT Oxy Hemoglobin, Venous 96.0 (H) 45.0 - 75.0 %    POCT Base Excess, Venous -4.9 (L) -2.0 - 3.0 mmol/L    POCT HCO3 Calculated, Venous 18.2 (L) 22.0 - 26.0 mmol/L    Patient Temperature 37.0 degrees Celsius    FiO2 21 %   Magnesium   Result Value Ref Range    Magnesium 1.57 (L) 1.60 - 2.40 mg/dL   POCT GLUCOSE   Result Value Ref Range    POCT Glucose 88 74 - 99 mg/dL         ASSESSMENT AND PLAN:     Scooter is a  at 35w0d by 8wk US presenting from office for elevated blood sugar and elevated BP in the setting of poorly controlled GDM and gHTN.     GDM, insulin controlled, c/f DKA  -  in office, 176 on arrival -> Lantus 10U + 4U Lispro  SSI given -> BG 94 -> 88  - Udip with 1+ ketones, 3+ glucose, and elevated SG  - 2L LR given in triage -> continue mIVF 0.45% NS at 125   - DKA labs:              BHB 0.26 -> 0.64              Bicarb: 17.7 -> 20               AG: 15 -> 13              pH (venous): 7.38 -> 7.42               K: 4.1, replete as needed  - Slight anion gap on admission, now resolved after fluid and insulin administration. Increasing BHB possibly 2/2 dehydration vs DKA given non-acidotic state. Will defer insulin gtt at this time, due to lower suspicion of DKA  - AM BHB, CBC+diff, CMP, Mg pending.   - Will continue Lantus 10u/16u. POCT Fasting and 1hr post-prandial POCT and SSI w/meals     siPEC without severe features   - mild range in office, previously majority Bps normotensive, not on meds  - HELLP labs negative. AM HELLP labs pending   - P:C 0.45, baseline 0.1  - h/o sPEC in prior pregnancies  - asymptomatic      Fetal status:  -Reassuring, NST reactive   - continue daily NSTs while inpatient   -  EFW on 3/1: 3285g (>99%), AC >99%  - BMZ deferred in the setting of late  and poorly controlled GDM  - h/o PPH, for T&C at time of delivery  - h/o shoulder dystocia, LGA fetus. Patient previously counseled on risk of repeat shoulder dystocia, desires vaginal delivery   -pt history of cold sores, for SSE at time of IOL    Routine:  - cephalic 3/7  - GBS neg   - s/p TDAP and Flu   - Rubella, non-immune, for MMR postpartum   - BCM: s/p bilateral salpingectomy prior to this pregnancy     Dispo: inpatient management for poorly controlled GDM until delivery at 36wga     Pt to seen and discussed with MFM Attending, .     Nette Barrow MD , PGY-2   MFM  Pager [phone]     Principal Problem:    Gestational diabetes

## 2024-03-07 NOTE — H&P
Obstetrical Admission History and Physical     Traniekaylee Granger is a 23 y.o.  at 35w0d by 8wk US presents for r/o DKA and r/o sPEC.     Chief Complaint: elevated BG and BP in office    Assessment/Plan    GDM, insulin controlled, c/f DKA  -  in office, 176 on arrival   - Udip with 1+ ketones, 3+ glucose, and elevated SG  - Has not had any insulin since last discharge. Will give Lantus 10U + 4U Lispro SSI now  - 2L LR given in triage -> mIVF 0.45% NS at 125   - DKA labs:   BHB 0.26   Bicarb: 17.7   AG: 15   pH (venous): 7.38   K: 4.1, replete as needed  - Slight acidosis (goal >7.40) and anion gap, however BG decreased 176 >94 with insulin administration as above. Plan to repeat labs to assess for need for insulin gtt.   - Admit until delivery at 36wga for blood sugar optimization in the setting of poorly controlled GDM. MFM following.   - Prior DM regimen: Lantus 10/16 -> transitioned to 35U at bedtime during prior admission in the setting of patient fear of needles with self administration. Will transition back to twice daily insulin while inpatient  - Prior regimen Lantus 10/16 ordered to start tomorrow  - MFM following    siPEC without severe features   - mild range in office, previously majority Bps normotensive, not on meds  - Bps cycling   - HELLP labs negative  - P:C 0.45, baseline 0.1  - h/o sPEC in prior pregnancies  - asymptomatic     Delivery planning  - h/o PPH, for T&C at time of delivery  - h/o shoulder dystocia, LGA fetus. Patient previously counseled on risk of repeat shoulder dystocia, desires vaginal delivery     Fetal status  - CEFM while on L&D, NST reactive  - GBS neg   - BMZ deferred in the setting of late  and poorly controlled GDM  - EFW on 3/1: 3285g (>99%), AC >99%  - PNL reviewed and UTD, s/p Tdap and flu vaccines   - pt history of cold sores, for SSE at time of IOL    Postpartum planning  - ppBC: s/p bilateral salpingectomy prior to this pregnancy  - feeding:  breast  - rubella non-immune, for MMR postpartum    Seen and discussed with Dr. Barrera and Dr. Savana Charles MD  PGY-2, Obstetrics and Gynecology      Principal Problem:    Gestational diabetes      Pregnancy Problems (from 10/19/23 to present)       Problem Noted Resolved    Labor and delivery indication for care or intervention 2/29/2024 by Cherry Flores MD No    Priority:  Medium      History of postpartum hemorrhage, currently pregnant 1/31/2024 by Renetta Telles MD No    Priority:  Medium      History of shoulder dystocia in prior pregnancy, currently pregnant 1/31/2024 by Renetta Telles MD No    Priority:  Medium      Overview Addendum 2/1/2024  1:55 PM by Renetta Telles MD     2021, 36.0wks, sPEC, A1DM   No residual deficits in that child, no shoulder in x2 35w deliveries (P1 and P3)         Insulin controlled gestational diabetes mellitus (GDM) in third trimester 1/26/2024 by Renetta Baldwin RN No    Priority:  Medium      Overview Addendum 3/6/2024  9:00 AM by Karlene Smiley MD     By 1-hour 244    [x] MFM Consult/education  [ ] Serial growth ultrasounds   2/1 Growth US: EFW 1635g 27%, AC 11%. AFV normal    Fetal surveillance:  [x] Weekly at 32 weeks  [] Twice weekly at 36 weeks      2/5/2024 Begin NPH 10/16  2/8/2024: Switched to Lantus 10/16  3/1/2024: Lantus 34 nightly         35 weeks gestation of pregnancy 1/16/2024 by Brisa Hardy, PRASANTH-CNP No    Priority:  Medium      Overview Addendum 3/7/2024 11:19 AM by Abdon Rothman MD     Dating:   [x] Initial BMI: 35  [x] Prenatal Labs: reviewed and wnl (11/2)  [x] Aneuploidy Screening: RR First Check- normal quad screen (after corrected for GA)  [x] Baby ASA: compliant  [x] Anatomy US: wnl   [x] 1hr GCT at 24-28wks: 1-hr 244, GDM   [x] Tdap (27-36wks): 2/8/2024   [x] Flu Shot: 9/2023  [x] COVID vaccine:  booster 2/1/24  [x] Rhogam (if Rh neg): Rh+ not indicated   [x] GBS at 36 wks: 2/22: GBS negative  [x] Breastfeeding: yes, wants  to buy hands free pump, declined sending rx  [x] PPBC: s/p BS (was pregnant right before tubal, had negative UPT prior)  [] Mode of delivery:  anticipate vaginal, had prior PPH with first child and Shoulder dystocia with P2, no residual deficits for that child         History of pre-eclampsia in prior pregnancy, currently pregnant 2024 by Brisa Hardy, APRN-CNP No    Priority:  Medium      Overview Addendum 2024  1:55 PM by Renetta Telles MD     On Asa ppx         Hypertension affecting pregnancy in third trimester 2023 by Dylon Song MD No    Priority:  Medium      Overview Addendum 3/7/2024 11:11 AM by Abdon Rothman MD     Not on meds, asa ppx  Baseline PEC labs wnl,  P:C 0.11  [ ] Serial growth: 3/1: 3285g (>99%ile), AC >99%ile           Pelvic pain affecting pregnancy in second trimester, antepartum 10/19/2023 by Zoila Wolf MD No    Priority:  Medium      Overview Signed 10/19/2023 12:53 PM by Zoila Wolf MD     - s/p ED visits x2, elevated WBC otherwise labs wnl  - CT unable to be completed to r/o appendicitis   - symptoms improved today          Depression affecting pregnancy in third trimester, antepartum 10/18/2023 by Anne Marie Vera No    Priority:  Medium      Overview Addendum 2024  1:55 PM by Renetta Telles MD     - on seroquel outside of pregnancy, not taking currently  Mood good                Subjective   Traniece is a  at 35w0d by 8wk US presenting from office for elevated blood sugar and elevated BP in the setting of poorly controlled GDM and gHTN.     Patient recently admitted  - 3/1 for ketosis without acidosis in the seytting of not taking insulin. Patient reports fear of needles and inability to self-administer insulin. Prior regimen Lantus 10/16, transitioned to 34U at bedtime for ease of administration. However, patient has not picked this up from the pharmacy due to insurance issues. She has not had insulin since discharge on 3/1.  in office  today, had eaten tacos 1 hour prior. Per MFM, patient to be admitted for BG management until delivery at 36wga in the setting of poorly controlled GDM and gHTN as below.     She is feeling well. Denies nausea/vomiting, or any other complaints. Denies ctx, LOF, VB, or decreased fetal movement.     Patient also with mild range blood pressure in office today, previously mostly normotensive. Denies HA, vision changes, CP, SOB, or RUQ pain.     Pregnancy notable for:   - GDM, poorly controlled  - gHTN, h/o sPEC in 3 prior pregnancies with  deliveries  - depression, on Seroquel prior to preg, no meds  - h/o PPH     Obstetrical History   OB History    Para Term  AB Living   5 3 0 3 1 3   SAB IAB Ectopic Multiple Live Births           3      # Outcome Date GA Lbr Marcel/2nd Weight Sex Delivery Anes PTL Lv   5 Current            4  2023 35w0d    Vag-Spont   [facility]      Complications: Preeclampsia, severe, unspecified trimester   3   36w0d    Vag-Spont   [facility]      Complications: Shoulder Dystocia, Preeclampsia, severe, unspecified trimester, Gestational diabetes, diet controlled   2   35w0d    Vag-Spont   [facility]      Complications: Hemorrhage, Preeclampsia,  premature rupture of membranes (PPROM) with unknown onset of labor   1 AB                Past Medical History  Past Medical History:   Diagnosis Date    Degenerative myopia, bilateral 2020    Bilateral degenerative progressive high myopia    Encounter for screening for infections with a predominantly sexual mode of transmission 2020    Routine screening for STI (sexually transmitted infection)    History of gestational diabetes 10/18/2023    For early 1-hr GTT    Lattice degeneration of retina, 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    Personal history of other infectious and parasitic diseases 2018    History of herpes labialis    Unspecified chorioretinal scars, bilateral  "02/04/2020    Chorioretinal scar, both eyes        Past Surgical History   Past Surgical History:   Procedure Laterality Date    OTHER SURGICAL HISTORY  08/31/2017    Surg Results Vision Left Eye Central As Expected       Social History  Social History     Tobacco Use    Smoking status: Never     Passive exposure: Never    Smokeless tobacco: Never   Substance Use Topics    Alcohol use: Not Currently     Substance and Sexual Activity   Drug Use Never       Allergies  Patient has no known allergies.     Medications  Medications Prior to Admission   Medication Sig Dispense Refill Last Dose    alcohol swabs pads, medicated Use 1, up to 5 times a day 200 each 3     aspirin 81 mg chewable tablet CHEW 2 TABLETS BY MOUTH ONCE DAILY 60 tablet 3     blood sugar diagnostic (Blood Glucose Test) strip Use 1 strip 4-6 times a day during pregnancy 150 each 3     blood sugar diagnostic (OneTouch Verio test strips) strip Use 1 strip 4-6 times per day to test blood sugar during pregnancy 150 each 3     blood-glucose meter (Accu-Chek Guide Glucose Meter) misc Use for testing blood sugar in pregnancy 1 each 0     blood-glucose meter misc Use for testing blood sugar fasting and 1 hour after each meal. 4-6 times per day during pregnancy 1 each 0     insulin glargine (Lantus) 100 unit/mL (3 mL) pen Inject 34 units before bed. 15 mL 3     lancets 33 gauge misc Use 1 lancet 4-6 times per day during pregnancy 200 each 3     lancets 33 gauge misc Use 1 lancet 4-6 times per day during pregnancy 200 each 3     magnesium oxide 500 mg tablet Take 1 tablet (500 mg) by mouth once daily. 60 tablet 1     metoclopramide (Reglan) 10 mg tablet Take 1 tablet (10 mg) by mouth every 8 hours if needed (headache). 12 tablet 0     miscellaneous medical supply (Blood Pressure Cuff) misc USE TO TEST BLOOD PRESSURE 1 each 0     pen needle, diabetic (Pen Needle) 32 gauge x 5/32\" needle Use 1 per injection, up to 5 times a day 200 each 3     prenatal vitamin, " iron-folic, 27 mg iron-800 mcg folic acid tablet TAKE 1 TABLET BY MOUTH ONCE DAILY 30 tablet 4     riboflavin (vitamin B2) 100 mg tablet tablet Take 1 tablet (100 mg) by mouth once daily. 60 tablet 0     SUMAtriptan (Imitrex) 50 mg tablet Take 1 tablet (50 mg) by mouth 1 time if needed for migraine for up to 1 dose. May repeat dose once in 2 hours if no relief.  Do not exceed 2 doses in 24 hours. 1 tablet 0        Objective    Last Vitals  Temp Pulse Resp BP MAP O2 Sat   36.2 °C (97.2 °F) 100 18 (!) 145/94   99 %     Physical Examination  General: Well appearing, alert  HEENT: normocephalic, EOMI, clear sclera  Cardio: Warm and well perfused, RRR  Resp: breathing comfortably on room air., CTAB  Abd: soft, gravid, nontender  Neuro: grossly intact, no focal deficits  Extremities: full ROM, no calf tenderness  Psych: A&O x3, appropriate mood and affect    Fetal Assessment  FHT: 140/mod/+accel/-decel   Sallis: irregular ctx     Lab Review  Lab Results   Component Value Date    WBC 8.5 03/07/2024    HGB 12.0 03/07/2024    HCT 38.4 03/07/2024     03/07/2024     Lab Results   Component Value Date    GLUCOSE 165 (H) 03/07/2024     (L) 03/07/2024    K 4.1 03/07/2024     03/07/2024    CO2 17 (L) 03/07/2024    ANIONGAP 15 03/07/2024    BUN 9 03/07/2024    CREATININE 0.41 (L) 03/07/2024    EGFR >90 03/07/2024    CALCIUM 9.0 03/07/2024    ALBUMIN 3.2 (L) 03/07/2024    PROT 5.8 (L) 03/07/2024    ALKPHOS 173 (H) 03/07/2024    ALT 4 (L) 03/07/2024    AST 10 03/07/2024    BILITOT 0.2 03/07/2024     Lab Results   Component Value Date    UTPCR 0.45 (H) 03/07/2024     BHB 0.26   Latest Reference Range & Units 03/07/24 12:14   POCT pH, Venous 7.33 - 7.43 pH 7.38   POCT pCO2, Venous 41 - 51 mm Hg 30 (L)   POCT pO2, Venous 35 - 45 mm Hg 95 (H)   POCT SO2, Venous 45 - 75 % 99 (H)   POCT Oxy Hemoglobin, Venous 45.0 - 75.0 % 96.3 (H)   POCT Hematocrit Calculated, Venous 36.0 - 46.0 % 33.0 (L)   POCT Sodium, Venous 136 - 145  mmol/L 134 (L)   POCT Potassium, Venous 3.5 - 5.3 mmol/L 3.8   POCT Chloride, Venous 98 - 107 mmol/L 105   POCT Ionized Calicum, Venous 1.10 - 1.33 mmol/L 1.20   POCT Glucose, Venous 74 - 99 mg/dL 161 (H)   POCT Lactate, Venous 0.4 - 2.0 mmol/L 1.8   POCT Base Excess, Venous -2.0 - 3.0 mmol/L -6.4 (L)   POCT HCO3 Calculated, Venous 22.0 - 26.0 mmol/L 17.7 (L)   POCT Hemoglobin, Venous 12.0 - 16.0 g/dL 11.0 (L)   POCT Anion Gap, Venous 10.0 - 25.0 mmol/L 15.0   (L): Data is abnormally low  (H): Data is abnormally high

## 2024-03-07 NOTE — ASSESSMENT & PLAN NOTE
POCT  today: 246  Patient is not taking insulin citing insurance reasons and not able to pick it up. Discussed the risk of stillbirth again. Given inability to comply with recommendations for management for A2DM and significant concern for stillbirth recommend presenting to the hospital. We discussed that our goal would be to manage her inpatient and plan for delivery at 36-37 weeks and if she is not able to stay in the hospital we would recommend moving towards delivery. Patient is amenable to staying in the hospital and being managed inpatient for her GDM but would like to be delivered at 36 weeks which is reasonable. We discussed that given her new elevation in BP an earlier delivery may be indicated which she understands and accepts

## 2024-03-07 NOTE — ASSESSMENT & PLAN NOTE
Taking AASA  BP: 136/90 repeat 157/97  Denies any symptoms however prior deliveries have been for preE with SF. Discussed possible early delivery is severe features are diagnosed

## 2024-03-07 NOTE — ASSESSMENT & PLAN NOTE
2021, 36.0wks, sPEC, A1DM   No residual deficits in that child, no shoulder in x2 35w deliveries (P1 and P3)

## 2024-03-07 NOTE — SIGNIFICANT EVENT
Scooter is a  at 35w0d by 8wk US presenting from office for elevated blood sugar and elevated BP in the setting of poorly controlled GDM and gHTN.     GDM, insulin controlled, c/f DKA  -  in office, 176 on arrival. Now s/p 10u Lantus and 2u Lispro SSI   - 2L LR ordered and currently infusing   - DKA labs:              BHB 0.26 > 0.64              Bicarb: 17.7 > 20              AG: 15 > 13              pH (venous): 7.38 > 7.42              K: 4.1 > 3.7  - Previously planning to start insulin gtt but given resolution of acidosis, improvement in blood sugars and clinical stability, will continue with insulin regimen of Lantus 10/16 (AM/PM) and SSI with meals and transfer to antepartum unit   - Admit until delivery at 36wga for blood sugar optimization in the setting of poorly controlled GDM unless contraindication for expected management develops before then     Niko Santos MD

## 2024-03-08 LAB
ALBUMIN SERPL BCP-MCNC: 2.8 G/DL (ref 3.4–5)
ALP SERPL-CCNC: 142 U/L (ref 33–110)
ALT SERPL W P-5'-P-CCNC: 3 U/L (ref 7–45)
ANION GAP SERPL CALC-SCNC: 13 MMOL/L (ref 10–20)
AST SERPL W P-5'-P-CCNC: 9 U/L (ref 9–39)
B-OH-BUTYR SERPL-SCNC: 0.34 MMOL/L (ref 0.02–0.27)
BASOPHILS # BLD AUTO: 0.03 X10*3/UL (ref 0–0.1)
BASOPHILS NFR BLD AUTO: 0.4 %
BILIRUB SERPL-MCNC: 0.3 MG/DL (ref 0–1.2)
BUN SERPL-MCNC: 6 MG/DL (ref 6–23)
CALCIUM SERPL-MCNC: 8.3 MG/DL (ref 8.6–10.6)
CHLORIDE SERPL-SCNC: 109 MMOL/L (ref 98–107)
CO2 SERPL-SCNC: 20 MMOL/L (ref 21–32)
CREAT SERPL-MCNC: 0.37 MG/DL (ref 0.5–1.05)
EGFRCR SERPLBLD CKD-EPI 2021: >90 ML/MIN/1.73M*2
EOSINOPHIL # BLD AUTO: 0.19 X10*3/UL (ref 0–0.7)
EOSINOPHIL NFR BLD AUTO: 2.5 %
ERYTHROCYTE [DISTWIDTH] IN BLOOD BY AUTOMATED COUNT: 14.3 % (ref 11.5–14.5)
GLUCOSE BLD MANUAL STRIP-MCNC: 136 MG/DL (ref 74–99)
GLUCOSE BLD MANUAL STRIP-MCNC: 191 MG/DL (ref 74–99)
GLUCOSE BLD MANUAL STRIP-MCNC: 194 MG/DL (ref 74–99)
GLUCOSE BLD MANUAL STRIP-MCNC: 274 MG/DL (ref 74–99)
GLUCOSE SERPL-MCNC: 124 MG/DL (ref 74–99)
HCT VFR BLD AUTO: 32.8 % (ref 36–46)
HGB BLD-MCNC: 10.7 G/DL (ref 12–16)
IMM GRANULOCYTES # BLD AUTO: 0.04 X10*3/UL (ref 0–0.7)
IMM GRANULOCYTES NFR BLD AUTO: 0.5 % (ref 0–0.9)
LYMPHOCYTES # BLD AUTO: 1.76 X10*3/UL (ref 1.2–4.8)
LYMPHOCYTES NFR BLD AUTO: 23.1 %
MAGNESIUM SERPL-MCNC: 1.63 MG/DL (ref 1.6–2.4)
MCH RBC QN AUTO: 26.7 PG (ref 26–34)
MCHC RBC AUTO-ENTMCNC: 32.6 G/DL (ref 32–36)
MCV RBC AUTO: 82 FL (ref 80–100)
MONOCYTES # BLD AUTO: 0.73 X10*3/UL (ref 0.1–1)
MONOCYTES NFR BLD AUTO: 9.6 %
NEUTROPHILS # BLD AUTO: 4.86 X10*3/UL (ref 1.2–7.7)
NEUTROPHILS NFR BLD AUTO: 63.9 %
NRBC BLD-RTO: 0 /100 WBCS (ref 0–0)
PLATELET # BLD AUTO: 223 X10*3/UL (ref 150–450)
POTASSIUM SERPL-SCNC: 3.6 MMOL/L (ref 3.5–5.3)
PROT SERPL-MCNC: 5.4 G/DL (ref 6.4–8.2)
RBC # BLD AUTO: 4.01 X10*6/UL (ref 4–5.2)
SODIUM SERPL-SCNC: 138 MMOL/L (ref 136–145)
WBC # BLD AUTO: 7.6 X10*3/UL (ref 4.4–11.3)

## 2024-03-08 PROCEDURE — 82947 ASSAY GLUCOSE BLOOD QUANT: CPT

## 2024-03-08 PROCEDURE — 2500000002 HC RX 250 W HCPCS SELF ADMINISTERED DRUGS (ALT 637 FOR MEDICARE OP, ALT 636 FOR OP/ED): Performed by: STUDENT IN AN ORGANIZED HEALTH CARE EDUCATION/TRAINING PROGRAM

## 2024-03-08 PROCEDURE — 85025 COMPLETE CBC W/AUTO DIFF WBC: CPT

## 2024-03-08 PROCEDURE — 36415 COLL VENOUS BLD VENIPUNCTURE: CPT

## 2024-03-08 PROCEDURE — 80053 COMPREHEN METABOLIC PANEL: CPT

## 2024-03-08 PROCEDURE — 82010 KETONE BODYS QUAN: CPT

## 2024-03-08 PROCEDURE — 83735 ASSAY OF MAGNESIUM: CPT

## 2024-03-08 PROCEDURE — 1210000001 HC SEMI-PRIVATE ROOM DAILY

## 2024-03-08 PROCEDURE — 2500000002 HC RX 250 W HCPCS SELF ADMINISTERED DRUGS (ALT 637 FOR MEDICARE OP, ALT 636 FOR OP/ED)

## 2024-03-08 RX ORDER — MAGNESIUM SULFATE HEPTAHYDRATE 40 MG/ML
4 INJECTION, SOLUTION INTRAVENOUS ONCE
Status: DISCONTINUED | OUTPATIENT
Start: 2024-03-08 | End: 2024-03-15

## 2024-03-08 RX ORDER — INSULIN LISPRO 100 [IU]/ML
4 INJECTION, SOLUTION INTRAVENOUS; SUBCUTANEOUS
Status: DISCONTINUED | OUTPATIENT
Start: 2024-03-08 | End: 2024-03-09

## 2024-03-08 RX ADMIN — INSULIN LISPRO 2 UNITS: 100 INJECTION, SOLUTION INTRAVENOUS; SUBCUTANEOUS at 21:05

## 2024-03-08 RX ADMIN — INSULIN LISPRO 4 UNITS: 100 INJECTION, SOLUTION INTRAVENOUS; SUBCUTANEOUS at 21:07

## 2024-03-08 RX ADMIN — INSULIN GLARGINE 16 UNITS: 100 INJECTION, SOLUTION SUBCUTANEOUS at 21:06

## 2024-03-08 RX ADMIN — INSULIN GLARGINE 10 UNITS: 100 INJECTION, SOLUTION SUBCUTANEOUS at 06:30

## 2024-03-08 RX ADMIN — INSULIN LISPRO 6 UNITS: 100 INJECTION, SOLUTION INTRAVENOUS; SUBCUTANEOUS at 12:25

## 2024-03-08 RX ADMIN — INSULIN LISPRO 2 UNITS: 100 INJECTION, SOLUTION INTRAVENOUS; SUBCUTANEOUS at 17:27

## 2024-03-08 ASSESSMENT — PAIN DESCRIPTION - DESCRIPTORS
DESCRIPTORS: HEADACHE
DESCRIPTORS: PRESSURE

## 2024-03-08 ASSESSMENT — PAIN - FUNCTIONAL ASSESSMENT
PAIN_FUNCTIONAL_ASSESSMENT: 0-10

## 2024-03-08 ASSESSMENT — PAIN SCALES - GENERAL
PAINLEVEL_OUTOF10: 0 - NO PAIN
PAINLEVEL_OUTOF10: 0 - NO PAIN
PAINLEVEL_OUTOF10: 2
PAINLEVEL_OUTOF10: 7
PAINLEVEL_OUTOF10: 0 - NO PAIN

## 2024-03-08 NOTE — CARE PLAN
The patient's goals for the shift include  to get sleep    The clinical goals for the shift include maternal BP remain <160/110. FHR remains reasurring

## 2024-03-08 NOTE — SIGNIFICANT EVENT
BP Cuff and Home Monitoring  Patient meets criteria for home monitoring of blood pressure post discharge.  Reason: SiPec without SF. Met with patient to assess for availability of home BP monitor.  Patient stated she owns home BP monitor. . Patient educated on importance of continuing to monitor BP at home, recording BP on home monitoring log and s/sx of when to call her provider.  Pt verbalized understanding the above information.

## 2024-03-08 NOTE — PROGRESS NOTES
".ANTEPARTUM PROGRESS NOTE   3/8/2024, 7:21 AM     SUBJECTIVE:  Had 10/10 HA overnight that was treated, currently resolved. Denies change inv vision, SOB, CP and RUQ pain. Didn't eat dinner last night because it tasted bad and made her feel nauseous, has been drinking water and juice.     OBJECTIVE:   /77 (Patient Position: Lying)   Pulse 98   Temp 36.1 °C (97 °F) (Temporal)   Resp 18   Ht 1.651 m (5' 5\")   Wt 101 kg (223 lb 1.7 oz)   SpO2 97%   BMI 37.13 kg/m²    Temp  Min: 36.1 °C (97 °F)  Max: 36.5 °C (97.7 °F)  Pulse  Min: 86  Max: 112  BP  Min: 117/77  Max: 145/94    General:  AAOx3, No acute distress  Cardiovascular: Warm and well perfused  Respiratory: Normal respiratory effort   Abdominal:  Soft, gravid, non-tender, no rebound or guarding, no palpable contractions   Extremities: Warm, well perfused, no edema, no calf tenderness   Pelvic: deferred    NST: Baseline 135/ + accels/ - decels/ moderate sheldon  [place]: no ctx     Labs:   .  Results for orders placed or performed during the hospital encounter of 03/07/24 (from the past 24 hour(s))   POCT GLUCOSE   Result Value Ref Range    POCT Glucose 176 (H) 74 - 99 mg/dL   Comprehensive metabolic panel   Result Value Ref Range    Glucose 165 (H) 74 - 99 mg/dL    Sodium 135 (L) 136 - 145 mmol/L    Potassium 4.1 3.5 - 5.3 mmol/L    Chloride 107 98 - 107 mmol/L    Bicarbonate 17 (L) 21 - 32 mmol/L    Anion Gap 15 10 - 20 mmol/L    Urea Nitrogen 9 6 - 23 mg/dL    Creatinine 0.41 (L) 0.50 - 1.05 mg/dL    eGFR >90 >60 mL/min/1.73m*2    Calcium 9.0 8.6 - 10.6 mg/dL    Albumin 3.2 (L) 3.4 - 5.0 g/dL    Alkaline Phosphatase 173 (H) 33 - 110 U/L    Total Protein 5.8 (L) 6.4 - 8.2 g/dL    AST 10 9 - 39 U/L    Bilirubin, Total 0.2 0.0 - 1.2 mg/dL    ALT 4 (L) 7 - 45 U/L   CBC   Result Value Ref Range    WBC 8.5 4.4 - 11.3 x10*3/uL    nRBC 0.0 0.0 - 0.0 /100 WBCs    RBC 4.50 4.00 - 5.20 x10*6/uL    Hemoglobin 12.0 12.0 - 16.0 g/dL    Hematocrit 38.4 36.0 - 46.0 %    " MCV 85 80 - 100 fL    MCH 26.7 26.0 - 34.0 pg    MCHC 31.3 (L) 32.0 - 36.0 g/dL    RDW 14.5 11.5 - 14.5 %    Platelets 246 150 - 450 x10*3/uL   Protein, Urine Random   Result Value Ref Range    Total Protein, Urine Random 39 (H) 5 - 24 mg/dL    Creatinine, Urine Random 87.5 20.0 - 320.0 mg/dL    T. Protein/Creatinine Ratio 0.45 (H) 0.00 - 0.17 mg/mg Creat   Type And Screen   Result Value Ref Range    ABO TYPE O     Rh TYPE POS     ANTIBODY SCREEN NEG    Syphilis Screen with Reflex   Result Value Ref Range    Syphilis Total Ab Nonreactive Nonreactive   Beta Hydroxybutyrate   Result Value Ref Range    Beta-Hydroxybutyrate 0.26 0.02 - 0.27 mmol/L   BLOOD GAS VENOUS FULL PANEL   Result Value Ref Range    POCT pH, Venous 7.38 7.33 - 7.43 pH    POCT pCO2, Venous 30 (L) 41 - 51 mm Hg    POCT pO2, Venous 95 (H) 35 - 45 mm Hg    POCT SO2, Venous 99 (H) 45 - 75 %    POCT Oxy Hemoglobin, Venous 96.3 (H) 45.0 - 75.0 %    POCT Hematocrit Calculated, Venous 33.0 (L) 36.0 - 46.0 %    POCT Sodium, Venous 134 (L) 136 - 145 mmol/L    POCT Potassium, Venous 3.8 3.5 - 5.3 mmol/L    POCT Chloride, Venous 105 98 - 107 mmol/L    POCT Ionized Calicum, Venous 1.20 1.10 - 1.33 mmol/L    POCT Glucose, Venous 161 (H) 74 - 99 mg/dL    POCT Lactate, Venous 1.8 0.4 - 2.0 mmol/L    POCT Base Excess, Venous -6.4 (L) -2.0 - 3.0 mmol/L    POCT HCO3 Calculated, Venous 17.7 (L) 22.0 - 26.0 mmol/L    POCT Hemoglobin, Venous 11.0 (L) 12.0 - 16.0 g/dL    POCT Anion Gap, Venous 15.0 10.0 - 25.0 mmol/L    Patient Temperature 37.0 degrees Celsius    FiO2 21 %   POCT urinalysis dipstick manually resulted   Result Value Ref Range    Color, UA Yellow     Clarity, UA Clear     Glucose, UA (3+) Moderate     Bilirubin, UA NEGATIVE     Ketones, UA Positive     Spec Grav, UA 1.030     Blood, UA Positive     pH, UA 6.0     Protein, UA (1+) Rare     Urobilinogen, UA 0.2     Leukocytes, UA NEGATIVE     Nitrite, UA NEGATIVE     Appearance, Fluid Clear    POCT GLUCOSE    Result Value Ref Range    POCT Glucose 94 74 - 99 mg/dL   POCT GLUCOSE   Result Value Ref Range    POCT Glucose 96 74 - 99 mg/dL   Basic metabolic panel   Result Value Ref Range    Glucose 90 74 - 99 mg/dL    Sodium 137 136 - 145 mmol/L    Potassium 3.7 3.5 - 5.3 mmol/L    Chloride 108 (H) 98 - 107 mmol/L    Bicarbonate 20 (L) 21 - 32 mmol/L    Anion Gap 13 10 - 20 mmol/L    Urea Nitrogen 7 6 - 23 mg/dL    Creatinine 0.36 (L) 0.50 - 1.05 mg/dL    eGFR >90 >60 mL/min/1.73m*2    Calcium 8.8 8.6 - 10.6 mg/dL   Beta Hydroxybutyrate   Result Value Ref Range    Beta-Hydroxybutyrate 0.64 (H) 0.02 - 0.27 mmol/L   Blood Gas Venous   Result Value Ref Range    POCT pH, Venous 7.42 7.33 - 7.43 pH    POCT pCO2, Venous 28 (L) 41 - 51 mm Hg    POCT pO2, Venous 91 (H) 35 - 45 mm Hg    POCT SO2, Venous 98 (H) 45 - 75 %    POCT Oxy Hemoglobin, Venous 96.0 (H) 45.0 - 75.0 %    POCT Base Excess, Venous -4.9 (L) -2.0 - 3.0 mmol/L    POCT HCO3 Calculated, Venous 18.2 (L) 22.0 - 26.0 mmol/L    Patient Temperature 37.0 degrees Celsius    FiO2 21 %   Magnesium   Result Value Ref Range    Magnesium 1.57 (L) 1.60 - 2.40 mg/dL   POCT GLUCOSE   Result Value Ref Range    POCT Glucose 88 74 - 99 mg/dL   POCT GLUCOSE   Result Value Ref Range    POCT Glucose 197 (H) 74 - 99 mg/dL   POCT GLUCOSE   Result Value Ref Range    POCT Glucose 136 (H) 74 - 99 mg/dL       ASSESSMENT AND PLAN:     Scooter is a  at 35w0d by 8wk US presenting from office for elevated blood sugar and elevated BP in the setting of poorly controlled GDM and gHTN.      GDM, insulin controlled, c/f DKA  - BG overnight 197, 2u Lispro SSI given -> 137  - Udip with 1+ ketones, 3+ glucose, and elevated SG on admission  - s/p 2L LR given in triage -> continue mIVF 0.45% NS at 125   - DKA labs:              BHB 0.26 -> 0.64              Bicarb: 17.7 -> 20               AG: 15 -> 13              pH (venous): 7.38 -> 7.42               K: 4.1, replete as needed  - Slight anion gap  on admission, now resolved after fluid and insulin administration. Increasing BHB possibly 2/2 dehydration vs DKA given non-acidotic state. Will defer insulin gtt at this time, due to lower suspicion of DKA  - AM BHB, CBC+diff, CMP, Mg pending.   - Continue Lantus 10u/16u. POCT Fasting and 1hr post-prandial POCT and SSI w/meals   - Planning for delivery at 36 wga in s/o poorly controlled GDM or sooner if expectant management is contraindicated     siPEC without severe features   - mild range in office, previously majority Bps normotensive, not on meds  - HELLP labs negative. AM HELLP labs pending   - P:C 0.45, baseline 0.1  - h/o sPEC in prior pregnancies  - 10/10 HA overnight, tx with Tylenol, Reglan, Benadryl, Flexeril, currently resolved.      Fetal status:  -Reassuring, NST reactive   - continue daily NSTs while inpatient   -  EFW on 3/1: 3285g (>99%), AC >99%  - BMZ deferred in the setting of late  and poorly controlled GDM  - h/o PPH, for T&C at time of delivery  - h/o shoulder dystocia, LGA fetus. Patient previously counseled on risk of repeat shoulder dystocia, desires vaginal delivery   -pt history of cold sores, for SSE at time of IOL     Routine:  - cephalic 3/7  - GBS neg   - s/p TDAP and Flu   - Rubella, non-immune, for MMR postpartum   - BCM: s/p bilateral salpingectomy prior to this pregnancy      Dispo: inpatient management for poorly controlled GDM until delivery at 36wga      Pt to seen and discussed with MFM Attending, .      Nette Barrow MD , PGY-2   MFM  Pager [phone]     Principal Problem:    Gestational diabetes

## 2024-03-08 NOTE — CARE PLAN
The patient's goals for the shift include decreasing blood sugars    The clinical goals for the shift include normal blood pressures<160/110    Over the shift, the patient did make progress toward the following goals. Barriers to progression include none. Recommendations to address these barriers include encouraging  her to eat on a consistant schedule and to notify nursing of any new symptoms..

## 2024-03-09 LAB
ALBUMIN SERPL BCP-MCNC: 2.9 G/DL (ref 3.4–5)
ALP SERPL-CCNC: 148 U/L (ref 33–110)
ALT SERPL W P-5'-P-CCNC: 4 U/L (ref 7–45)
ANION GAP SERPL CALC-SCNC: 16 MMOL/L (ref 10–20)
AST SERPL W P-5'-P-CCNC: 8 U/L (ref 9–39)
B-OH-BUTYR SERPL-SCNC: 0.2 MMOL/L (ref 0.02–0.27)
BASOPHILS # BLD AUTO: 0.03 X10*3/UL (ref 0–0.1)
BASOPHILS NFR BLD AUTO: 0.4 %
BILIRUB SERPL-MCNC: 0.3 MG/DL (ref 0–1.2)
BILIRUBIN, POC: ABNORMAL
BLOOD URINE, POC: ABNORMAL
BUN SERPL-MCNC: 5 MG/DL (ref 6–23)
CALCIUM SERPL-MCNC: 8.4 MG/DL (ref 8.6–10.6)
CHLORIDE SERPL-SCNC: 107 MMOL/L (ref 98–107)
CLARITY, POC: ABNORMAL
CO2 SERPL-SCNC: 17 MMOL/L (ref 21–32)
COLOR, POC: ABNORMAL
CREAT SERPL-MCNC: 0.39 MG/DL (ref 0.5–1.05)
EGFRCR SERPLBLD CKD-EPI 2021: >90 ML/MIN/1.73M*2
EOSINOPHIL # BLD AUTO: 0.2 X10*3/UL (ref 0–0.7)
EOSINOPHIL NFR BLD AUTO: 2.4 %
ERYTHROCYTE [DISTWIDTH] IN BLOOD BY AUTOMATED COUNT: 14.4 % (ref 11.5–14.5)
GLUCOSE BLD MANUAL STRIP-MCNC: 146 MG/DL (ref 74–99)
GLUCOSE BLD MANUAL STRIP-MCNC: 150 MG/DL (ref 74–99)
GLUCOSE BLD MANUAL STRIP-MCNC: 175 MG/DL (ref 74–99)
GLUCOSE BLD MANUAL STRIP-MCNC: 211 MG/DL (ref 74–99)
GLUCOSE BLD MANUAL STRIP-MCNC: 229 MG/DL (ref 74–99)
GLUCOSE SERPL-MCNC: 164 MG/DL (ref 74–99)
GLUCOSE URINE, POC: ABNORMAL
HCT VFR BLD AUTO: 32.1 % (ref 36–46)
HGB BLD-MCNC: 10.8 G/DL (ref 12–16)
IMM GRANULOCYTES # BLD AUTO: 0.08 X10*3/UL (ref 0–0.7)
IMM GRANULOCYTES NFR BLD AUTO: 1 % (ref 0–0.9)
KETONES, POC: ABNORMAL
LEUKOCYTE EST, POC: ABNORMAL
LYMPHOCYTES # BLD AUTO: 2.26 X10*3/UL (ref 1.2–4.8)
LYMPHOCYTES NFR BLD AUTO: 27.7 %
MAGNESIUM SERPL-MCNC: 1.66 MG/DL (ref 1.6–2.4)
MCH RBC QN AUTO: 26.7 PG (ref 26–34)
MCHC RBC AUTO-ENTMCNC: 33.6 G/DL (ref 32–36)
MCV RBC AUTO: 79 FL (ref 80–100)
MONOCYTES # BLD AUTO: 0.99 X10*3/UL (ref 0.1–1)
MONOCYTES NFR BLD AUTO: 12.1 %
NEUTROPHILS # BLD AUTO: 4.61 X10*3/UL (ref 1.2–7.7)
NEUTROPHILS NFR BLD AUTO: 56.4 %
NITRITE, POC: ABNORMAL
NRBC BLD-RTO: 0 /100 WBCS (ref 0–0)
PH, POC: ABNORMAL
PLATELET # BLD AUTO: 219 X10*3/UL (ref 150–450)
POTASSIUM SERPL-SCNC: 3.5 MMOL/L (ref 3.5–5.3)
PROT SERPL-MCNC: 5.5 G/DL (ref 6.4–8.2)
RBC # BLD AUTO: 4.05 X10*6/UL (ref 4–5.2)
SODIUM SERPL-SCNC: 136 MMOL/L (ref 136–145)
SPECIFIC GRAVITY, POC: ABNORMAL
URINE PROTEIN, POC: ABNORMAL
UROBILINOGEN, POC: ABNORMAL
WBC # BLD AUTO: 8.2 X10*3/UL (ref 4.4–11.3)

## 2024-03-09 PROCEDURE — 99233 SBSQ HOSP IP/OBS HIGH 50: CPT | Performed by: STUDENT IN AN ORGANIZED HEALTH CARE EDUCATION/TRAINING PROGRAM

## 2024-03-09 PROCEDURE — 59025 FETAL NON-STRESS TEST: CPT | Mod: GC | Performed by: STUDENT IN AN ORGANIZED HEALTH CARE EDUCATION/TRAINING PROGRAM

## 2024-03-09 PROCEDURE — 85025 COMPLETE CBC W/AUTO DIFF WBC: CPT

## 2024-03-09 PROCEDURE — 83735 ASSAY OF MAGNESIUM: CPT

## 2024-03-09 PROCEDURE — 1210000001 HC SEMI-PRIVATE ROOM DAILY

## 2024-03-09 PROCEDURE — 59025 FETAL NON-STRESS TEST: CPT | Performed by: STUDENT IN AN ORGANIZED HEALTH CARE EDUCATION/TRAINING PROGRAM

## 2024-03-09 PROCEDURE — 82010 KETONE BODYS QUAN: CPT

## 2024-03-09 PROCEDURE — 80053 COMPREHEN METABOLIC PANEL: CPT

## 2024-03-09 PROCEDURE — 81002 URINALYSIS NONAUTO W/O SCOPE: CPT | Performed by: STUDENT IN AN ORGANIZED HEALTH CARE EDUCATION/TRAINING PROGRAM

## 2024-03-09 PROCEDURE — 2500000001 HC RX 250 WO HCPCS SELF ADMINISTERED DRUGS (ALT 637 FOR MEDICARE OP)

## 2024-03-09 PROCEDURE — 36415 COLL VENOUS BLD VENIPUNCTURE: CPT

## 2024-03-09 PROCEDURE — 82947 ASSAY GLUCOSE BLOOD QUANT: CPT

## 2024-03-09 RX ORDER — INSULIN LISPRO 100 [IU]/ML
6 INJECTION, SOLUTION INTRAVENOUS; SUBCUTANEOUS
Status: DISCONTINUED | OUTPATIENT
Start: 2024-03-09 | End: 2024-03-10

## 2024-03-09 RX ORDER — INSULIN GLARGINE 100 [IU]/ML
12 INJECTION, SOLUTION SUBCUTANEOUS
Status: DISCONTINUED | OUTPATIENT
Start: 2024-03-10 | End: 2024-03-09

## 2024-03-09 RX ORDER — INSULIN GLARGINE 100 [IU]/ML
12 INJECTION, SOLUTION SUBCUTANEOUS
Status: DISCONTINUED | OUTPATIENT
Start: 2024-03-09 | End: 2024-03-10

## 2024-03-09 RX ORDER — INSULIN GLARGINE 100 [IU]/ML
20 INJECTION, SOLUTION SUBCUTANEOUS NIGHTLY
Status: DISCONTINUED | OUTPATIENT
Start: 2024-03-09 | End: 2024-03-10

## 2024-03-09 RX ADMIN — INSULIN LISPRO 4 UNITS: 100 INJECTION, SOLUTION INTRAVENOUS; SUBCUTANEOUS at 13:16

## 2024-03-09 RX ADMIN — INSULIN LISPRO 4 UNITS: 100 INJECTION, SOLUTION INTRAVENOUS; SUBCUTANEOUS at 17:44

## 2024-03-09 RX ADMIN — INSULIN LISPRO 6 UNITS: 100 INJECTION, SOLUTION INTRAVENOUS; SUBCUTANEOUS at 16:34

## 2024-03-09 RX ADMIN — INSULIN LISPRO 6 UNITS: 100 INJECTION, SOLUTION INTRAVENOUS; SUBCUTANEOUS at 21:18

## 2024-03-09 RX ADMIN — INSULIN GLARGINE 10 UNITS: 100 INJECTION, SOLUTION SUBCUTANEOUS at 10:09

## 2024-03-09 RX ADMIN — INSULIN GLARGINE 20 UNITS: 100 INJECTION, SOLUTION SUBCUTANEOUS at 21:18

## 2024-03-09 RX ADMIN — PRENATAL VIT W/ FE FUMARATE-FA TAB 27-0.8 MG 1 TABLET: 27-0.8 TAB at 08:57

## 2024-03-09 RX ADMIN — INSULIN LISPRO 6 UNITS: 100 INJECTION, SOLUTION INTRAVENOUS; SUBCUTANEOUS at 12:21

## 2024-03-09 RX ADMIN — INSULIN LISPRO 2 UNITS: 100 INJECTION, SOLUTION INTRAVENOUS; SUBCUTANEOUS at 22:41

## 2024-03-09 ASSESSMENT — PAIN - FUNCTIONAL ASSESSMENT
PAIN_FUNCTIONAL_ASSESSMENT: 0-10

## 2024-03-09 ASSESSMENT — PAIN SCALES - GENERAL
PAINLEVEL_OUTOF10: 0 - NO PAIN
PAINLEVEL_OUTOF10: 7
PAINLEVEL_OUTOF10: 0 - NO PAIN

## 2024-03-09 ASSESSMENT — PAIN DESCRIPTION - DESCRIPTORS: DESCRIPTORS: PRESSURE

## 2024-03-09 NOTE — CARE PLAN
Problem: Antepartum  Goal: Maintain pregnancy as long as maternal and/or fetal condition is stable  Outcome: Progressing  Goal: Avoid/minimize constipation  Outcome: Progressing  Goal: No decrease in circulation/VTE  Outcome: Progressing  Goal: FHR remains reassuring  Outcome: Progressing  Goal: Minimize anxiety/maximize coping  Outcome: Progressing     Problem: Hypertensive Disorder of Pregnancy (HDP)  Goal: Minimal s/sx of HDP and BP<160/110  Outcome: Progressing  Goal: Adequate urine output (0.5 ml/kg/hr)  Outcome: Progressing     Problem: Pain - Adult  Goal: Verbalizes/displays adequate comfort level or baseline comfort level  Outcome: Progressing     Problem: Safety - Adult  Goal: Free from fall injury  Outcome: Progressing   The patient's goals for the shift include sleep more.    The clinical goals for the shift include BP.'s will be less than 160/110.    VSS and and NST was reactive this am.Pt. has good FM.Her BS are elevated and she needed extra insulin coverage after her meals.She denies s/sx of PEC.Stable.

## 2024-03-09 NOTE — PROGRESS NOTES
"ANTEPARTUM PROGRESS NOTE   3/9/2024, 6:37 AM     SUBJECTIVE:  Ctx overnight, exam unchanged from prior. Currently no VB, LOF, ctx. Good FM. No urinary or GI sx. No HA, vision changes, SOB, CP, RUQ pain, LE edema      OBJECTIVE:   BP (!) 152/104   Pulse 104   Temp 36.8 °C (98.2 °F) (Temporal)   Resp 18   Ht 1.651 m (5' 5\")   Wt 101 kg (223 lb 1.7 oz)   SpO2 98%   BMI 37.13 kg/m²    Temp  Min: 36 °C (96.8 °F)  Max: 36.8 °C (98.2 °F)  Pulse  Min: 96  Max: 107  BP  Min: 109/69  Max: 152/104    General: AAOx3, No acute distress  Cardiovascular: Warm and well perfused  Respiratory: Normal respiratory effort   Abdominal: Soft, gravid, non-tender    NST: baseline 130, moderate variability, unclear if spontaneous decel vs baseline change at 0535 but subsequent 1/2 hr reassuring tracing  [place]: occasional ctx     Labs:   Results for orders placed or performed during the hospital encounter of 03/07/24 (from the past 24 hour(s))   Beta Hydroxybutyrate   Result Value Ref Range    Beta-Hydroxybutyrate 0.34 (H) 0.02 - 0.27 mmol/L   CBC and Auto Differential   Result Value Ref Range    WBC 7.6 4.4 - 11.3 x10*3/uL    nRBC 0.0 0.0 - 0.0 /100 WBCs    RBC 4.01 4.00 - 5.20 x10*6/uL    Hemoglobin 10.7 (L) 12.0 - 16.0 g/dL    Hematocrit 32.8 (L) 36.0 - 46.0 %    MCV 82 80 - 100 fL    MCH 26.7 26.0 - 34.0 pg    MCHC 32.6 32.0 - 36.0 g/dL    RDW 14.3 11.5 - 14.5 %    Platelets 223 150 - 450 x10*3/uL    Neutrophils % 63.9 40.0 - 80.0 %    Immature Granulocytes %, Automated 0.5 0.0 - 0.9 %    Lymphocytes % 23.1 13.0 - 44.0 %    Monocytes % 9.6 2.0 - 10.0 %    Eosinophils % 2.5 0.0 - 6.0 %    Basophils % 0.4 0.0 - 2.0 %    Neutrophils Absolute 4.86 1.20 - 7.70 x10*3/uL    Immature Granulocytes Absolute, Automated 0.04 0.00 - 0.70 x10*3/uL    Lymphocytes Absolute 1.76 1.20 - 4.80 x10*3/uL    Monocytes Absolute 0.73 0.10 - 1.00 x10*3/uL    Eosinophils Absolute 0.19 0.00 - 0.70 x10*3/uL    Basophils Absolute 0.03 0.00 - 0.10 x10*3/uL "   Magnesium   Result Value Ref Range    Magnesium 1.63 1.60 - 2.40 mg/dL   Comprehensive metabolic panel   Result Value Ref Range    Glucose 124 (H) 74 - 99 mg/dL    Sodium 138 136 - 145 mmol/L    Potassium 3.6 3.5 - 5.3 mmol/L    Chloride 109 (H) 98 - 107 mmol/L    Bicarbonate 20 (L) 21 - 32 mmol/L    Anion Gap 13 10 - 20 mmol/L    Urea Nitrogen 6 6 - 23 mg/dL    Creatinine 0.37 (L) 0.50 - 1.05 mg/dL    eGFR >90 >60 mL/min/1.73m*2    Calcium 8.3 (L) 8.6 - 10.6 mg/dL    Albumin 2.8 (L) 3.4 - 5.0 g/dL    Alkaline Phosphatase 142 (H) 33 - 110 U/L    Total Protein 5.4 (L) 6.4 - 8.2 g/dL    AST 9 9 - 39 U/L    Bilirubin, Total 0.3 0.0 - 1.2 mg/dL    ALT 3 (L) 7 - 45 U/L   POCT GLUCOSE   Result Value Ref Range    POCT Glucose 274 (H) 74 - 99 mg/dL   POCT GLUCOSE   Result Value Ref Range    POCT Glucose 191 (H) 74 - 99 mg/dL   POCT GLUCOSE   Result Value Ref Range    POCT Glucose 194 (H) 74 - 99 mg/dL   POCT GLUCOSE   Result Value Ref Range    POCT Glucose 146 (H) 74 - 99 mg/dL     ASSESSMENT AND PLAN:   Scooter is a  at 35w2d by 8wk US presenting from office for elevated blood sugar and elevated BP in the setting of poorly controlled GDM and gHTN.      GDM, insulin controlled, c/f DKA  - BG in past 24h 136-274. Fasting and postprandial BG elevated  - Current regimen: lantus 10/16, lispro 4 TID with meals -> increase to lantus 12/, lispro 6 TID with meals  - Initial c/f DKA; pH initially 7.38 but improved to 7.42, decreasing concern; insulin gtt deferred. BHB 0.26 -> 0.64 -> 0.34. Most likely dehydration vs starvation ketosis. 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 P:C 0.45,  baseline 0.1  - Hx sPEC in prior pregnancies     Fetal status  - NST reactive, reassuring this AM  - Continue daily NSTs while inpatient   - EFW on 3/1: 3285g (>99%), AC >99%  - BMZ deferred in the setting of late  and poorly controlled GDM  - Hx PPH, for T&C at time of delivery  - Hx shoulder dystocia, LGA fetus. Patient previously counseled on risk of repeat shoulder dystocia, desires vaginal delivery   - Pt history of cold sores, no hx genital lesions, for SSE at time of IOL     Routine  - Cephalic 3/7  - GBS neg   - S/p TDAP and Flu   - Rubella, non-immune, for MMR postpartum   - PPBC: s/p bilateral salpingectomy prior to this pregnancy      Dispo: inpatient management for poorly controlled GDM until delivery at 36wga      To be d/w Dr. Savana Goldsmith MD  PGY-3, Obstetrics and Gynecology  [facility] Pager: [phone]    Principal Problem:    Gestational diabetes

## 2024-03-09 NOTE — SIGNIFICANT EVENT
"Scooter Granger is a 23 y.o.  at 35w2d admitted for uncontrolled GDMA2 and siPEC w/o SF.    House Officer to bedside after reports that patient lost mucous plug (egg-sized mucoid discharge with blood mixed in) with increased vaginal pressure. Patient denies contractions, ongoing VB, or LOF. Reports good FM.     BP (!) 152/104   Pulse 104   Temp 36.8 °C (98.2 °F) (Temporal)   Resp 18   Ht 1.651 m (5' 5\")   Wt 101 kg (223 lb 1.7 oz)   SpO2 98%   BMI 37.13 kg/m²      Genitourinary:      Vulva normal.   Cardiovascular:      Rate and Rhythm: Normal rate.   Pulmonary:      Effort: Pulmonary effort is normal.      Breath sounds: Normal breath sounds.   Abdominal:      Palpations: Abdomen is soft.      Comments: Gravid, non-tender to palpation  Neurological:      General: No focal deficit present.      Mental Status: She is alert.   Skin:     General: Skin is warm and dry.   Psychiatric:         Mood and Affect: Mood normal.         Behavior: Behavior normal.       SVE: 3/50/-3    NST  Baseline: 136  Variability:  mod  Accels: +  Decels: -    [place]: quiet    A/P  - SVE unchanged from last documented exam  - Patient comfortably resting; denies ctx, VB, LOF  - NST reactive; no ctx noted to toco  - Presentation reassuring; OK to remain on Mac 4 at this time. To be communicated with day team.    D/w Dr. Amado Rogers MD  PGY-1, Obstetrics & Gynecology   [facility]   "

## 2024-03-10 LAB
ABO GROUP (TYPE) IN BLOOD: NORMAL
ANTIBODY SCREEN: NORMAL
GLUCOSE BLD MANUAL STRIP-MCNC: 115 MG/DL (ref 74–99)
GLUCOSE BLD MANUAL STRIP-MCNC: 162 MG/DL (ref 74–99)
GLUCOSE BLD MANUAL STRIP-MCNC: 168 MG/DL (ref 74–99)
GLUCOSE BLD MANUAL STRIP-MCNC: 234 MG/DL (ref 74–99)
RH FACTOR (ANTIGEN D): NORMAL

## 2024-03-10 PROCEDURE — 82947 ASSAY GLUCOSE BLOOD QUANT: CPT

## 2024-03-10 PROCEDURE — 1210000001 HC SEMI-PRIVATE ROOM DAILY

## 2024-03-10 PROCEDURE — 99233 SBSQ HOSP IP/OBS HIGH 50: CPT | Performed by: STUDENT IN AN ORGANIZED HEALTH CARE EDUCATION/TRAINING PROGRAM

## 2024-03-10 PROCEDURE — 59025 FETAL NON-STRESS TEST: CPT | Mod: GC | Performed by: STUDENT IN AN ORGANIZED HEALTH CARE EDUCATION/TRAINING PROGRAM

## 2024-03-10 PROCEDURE — 86901 BLOOD TYPING SEROLOGIC RH(D): CPT | Performed by: STUDENT IN AN ORGANIZED HEALTH CARE EDUCATION/TRAINING PROGRAM

## 2024-03-10 PROCEDURE — 59025 FETAL NON-STRESS TEST: CPT | Performed by: STUDENT IN AN ORGANIZED HEALTH CARE EDUCATION/TRAINING PROGRAM

## 2024-03-10 PROCEDURE — 36415 COLL VENOUS BLD VENIPUNCTURE: CPT | Performed by: STUDENT IN AN ORGANIZED HEALTH CARE EDUCATION/TRAINING PROGRAM

## 2024-03-10 PROCEDURE — 2500000001 HC RX 250 WO HCPCS SELF ADMINISTERED DRUGS (ALT 637 FOR MEDICARE OP)

## 2024-03-10 RX ORDER — INSULIN GLARGINE 100 [IU]/ML
16 INJECTION, SOLUTION SUBCUTANEOUS
Status: DISCONTINUED | OUTPATIENT
Start: 2024-03-10 | End: 2024-03-11

## 2024-03-10 RX ORDER — INSULIN GLARGINE 100 [IU]/ML
24 INJECTION, SOLUTION SUBCUTANEOUS NIGHTLY
Status: DISCONTINUED | OUTPATIENT
Start: 2024-03-10 | End: 2024-03-11

## 2024-03-10 RX ORDER — INSULIN LISPRO 100 [IU]/ML
10 INJECTION, SOLUTION INTRAVENOUS; SUBCUTANEOUS
Status: DISCONTINUED | OUTPATIENT
Start: 2024-03-10 | End: 2024-03-11

## 2024-03-10 RX ORDER — INSULIN GLARGINE 100 [IU]/ML
16 INJECTION, SOLUTION SUBCUTANEOUS
Status: DISCONTINUED | OUTPATIENT
Start: 2024-03-11 | End: 2024-03-10

## 2024-03-10 RX ADMIN — INSULIN LISPRO 4 UNITS: 100 INJECTION, SOLUTION INTRAVENOUS; SUBCUTANEOUS at 17:17

## 2024-03-10 RX ADMIN — PRENATAL VIT W/ FE FUMARATE-FA TAB 27-0.8 MG 1 TABLET: 27-0.8 TAB at 08:11

## 2024-03-10 RX ADMIN — INSULIN LISPRO 2 UNITS: 100 INJECTION, SOLUTION INTRAVENOUS; SUBCUTANEOUS at 22:08

## 2024-03-10 RX ADMIN — INSULIN GLARGINE 16 UNITS: 100 INJECTION, SOLUTION SUBCUTANEOUS at 10:30

## 2024-03-10 RX ADMIN — INSULIN LISPRO 10 UNITS: 100 INJECTION, SOLUTION INTRAVENOUS; SUBCUTANEOUS at 19:55

## 2024-03-10 RX ADMIN — INSULIN LISPRO 10 UNITS: 100 INJECTION, SOLUTION INTRAVENOUS; SUBCUTANEOUS at 15:16

## 2024-03-10 RX ADMIN — INSULIN LISPRO 2 UNITS: 100 INJECTION, SOLUTION INTRAVENOUS; SUBCUTANEOUS at 14:19

## 2024-03-10 RX ADMIN — INSULIN GLARGINE 24 UNITS: 100 INJECTION, SOLUTION SUBCUTANEOUS at 21:13

## 2024-03-10 ASSESSMENT — PAIN - FUNCTIONAL ASSESSMENT
PAIN_FUNCTIONAL_ASSESSMENT: 0-10

## 2024-03-10 ASSESSMENT — PAIN SCALES - GENERAL
PAINLEVEL_OUTOF10: 0 - NO PAIN

## 2024-03-10 NOTE — PROGRESS NOTES
"ANTEPARTUM PROGRESS NOTE   3/10/2024, 6:32 AM     SUBJECTIVE:  Feels well this morning, no complaints    OBJECTIVE:   /77 (BP Location: Right arm, Patient Position: Lying)   Pulse 93   Temp 36.8 °C (98.2 °F) (Temporal)   Resp 18   Ht 1.651 m (5' 5\")   Wt 101 kg (223 lb 1.7 oz)   SpO2 98%   BMI 37.13 kg/m²    Temp  Min: 36 °C (96.8 °F)  Max: 36.8 °C (98.2 °F)  Pulse  Min: 93  Max: 110  BP  Min: 96/62  Max: 145/93    NST: baseline 135, moderate variability, + accels, - decels  [place]: quiet    Labs:   Results for orders placed or performed during the hospital encounter of 03/07/24 (from the past 24 hour(s))   Beta Hydroxybutyrate   Result Value Ref Range    Beta-Hydroxybutyrate 0.20 0.02 - 0.27 mmol/L   CBC and Auto Differential   Result Value Ref Range    WBC 8.2 4.4 - 11.3 x10*3/uL    nRBC 0.0 0.0 - 0.0 /100 WBCs    RBC 4.05 4.00 - 5.20 x10*6/uL    Hemoglobin 10.8 (L) 12.0 - 16.0 g/dL    Hematocrit 32.1 (L) 36.0 - 46.0 %    MCV 79 (L) 80 - 100 fL    MCH 26.7 26.0 - 34.0 pg    MCHC 33.6 32.0 - 36.0 g/dL    RDW 14.4 11.5 - 14.5 %    Platelets 219 150 - 450 x10*3/uL    Neutrophils % 56.4 40.0 - 80.0 %    Immature Granulocytes %, Automated 1.0 (H) 0.0 - 0.9 %    Lymphocytes % 27.7 13.0 - 44.0 %    Monocytes % 12.1 2.0 - 10.0 %    Eosinophils % 2.4 0.0 - 6.0 %    Basophils % 0.4 0.0 - 2.0 %    Neutrophils Absolute 4.61 1.20 - 7.70 x10*3/uL    Immature Granulocytes Absolute, Automated 0.08 0.00 - 0.70 x10*3/uL    Lymphocytes Absolute 2.26 1.20 - 4.80 x10*3/uL    Monocytes Absolute 0.99 0.10 - 1.00 x10*3/uL    Eosinophils Absolute 0.20 0.00 - 0.70 x10*3/uL    Basophils Absolute 0.03 0.00 - 0.10 x10*3/uL   Magnesium   Result Value Ref Range    Magnesium 1.66 1.60 - 2.40 mg/dL   Comprehensive metabolic panel   Result Value Ref Range    Glucose 164 (H) 74 - 99 mg/dL    Sodium 136 136 - 145 mmol/L    Potassium 3.5 3.5 - 5.3 mmol/L    Chloride 107 98 - 107 mmol/L    Bicarbonate 17 (L) 21 - 32 mmol/L    Anion Gap 16 " 10 - 20 mmol/L    Urea Nitrogen 5 (L) 6 - 23 mg/dL    Creatinine 0.39 (L) 0.50 - 1.05 mg/dL    eGFR >90 >60 mL/min/1.73m*2    Calcium 8.4 (L) 8.6 - 10.6 mg/dL    Albumin 2.9 (L) 3.4 - 5.0 g/dL    Alkaline Phosphatase 148 (H) 33 - 110 U/L    Total Protein 5.5 (L) 6.4 - 8.2 g/dL    AST 8 (L) 9 - 39 U/L    Bilirubin, Total 0.3 0.0 - 1.2 mg/dL    ALT 4 (L) 7 - 45 U/L   POCT GLUCOSE   Result Value Ref Range    POCT Glucose 211 (H) 74 - 99 mg/dL   POCT urinalysis dipstick   Result Value Ref Range    Color, UA      Clarity, UA      Glucose, UA      Bilirubin, UA      Ketones, UA      Spec Grav, UA      Blood, UA      pH, UA      Protein, UA      Urobilinogen, UA      Leukocytes, UA      Nitrite, UA     POCT GLUCOSE   Result Value Ref Range    POCT Glucose 229 (H) 74 - 99 mg/dL   POCT GLUCOSE   Result Value Ref Range    POCT Glucose 150 (H) 74 - 99 mg/dL   POCT GLUCOSE   Result Value Ref Range    POCT Glucose 175 (H) 74 - 99 mg/dL   POCT GLUCOSE   Result Value Ref Range    POCT Glucose 115 (H) 74 - 99 mg/dL     ASSESSMENT AND PLAN:   Scooter is a  at 35w3d by 8wk US presenting from office for elevated blood sugar and elevated BP in the setting of poorly controlled GDM and gHTN.      GDM, insulin controlled, c/f DKA  - BG in past 24h 115-229. Fasting and postprandial BG elevated  - Current regimen: lantus 12/20, lispro 6 TID with meals -> increase to lantus 14/24, lispro 8 TID with meals  - Initial c/f DKA; pH initially 7.38 but improved to 7.42, decreasing concern; insulin gtt deferred. BHB peak 0.64, now normalized. Urine ketones cleared. 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 P:C 0.45, baseline 0.1  - Hx sPEC in prior pregnancies     Fetal status  - NST reactive, reassuring this  AM  - Continue daily NSTs while inpatient   - EFW on 3/1: 3285g (>99%), AC >99%  - BMZ deferred in the setting of late  and poorly controlled GDM  - Hx PPH, for T&C at time of delivery  - Hx shoulder dystocia, LGA fetus. Patient previously counseled on risk of repeat shoulder dystocia, desires vaginal delivery   - Pt history of cold sores, no hx genital lesions, for SSE at time of IOL     Routine  - Cephalic 3/7  - GBS neg   - S/p TDAP and Flu   - Rubella, non-immune, for MMR postpartum   - PPBC: s/p bilateral salpingectomy prior to this pregnancy      Dispo: inpatient management for poorly controlled GDM until delivery at 36wga      To be d/w Dr. Savana Goldsmith MD  PGY-3, Obstetrics and Gynecology  [facility] Pager: [phone]

## 2024-03-10 NOTE — CARE PLAN
Problem: Antepartum  Goal: Maintain pregnancy as long as maternal and/or fetal condition is stable  Outcome: Progressing  Goal: Avoid/minimize constipation  Outcome: Progressing  Goal: No decrease in circulation/VTE  Outcome: Progressing  Goal: FHR remains reassuring  Outcome: Progressing  Goal: Minimize anxiety/maximize coping  Outcome: Progressing     Problem: Hypertensive Disorder of Pregnancy (HDP)  Goal: Minimal s/sx of HDP and BP<160/110  Outcome: Progressing  Goal: Adequate urine output (0.5 ml/kg/hr)  Outcome: Progressing     Problem: Pain - Adult  Goal: Verbalizes/displays adequate comfort level or baseline comfort level  Outcome: Progressing     Problem: Safety - Adult  Goal: Free from fall injury  Outcome: Progressing   The patient's goals for the shift include sleep more    The clinical goals for the shift include BP's will be less than 160/110.  VSS and NST was reactive this am.Pt. forgot to call Rn before she ate breakfast so did not get her pre-meal insulin.Her insulin was adjusted today.She has good FM.She denies s/sx of PEC.I enc. Her to call RN before eating meals so she can get her insulin.Stable.     190

## 2024-03-11 ENCOUNTER — APPOINTMENT (OUTPATIENT)
Dept: RADIOLOGY | Facility: HOSPITAL | Age: 24
End: 2024-03-11
Payer: MEDICAID

## 2024-03-11 LAB
GLUCOSE BLD MANUAL STRIP-MCNC: 124 MG/DL (ref 74–99)
GLUCOSE BLD MANUAL STRIP-MCNC: 141 MG/DL (ref 74–99)
GLUCOSE BLD MANUAL STRIP-MCNC: 151 MG/DL (ref 74–99)

## 2024-03-11 PROCEDURE — 59025 FETAL NON-STRESS TEST: CPT | Performed by: STUDENT IN AN ORGANIZED HEALTH CARE EDUCATION/TRAINING PROGRAM

## 2024-03-11 PROCEDURE — 1210000001 HC SEMI-PRIVATE ROOM DAILY

## 2024-03-11 PROCEDURE — 82947 ASSAY GLUCOSE BLOOD QUANT: CPT

## 2024-03-11 PROCEDURE — 76818 FETAL BIOPHYS PROFILE W/NST: CPT

## 2024-03-11 PROCEDURE — 59025 FETAL NON-STRESS TEST: CPT | Mod: GC | Performed by: STUDENT IN AN ORGANIZED HEALTH CARE EDUCATION/TRAINING PROGRAM

## 2024-03-11 PROCEDURE — 99232 SBSQ HOSP IP/OBS MODERATE 35: CPT | Performed by: STUDENT IN AN ORGANIZED HEALTH CARE EDUCATION/TRAINING PROGRAM

## 2024-03-11 RX ORDER — INSULIN GLARGINE 100 [IU]/ML
20 INJECTION, SOLUTION SUBCUTANEOUS
Status: DISCONTINUED | OUTPATIENT
Start: 2024-03-12 | End: 2024-03-15

## 2024-03-11 RX ORDER — INSULIN GLARGINE 100 [IU]/ML
28 INJECTION, SOLUTION SUBCUTANEOUS NIGHTLY
Status: DISCONTINUED | OUTPATIENT
Start: 2024-03-11 | End: 2024-03-12

## 2024-03-11 RX ORDER — INSULIN LISPRO 100 [IU]/ML
14 INJECTION, SOLUTION INTRAVENOUS; SUBCUTANEOUS
Status: DISCONTINUED | OUTPATIENT
Start: 2024-03-11 | End: 2024-03-13

## 2024-03-11 RX ADMIN — INSULIN LISPRO 14 UNITS: 100 INJECTION, SOLUTION INTRAVENOUS; SUBCUTANEOUS at 17:31

## 2024-03-11 RX ADMIN — INSULIN GLARGINE 28 UNITS: 100 INJECTION, SOLUTION SUBCUTANEOUS at 21:07

## 2024-03-11 RX ADMIN — INSULIN GLARGINE 16 UNITS: 100 INJECTION, SOLUTION SUBCUTANEOUS at 06:59

## 2024-03-11 RX ADMIN — INSULIN LISPRO 2 UNITS: 100 INJECTION, SOLUTION INTRAVENOUS; SUBCUTANEOUS at 18:45

## 2024-03-11 RX ADMIN — INSULIN LISPRO 14 UNITS: 100 INJECTION, SOLUTION INTRAVENOUS; SUBCUTANEOUS at 22:16

## 2024-03-11 ASSESSMENT — PAIN SCALES - GENERAL
PAINLEVEL_OUTOF10: 0 - NO PAIN
PAINLEVEL_OUTOF10: 0 - NO PAIN

## 2024-03-11 ASSESSMENT — PAIN - FUNCTIONAL ASSESSMENT
PAIN_FUNCTIONAL_ASSESSMENT: 0-10
PAIN_FUNCTIONAL_ASSESSMENT: 0-10

## 2024-03-11 NOTE — PROGRESS NOTES
"ANTEPARTUM PROGRESS NOTE   3/11/2024, 6:10 AM     SUBJECTIVE: Overnight, Ms. Granger reported increased rectal pressure and constant bilateral sharp stabbing pain, SVE unchanged. Concerned for LOF, PPROM ruled out on SSE.    This morning, she reports 5/10 pain, but continues to decline heat packs and tylenol. No other acute events, no other complaints this morning other than disliking being poked so often and wishing to be discharged. This AM, she denies painful contractions, VB, LOF. Reports normal fetal movement. She also denies headache, vision changes, chest pain, shortness of breath, and right upper quadrant pain.     OBJECTIVE:   BP (!) 155/87 (BP Location: Right arm, Patient Position: Sitting)   Pulse 95   Temp 36.6 °C (97.9 °F) (Temporal)   Resp 18   Ht 1.651 m (5' 5\")   Wt 101 kg (223 lb 1.7 oz)   SpO2 98%   BMI 37.13 kg/m²    Temp  Min: 36.4 °C (97.5 °F)  Max: 37.1 °C (98.8 °F)  Pulse  Min: 80  Max: 115  BP  Min: 120/80  Max: 155/87    Physical Exam:  General: AAOx3, No acute distress  Cardiovascular: Warm and well perfused, regular rate and rhythm, normal S1 & S2  Respiratory: Normal respiratory effort, clear to auscultation bilaterally as auscultated anteriorly  Abdominal: Gravid  Extremities: No lower limb edema, no calf tenderness on palpation    NST: baseline 135, mod [place]ldon, +accel, -decel  [place]: irritable    Labs:   Lab Results   Component Value Date    WBC 8.2 03/09/2024    HGB 10.8 (L) 03/09/2024    HCT 32.1 (L) 03/09/2024     03/09/2024     Lab Results   Component Value Date    GLUCOSE 164 (H) 03/09/2024     03/09/2024    K 3.5 03/09/2024     03/09/2024    CO2 17 (L) 03/09/2024    ANIONGAP 16 03/09/2024    BUN 5 (L) 03/09/2024    CREATININE 0.39 (L) 03/09/2024    EGFR >90 03/09/2024    CALCIUM 8.4 (L) 03/09/2024    ALBUMIN 2.9 (L) 03/09/2024    PROT 5.5 (L) 03/09/2024    ALKPHOS 148 (H) 03/09/2024    ALT 4 (L) 03/09/2024    AST 8 (L) 03/09/2024    BILITOT 0.3 03/09/2024 "     Beta Hydroxybutyrate   Result Value Ref Range    3/9/2024 Beta-Hydroxybutyrate 0.20 0.02 - 0.27 mmol/L     Magnesium   Result Value Ref Range    3/9/2024 Magnesium 1.66 1.60 - 2.40 mg/dL     Component  Ref Range & Units 1 d ago  (3/10/24) 1 d ago  (3/10/24) 1 d ago  (3/10/24) 1 d ago  (3/10/24) 2 d ago  (3/9/24) 2 d ago  (3/9/24) 2 d ago  (3/9/24)   POCT Glucose  74 - 99 mg/dL 162 High  234 High  168 High  115 High  175 High  150 High  229 High      ASSESSMENT AND PLAN:   Ms. Granger is a 23 y.o.  at 35w4d by 8 wk US admitted for poorly controlled GDM    GDM, insulin controlled  - BG in past 24h: 115-234  - Current regimen: lantus , lispro 10 TID with meals -> increase to lantus /, lispro 14 TID with meals  - Initial c/f DKA; pH initially 7.38 but improved to 7.42, decreasing concern; insulin gtt deferred. BHB peak 0.64, now normalized. Urine ketones cleared. 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 High mild range BPs overnight  - HELLP labs negative x2, for weekly HELLP labs. P:C 0.45 (3/7), baseline 0.1  - Hx sPEC in prior pregnancies    Fetal status  - NST reactive this AM  - Weekly BPP, due today  - Continue daily NSTs while inpatient  - EFW on 3/1: 3285g (>99%), AC >99%  - BMZ deferred in the setting of late  and poorly controlled GDM  - Hx PPH, for T&C at time of delivery  - Hx shoulder dystocia, LGA fetus. Patient previously counseled on risk of repeat shoulder dystocia, desires vaginal delivery    - Pt history of cold sores, no hx genital lesions, for SSE at time of IOL    Routine  - Cephalic 3/7  - GBS neg   - S/p TDAP and Flu   - Rubella, non-immune, for MMR postpartum   - PPBC: s/p bilateral salpingectomy prior to this pregnancy      Dispo: inpatient management for poorly controlled GDM until delivery at 36wga    Pt to be seen and discussed with MFM Attending.    Alaina Franks, Medical Student (MS-3)    I saw and evaluated the patient and discussed with the MS3, edits made in text above. Patient seen and discussed with Dr. Demian Goldsmith MD  PGY-3, Obstetrics and Gynecology  [facility] Pager: [phone]

## 2024-03-11 NOTE — SIGNIFICANT EVENT
Called to bedside - patient reporting she is leaking fluid. Stated when she was using the bathroom, felt a gush of fluid. Also having some right sided pain.     SSE: neg pooling, nitrazine, ferning. Cervix visually closed    [place]: irritable, no ctx    Pt declining tylenol and heat pack for right sided pain - likely MSK pain related to pregnancy    Discussed with Dr. Polo Paulson MD PGY-1

## 2024-03-11 NOTE — SIGNIFICANT EVENT
Called to bedside for patient feeling increased rectal pressure and ctx. Patient reports constant bilateral sharp stabbing pain.    On toco irritable    SVE 3/50/-3 - unchanged from prior    PTL unlikely at this time. Likely round ligament pain. Heat packs, tylenol, and flexeril offered. Patient declines at this time    Discussed with Dr. Polo Paulson MD PGY-1

## 2024-03-12 LAB
GLUCOSE BLD MANUAL STRIP-MCNC: 126 MG/DL (ref 74–99)
GLUCOSE BLD MANUAL STRIP-MCNC: 177 MG/DL (ref 74–99)
GLUCOSE BLD MANUAL STRIP-MCNC: 219 MG/DL (ref 74–99)

## 2024-03-12 PROCEDURE — 59025 FETAL NON-STRESS TEST: CPT | Performed by: STUDENT IN AN ORGANIZED HEALTH CARE EDUCATION/TRAINING PROGRAM

## 2024-03-12 PROCEDURE — 59025 FETAL NON-STRESS TEST: CPT | Mod: GC | Performed by: STUDENT IN AN ORGANIZED HEALTH CARE EDUCATION/TRAINING PROGRAM

## 2024-03-12 PROCEDURE — 99232 SBSQ HOSP IP/OBS MODERATE 35: CPT | Performed by: STUDENT IN AN ORGANIZED HEALTH CARE EDUCATION/TRAINING PROGRAM

## 2024-03-12 PROCEDURE — 2500000001 HC RX 250 WO HCPCS SELF ADMINISTERED DRUGS (ALT 637 FOR MEDICARE OP)

## 2024-03-12 PROCEDURE — 1210000001 HC SEMI-PRIVATE ROOM DAILY

## 2024-03-12 PROCEDURE — 82947 ASSAY GLUCOSE BLOOD QUANT: CPT

## 2024-03-12 RX ORDER — CALCIUM CARBONATE 200(500)MG
500 TABLET,CHEWABLE ORAL 2 TIMES DAILY PRN
Status: DISCONTINUED | OUTPATIENT
Start: 2024-03-12 | End: 2024-03-15

## 2024-03-12 RX ORDER — INSULIN GLARGINE 100 [IU]/ML
32 INJECTION, SOLUTION SUBCUTANEOUS NIGHTLY
Status: DISCONTINUED | OUTPATIENT
Start: 2024-03-12 | End: 2024-03-13

## 2024-03-12 RX ORDER — FAMOTIDINE 20 MG/1
20 TABLET, FILM COATED ORAL 2 TIMES DAILY
Status: DISCONTINUED | OUTPATIENT
Start: 2024-03-12 | End: 2024-03-16

## 2024-03-12 RX ADMIN — PRENATAL VIT W/ FE FUMARATE-FA TAB 27-0.8 MG 1 TABLET: 27-0.8 TAB at 08:42

## 2024-03-12 RX ADMIN — INSULIN LISPRO 2 UNITS: 100 INJECTION, SOLUTION INTRAVENOUS; SUBCUTANEOUS at 12:16

## 2024-03-12 RX ADMIN — INSULIN LISPRO 14 UNITS: 100 INJECTION, SOLUTION INTRAVENOUS; SUBCUTANEOUS at 20:42

## 2024-03-12 RX ADMIN — INSULIN GLARGINE 32 UNITS: 100 INJECTION, SOLUTION SUBCUTANEOUS at 20:43

## 2024-03-12 RX ADMIN — INSULIN LISPRO 4 UNITS: 100 INJECTION, SOLUTION INTRAVENOUS; SUBCUTANEOUS at 22:08

## 2024-03-12 RX ADMIN — FAMOTIDINE 20 MG: 20 TABLET, FILM COATED ORAL at 21:18

## 2024-03-12 RX ADMIN — INSULIN LISPRO 14 UNITS: 100 INJECTION, SOLUTION INTRAVENOUS; SUBCUTANEOUS at 11:08

## 2024-03-12 RX ADMIN — INSULIN GLARGINE 20 UNITS: 100 INJECTION, SOLUTION SUBCUTANEOUS at 07:00

## 2024-03-12 RX ADMIN — CALCIUM CARBONATE (ANTACID) CHEW TAB 500 MG 500 MG: 500 CHEW TAB at 21:18

## 2024-03-12 ASSESSMENT — PAIN - FUNCTIONAL ASSESSMENT
PAIN_FUNCTIONAL_ASSESSMENT: 0-10
PAIN_FUNCTIONAL_ASSESSMENT: 0-10

## 2024-03-12 ASSESSMENT — PAIN SCALES - GENERAL
PAINLEVEL_OUTOF10: 0 - NO PAIN

## 2024-03-12 NOTE — CARE PLAN
The patient's goals for the shift include to get some rest    The clinical goals for the shift include BP normotensive, BS controlled with ordered insulin regimen    Pt BP remained normotensive throughout shift, BS being controlled with ordered insulin regimen. FHR WNL upon spot checks.   Problem: Antepartum  Goal: Maintain pregnancy as long as maternal and/or fetal condition is stable  Outcome: Progressing  Goal: Avoid/minimize constipation  Outcome: Progressing  Goal: No decrease in circulation/VTE  Outcome: Progressing  Goal: FHR remains reassuring  Outcome: Progressing  Goal: Minimize anxiety/maximize coping  Outcome: Progressing     Problem: Hypertensive Disorder of Pregnancy (HDP)  Goal: Minimal s/sx of HDP and BP<160/110  Outcome: Progressing  Goal: Adequate urine output (0.5 ml/kg/hr)  Outcome: Progressing     Problem: Pain - Adult  Goal: Verbalizes/displays adequate comfort level or baseline comfort level  Outcome: Progressing     Problem: Safety - Adult  Goal: Free from fall injury  Outcome: Progressing

## 2024-03-12 NOTE — PROGRESS NOTES
"ANTEPARTUM PROGRESS NOTE   3/12/2024, 6:35 AM     SUBJECTIVE: No VB, LOF, ctx. Good FM. Did not eat much during the day as she was sleeping.    OBJECTIVE:   /88   Pulse 88   Temp 36.1 °C (97 °F) (Temporal)   Resp 18   Ht 1.651 m (5' 5\")   Wt 101 kg (223 lb 1.7 oz)   SpO2 97%   BMI 37.13 kg/m²    Temp  Min: 35.8 °C (96.4 °F)  Max: 37.1 °C (98.8 °F)  Pulse  Min: 88  Max: 110  BP  Min: 111/76  Max: 147/85    Physical Exam:  General: AAOx3, No acute distress  Cardiovascular: Warm and well perfused  Respiratory: Normal respiratory effort  Abdominal: Gravid    NST: baseline 135, mod [place]ldon, +accel, -decel  [place]: occasional ctx    Labs:   Lab Results   Component Value Date    WBC 8.2 2024    HGB 10.8 (L) 2024    HCT 32.1 (L) 2024     2024     Lab Results   Component Value Date    GLUCOSE 164 (H) 2024     2024    K 3.5 2024     2024    CO2 17 (L) 2024    ANIONGAP 16 2024    BUN 5 (L) 2024    CREATININE 0.39 (L) 2024    EGFR >90 2024    CALCIUM 8.4 (L) 2024    ALBUMIN 2.9 (L) 2024    PROT 5.5 (L) 2024    ALKPHOS 148 (H) 2024    ALT 4 (L) 2024    AST 8 (L) 2024    BILITOT 0.3 2024     ASSESSMENT AND PLAN:   Ms. Granger is a 23 y.o.  at 35w5d by 8 wk US admitted for poorly controlled GDM    GDM, insulin controlled  - BG in past 24h: 124-151  - Current regimen: lantus 20/28, lispro 14 TID with meals. Mealtime mostly controlled, but fasting continues to be elevated -> increase to lantus 20/32*, continue lispro 14 TID with meals  - Initial c/f DKA; pH initially 7.38 but improved to 7.42, decreasing concern; insulin gtt deferred. BHB peak 0.64, now normalized. Urine ketones cleared. Low concern at this time   - Planning for delivery at 36 wga (induction scheduled 3/15) due to poorly controlled GDM or sooner if expectant management is contraindicated     siPEC without severe " features   - Mild range in office, previously majority BP normotensive, not on meds. Occasional mild range BPs  - HELLP labs negative x2, for weekly HELLP labs. P:C 0.45 (3/7), baseline 0.1  - Hx sPEC in prior pregnancies    Fetal status  - NST reactive this AM  - Weekly BPP,  on 3/11  - Continue daily NSTs while inpatient  - EFW on 3/1: 3285g (>99%), AC >99%  - BMZ deferred in the setting of late  and poorly controlled GDM  - Hx PPH, for T&C at time of delivery  - Hx shoulder dystocia, LGA fetus. Patient previously counseled on risk of repeat shoulder dystocia, desires vaginal delivery    - Pt history of cold sores, no hx genital lesions, for SSE at time of IOL    Routine  - Cephalic 3/7  - GBS neg   - S/p TDAP and Flu   - Rubella, non-immune, for MMR postpartum   - PPBC: s/p bilateral salpingectomy prior to this pregnancy     Dispo: inpatient management for poorly controlled GDM until delivery at 36wga    To be d/w Dr. Demian Goldsmith MD  PGY-3, Obstetrics and Gynecology  [facility] Pager: [phone]

## 2024-03-13 LAB
ABO GROUP (TYPE) IN BLOOD: NORMAL
ANTIBODY SCREEN: NORMAL
GLUCOSE BLD MANUAL STRIP-MCNC: 103 MG/DL (ref 74–99)
GLUCOSE BLD MANUAL STRIP-MCNC: 128 MG/DL (ref 74–99)
GLUCOSE BLD MANUAL STRIP-MCNC: 135 MG/DL (ref 74–99)
GLUCOSE BLD MANUAL STRIP-MCNC: 146 MG/DL (ref 74–99)
RH FACTOR (ANTIGEN D): NORMAL

## 2024-03-13 PROCEDURE — 86920 COMPATIBILITY TEST SPIN: CPT

## 2024-03-13 PROCEDURE — 86901 BLOOD TYPING SEROLOGIC RH(D): CPT | Performed by: STUDENT IN AN ORGANIZED HEALTH CARE EDUCATION/TRAINING PROGRAM

## 2024-03-13 PROCEDURE — 59025 FETAL NON-STRESS TEST: CPT

## 2024-03-13 PROCEDURE — 36415 COLL VENOUS BLD VENIPUNCTURE: CPT | Performed by: STUDENT IN AN ORGANIZED HEALTH CARE EDUCATION/TRAINING PROGRAM

## 2024-03-13 PROCEDURE — 2500000002 HC RX 250 W HCPCS SELF ADMINISTERED DRUGS (ALT 637 FOR MEDICARE OP, ALT 636 FOR OP/ED): Performed by: STUDENT IN AN ORGANIZED HEALTH CARE EDUCATION/TRAINING PROGRAM

## 2024-03-13 PROCEDURE — 82947 ASSAY GLUCOSE BLOOD QUANT: CPT

## 2024-03-13 PROCEDURE — 2500000001 HC RX 250 WO HCPCS SELF ADMINISTERED DRUGS (ALT 637 FOR MEDICARE OP)

## 2024-03-13 PROCEDURE — 59025 FETAL NON-STRESS TEST: CPT | Mod: GC

## 2024-03-13 PROCEDURE — 99232 SBSQ HOSP IP/OBS MODERATE 35: CPT

## 2024-03-13 PROCEDURE — 1210000001 HC SEMI-PRIVATE ROOM DAILY

## 2024-03-13 RX ORDER — INSULIN LISPRO 100 [IU]/ML
18 INJECTION, SOLUTION INTRAVENOUS; SUBCUTANEOUS
Status: DISCONTINUED | OUTPATIENT
Start: 2024-03-13 | End: 2024-03-15

## 2024-03-13 RX ORDER — INSULIN LISPRO 100 [IU]/ML
16 INJECTION, SOLUTION INTRAVENOUS; SUBCUTANEOUS
Status: DISCONTINUED | OUTPATIENT
Start: 2024-03-14 | End: 2024-03-15

## 2024-03-13 RX ORDER — INSULIN GLARGINE 100 [IU]/ML
36 INJECTION, SOLUTION SUBCUTANEOUS NIGHTLY
Status: DISCONTINUED | OUTPATIENT
Start: 2024-03-13 | End: 2024-03-14

## 2024-03-13 RX ADMIN — INSULIN LISPRO 14 UNITS: 100 INJECTION, SOLUTION INTRAVENOUS; SUBCUTANEOUS at 14:04

## 2024-03-13 RX ADMIN — INSULIN LISPRO 18 UNITS: 100 INJECTION, SOLUTION INTRAVENOUS; SUBCUTANEOUS at 19:17

## 2024-03-13 RX ADMIN — INSULIN GLARGINE 36 UNITS: 100 INJECTION, SOLUTION SUBCUTANEOUS at 21:46

## 2024-03-13 RX ADMIN — INSULIN GLARGINE 20 UNITS: 100 INJECTION, SOLUTION SUBCUTANEOUS at 06:50

## 2024-03-13 RX ADMIN — PRENATAL VIT W/ FE FUMARATE-FA TAB 27-0.8 MG 1 TABLET: 27-0.8 TAB at 10:44

## 2024-03-13 RX ADMIN — INSULIN LISPRO 14 UNITS: 100 INJECTION, SOLUTION INTRAVENOUS; SUBCUTANEOUS at 09:44

## 2024-03-13 RX ADMIN — FAMOTIDINE 20 MG: 20 TABLET, FILM COATED ORAL at 10:44

## 2024-03-13 ASSESSMENT — PAIN SCALES - GENERAL
PAINLEVEL_OUTOF10: 0 - NO PAIN

## 2024-03-13 ASSESSMENT — PAIN - FUNCTIONAL ASSESSMENT: PAIN_FUNCTIONAL_ASSESSMENT: 0-10

## 2024-03-13 NOTE — PROGRESS NOTES
".ANTEPARTUM PROGRESS NOTE   3/13/2024, 6:51 AM     SUBJECTIVE:  No acute events overnight. Patient has no complaints this morning. Denies painful contractions, VB, LOF. Reports normal fetal movement. Denies HA, vision changes, CP, SOB, RUQ or epigastric pain    OBJECTIVE:   /85   Pulse 106   Temp 36.1 °C (97 °F) (Temporal)   Resp 19   Ht 1.651 m (5' 5\")   Wt 101 kg (223 lb 1.7 oz)   SpO2 96%   BMI 37.13 kg/m²    Temp  Min: 36 °C (96.8 °F)  Max: 36.7 °C (98.1 °F)  Pulse  Min: 90  Max: 108  BP  Min: 124/80  Max: 140/91    General:  AAOx3, No acute distress  Cardiovascular: Warm and well perfused  Respiratory: Normal respiratory effort   Abdominal:  Soft, gravid, no palpable contractions , no RUQ pain   Extremities: Warm, well perfused, no edema, no calf tenderness   Pelvic: deferred      NST: Baseline 130/ + accels/ - decels/ moderate she[place]on  [place]: no ctx      Labs:   .  Results for orders placed or performed during the hospital encounter of 24 (from the past 24 hour(s))   POCT GLUCOSE   Result Value Ref Range    POCT Glucose 177 (H) 74 - 99 mg/dL   POCT GLUCOSE   Result Value Ref Range    POCT Glucose 219 (H) 74 - 99 mg/dL   POCT GLUCOSE   Result Value Ref Range    POCT Glucose 103 (H) 74 - 99 mg/dL         ASSESSMENT AND PLAN:     Ms. Granger is a 23 y.o.  at 35w6d by 8 wk US admitted for poorly controlled GDM     GDM, insulin controlled  - BG in past 24h: 124-151  - Current regimen: lantus 20/32, lispro 14 TID with meals. Moderately elevated Mealtime and fasting -> increase to lantus 20/36*,  lispro 16/16/18 TID with meals  - Initial c/f DKA; pH initially 7.38 but improved to 7.42, decreasing concern; insulin gtt deferred. BHB peak 0.64, now normalized. Urine ketones cleared. 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 Occasional mild range BPs  - HELLP labs negative x2, for weekly HELLP labs. P:C 0.45 (3/7), baseline 0.1  - Hx sPEC in prior pregnancies     Fetal status  - NST reactive this AM  - Weekly BPP,  on 3/11  - Continue daily NSTs while inpatient  - EFW on 3/1: 3285g (>99%), AC >99%  - BMZ deferred in the setting of late  and poorly controlled GDM  - Hx PPH, for T&C at time of delivery  - Hx shoulder dystocia, LGA fetus. Patient previously counseled on risk of repeat shoulder dystocia, desires vaginal delivery    - Pt history of cold sores, no hx genital lesions, for SSE at time of IOL     Routine  - Cephalic 3/7  - GBS neg   - S/p TDAP and Flu   - Rubella, non-immune, for MMR postpartum   - PPBC: s/p bilateral salpingectomy prior to this pregnancy      Dispo: inpatient management for poorly controlled GDM until delivery at 36wga      Pt to be seen and discussed with MFM Attending, Dr. Arciniega.     Nette Barrow MD, PGY-2   MFM  Pager [phone]     Principal Problem:    Gestational diabetes

## 2024-03-13 NOTE — CONSULTS
"Nutrition Note:   Nutrition Assessment    Reason for Assessment: Dietitian discretion (GDM)    Patient is a 23 y.o. female  at 35w0d by 8wk US presents for r/o DKA and r/o sPEC.   On admission it was noted that pt had not had any insulin since last discharge (3/1). It was reported pt was having difficulty getting and using insulin at home.    Updated MD progress note states decreased c/f DKA. Team continues to adjust insulin regimen. Planning for delivery at 36wga (3/15).     Anthropometrics:  Height: 165.1 cm (5' 5\")   Weight: 101 kg (223 lb 1.7 oz)   BMI (Calculated): 37.13    Weight History:   Wt Readings from Last 10 Encounters:   24 101 kg (223 lb 1.7 oz)   24 101 kg (223 lb)   24 101 kg (223 lb)   24 96.6 kg (213 lb)   24 98.2 kg (216 lb 7.9 oz)   02/10/24 96.6 kg (213 lb)   24 96.6 kg (213 lb)   24 97.2 kg (214 lb 4.8 oz)   24 95.4 kg (210 lb 3.3 oz)   24 94 kg (207 lb 3.2 oz)     Nutrition Significant Labs:  CBC Trend:   Results from last 7 days   Lab Units 24  0716 24  0909 24  1214   WBC AUTO x10*3/uL 8.2 7.6 8.5   RBC AUTO x10*6/uL 4.05 4.01 4.50   HEMOGLOBIN g/dL 10.8* 10.7* 12.0   HEMATOCRIT % 32.1* 32.8* 38.4   MCV fL 79* 82 85   PLATELETS AUTO x10*3/uL 219 223 246    , BMP Trend:   Results from last 7 days   Lab Units 24  0716 24  0909 24  1518 24  1214   GLUCOSE mg/dL 164* 124* 90 165*   CALCIUM mg/dL 8.4* 8.3* 8.8 9.0   SODIUM mmol/L 136 138 137 135*   POTASSIUM mmol/L 3.5 3.6 3.7 4.1   CO2 mmol/L 17* 20* 20* 17*   CHLORIDE mmol/L 107 109* 108* 107   BUN mg/dL 5* 6 7 9   CREATININE mg/dL 0.39* 0.37* 0.36* 0.41*    , A1C:  Lab Results   Component Value Date    HGBA1C 5.4 11/10/2022   , BG POCT trend:   Results from last 7 days   Lab Units 24  1044 24  0512 24  2203 24  1212 24  0621   POCT GLUCOSE mg/dL 128* 103* 219* 177* 126*    , Renal Lab Trend:   Results from last " 7 days   Lab Units 03/09/24  0716 03/08/24  0909 03/07/24  1518 03/07/24  1214 03/07/24  1214   POTASSIUM mmol/L 3.5 3.6 3.7  --  4.1   SODIUM mmol/L 136 138 137  --  135*   MAGNESIUM mg/dL 1.66 1.63 1.57*   < >  --    EGFR mL/min/1.73m*2 >90 >90 >90  --  >90   BUN mg/dL 5* 6 7  --  9   CREATININE mg/dL 0.39* 0.37* 0.36*  --  0.41*    < > = values in this interval not displayed.        Nutrition Specific Medications:  Scheduled medications  famotidine, 20 mg, oral, BID  insulin glargine, 20 Units, subcutaneous, Daily before breakfast  insulin glargine, 36 Units, subcutaneous, Nightly  insulin lispro, 0-10 Units, subcutaneous, TID after meals  [START ON 3/14/2024] insulin lispro, 16 Units, subcutaneous, Daily with breakfast  [START ON 3/14/2024] insulin lispro, 16 Units, subcutaneous, Daily with lunch  insulin lispro, 18 Units, subcutaneous, Daily with evening meal  magnesium sulfate, 4 g, intravenous, Once  prenatal vitamin (iron-folic), 1 tablet, oral, Daily      Dietary Orders (From admission, onward)       Start     Ordered    03/08/24 1733  Adult diet Diabetic Pregnancy; Breakfast 30 gm, snack 30 gm, lunch 45 gm, snack 30 gm, dinner 60 gm, snack 30 gm  Diet effective now        Question Answer Comment   Diet type Diabetic Pregnancy    Carb Counting Pregnancy: Breakfast 30 gm, snack 30 gm, lunch 45 gm, snack 30 gm, dinner 60 gm, snack 30 gm        03/08/24 1732           Nutrition Interventions/Recommendations     Pt already on gestational diabetic diet with insulin being adjusted per team. Plan is to deliver at 36wga (per chart review); therefore, no further interventions required at this time.   Pt to benefit from outpatient RDN referral post-partum as she may be at higher risk for development of type 2 DM given GDM.   Please place formal consult if pt has questions or concerns prior to discharge         Time Spent/Follow-up Reminder:   Time Spent (min): 30 minutes  Last Date of Nutrition Visit:  03/13/24  Nutrition Follow-Up Needed?: None  Follow up Comment: GDM, plan for delivery 3/15, no needs at this time, rec'd to reconsult if concerns/outpatient RDN referral

## 2024-03-13 NOTE — CARE PLAN
Problem: Address barriers to lifestyle change  Goal: Find workable solutions to meet health goals  Outcome: Progressing     Problem: Diabetes  Goal: Achieve decreasing blood glucose levels by end of shift  Outcome: Met  Goal: Increase stability of blood glucose readings by end of shift  Outcome: Met   The patient's goals for the shift include sleep    The clinical goals for the shift include patients glucose will remain <180 PP    Over the shift, the patient did make progress toward the following goals. Patients glucose remained < 180 post prandial.

## 2024-03-14 LAB
GLUCOSE BLD MANUAL STRIP-MCNC: 107 MG/DL (ref 74–99)
GLUCOSE BLD MANUAL STRIP-MCNC: 114 MG/DL (ref 74–99)
GLUCOSE BLD MANUAL STRIP-MCNC: 128 MG/DL (ref 74–99)
GLUCOSE BLD MANUAL STRIP-MCNC: 192 MG/DL (ref 74–99)

## 2024-03-14 PROCEDURE — 99232 SBSQ HOSP IP/OBS MODERATE 35: CPT

## 2024-03-14 PROCEDURE — 1210000001 HC SEMI-PRIVATE ROOM DAILY

## 2024-03-14 PROCEDURE — 2500000001 HC RX 250 WO HCPCS SELF ADMINISTERED DRUGS (ALT 637 FOR MEDICARE OP)

## 2024-03-14 PROCEDURE — 59025 FETAL NON-STRESS TEST: CPT | Mod: GC

## 2024-03-14 PROCEDURE — 82947 ASSAY GLUCOSE BLOOD QUANT: CPT

## 2024-03-14 PROCEDURE — 59025 FETAL NON-STRESS TEST: CPT

## 2024-03-14 RX ORDER — INSULIN GLARGINE 100 [IU]/ML
40 INJECTION, SOLUTION SUBCUTANEOUS NIGHTLY
Status: DISCONTINUED | OUTPATIENT
Start: 2024-03-14 | End: 2024-03-15

## 2024-03-14 RX ADMIN — INSULIN GLARGINE 20 UNITS: 100 INJECTION, SOLUTION SUBCUTANEOUS at 10:22

## 2024-03-14 RX ADMIN — INSULIN GLARGINE 40 UNITS: 100 INJECTION, SOLUTION SUBCUTANEOUS at 22:08

## 2024-03-14 RX ADMIN — INSULIN LISPRO 16 UNITS: 100 INJECTION, SOLUTION INTRAVENOUS; SUBCUTANEOUS at 11:47

## 2024-03-14 RX ADMIN — INSULIN LISPRO 2 UNITS: 100 INJECTION, SOLUTION INTRAVENOUS; SUBCUTANEOUS at 22:09

## 2024-03-14 RX ADMIN — PRENATAL VIT W/ FE FUMARATE-FA TAB 27-0.8 MG 1 TABLET: 27-0.8 TAB at 08:14

## 2024-03-14 RX ADMIN — FAMOTIDINE 20 MG: 20 TABLET, FILM COATED ORAL at 08:14

## 2024-03-14 RX ADMIN — FAMOTIDINE 20 MG: 20 TABLET, FILM COATED ORAL at 20:39

## 2024-03-14 RX ADMIN — INSULIN LISPRO 18 UNITS: 100 INJECTION, SOLUTION INTRAVENOUS; SUBCUTANEOUS at 20:30

## 2024-03-14 ASSESSMENT — PAIN SCALES - GENERAL
PAINLEVEL_OUTOF10: 0 - NO PAIN

## 2024-03-14 ASSESSMENT — PAIN - FUNCTIONAL ASSESSMENT
PAIN_FUNCTIONAL_ASSESSMENT: 0-10

## 2024-03-14 NOTE — CARE PLAN
Problem: Antepartum  Goal: Maintain pregnancy as long as maternal and/or fetal condition is stable  Outcome: Progressing  Goal: Avoid/minimize constipation  Outcome: Progressing  Goal: No decrease in circulation/VTE  Outcome: Progressing  Goal: FHR remains reassuring  Outcome: Progressing  Goal: Minimize anxiety/maximize coping  Outcome: Progressing     Problem: Hypertensive Disorder of Pregnancy (HDP)  Goal: Minimal s/sx of HDP and BP<160/110  Outcome: Progressing  Goal: Adequate urine output (0.5 ml/kg/hr)  Outcome: Progressing     Problem: Pain - Adult  Goal: Verbalizes/displays adequate comfort level or baseline comfort level  Outcome: Progressing     Problem: Safety - Adult  Goal: Free from fall injury  Outcome: Progressing     Problem: Address barriers to lifestyle change  Goal: Find workable solutions to meet health goals  Outcome: Progressing     Problem: Lack of knowledge on benefits of activity for meeting blood glucose targets and health goals  Goal: Discover best plan for being active and address any barriers  Outcome: Progressing     Problem: Diabetes  Goal: Decrease in ketones present in urine by end of shift  Outcome: Progressing  Goal: Maintain electrolyte levels within acceptable range throughout shift  Outcome: Progressing  Goal: Maintain glucose levels >70mg/dl to <250mg/dl throughout shift  Outcome: Progressing  Goal: No changes in neurological exam by end of shift  Outcome: Progressing  Goal: Learn about and adhere to nutrition recommendations by end of shift  Outcome: Progressing  Goal: Vital signs within normal range for age by end of shift  Outcome: Progressing  Goal: Increase self care and/or family involovement by end of shift  Outcome: Progressing  Goal: Receive DSME education by end of shift  Outcome: Progressing   The patient's goals for the shift include sleep now and be induced tomorrow.    The clinical goals for the shift include BP.'s will be less than 160/110.  VSS and NST was  reactive this am.Pt. denies s/sx of PEC.She did not need extra insulin coverage after breakfast .[facility] will have an IOL tomorrow.Stable.

## 2024-03-14 NOTE — PROGRESS NOTES
".ANTEPARTUM PROGRESS NOTE   3/14/2024, 6:52 AM     SUBJECTIVE:  No acute events overnight. Patient has no complaints this morning. Has irregular contractions, denies VB, LOF. Reports normal fetal movement. Denies HA, vision changes, CP, SOB, RUQ or epigastric pain    OBJECTIVE:   /74   Pulse 99   Temp 36.3 °C (97.3 °F) (Temporal)   Resp 18   Ht 1.651 m (5' 5\")   Wt 101 kg (223 lb 1.7 oz)   SpO2 97%   BMI 37.13 kg/m²    Temp  Min: 36.1 °C (97 °F)  Max: 36.8 °C (98.2 °F)  Pulse  Min: 91  Max: 101  BP  Min: 116/74  Max: 137/85    General:  AAOx3, No acute distress  Cardiovascular: Warm and well perfused  Respiratory: Normal respiratory effort   Abdominal:  Soft, gravid, no palpable contractions , no RUQ pain   Extremities: Warm, well perfused, no edema, no calf tenderness   Pelvic: deferred      NST: Baseline 140/ + accels/ - decels/ moderate [place]  [place]: irreg ctx      Labs:   .  Results for orders placed or performed during the hospital encounter of 24 (from the past 24 hour(s))   POCT GLUCOSE   Result Value Ref Range    POCT Glucose 128 (H) 74 - 99 mg/dL   POCT GLUCOSE   Result Value Ref Range    POCT Glucose 146 (H) 74 - 99 mg/dL   POCT GLUCOSE   Result Value Ref Range    POCT Glucose 135 (H) 74 - 99 mg/dL   POCT GLUCOSE   Result Value Ref Range    POCT Glucose 107 (H) 74 - 99 mg/dL   POCT GLUCOSE   Result Value Ref Range    POCT Glucose 114 (H) 74 - 99 mg/dL         ASSESSMENT AND PLAN:     Ms. Granger is a 23 y.o.  at 36w0d by 8 wk US admitted for poorly controlled GDM     GDM, insulin controlled  - BG in past 24h: 124-151  - Current regimen: lantus 20/32, lispro 14 TID with meals. Moderately elevated fasting -> increase to lantus 20/40*,  continue lispro 16/16/18 TID with meals  - Initial c/f DKA; pH initially 7.38 but improved to 7.42, decreasing concern; insulin gtt deferred. BHB peak 0.64, now normalized. Urine ketones cleared. 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 Occasional mild range Bps  -asymptomatic   - HELLP labs negative x2, for weekly HELLP labs. P:C 0.45 (3/7), baseline 0.1  - Hx sPEC in prior pregnancies     Fetal status  - NST reactive this AM  - Weekly BPP,  on 3/11  - Continue daily NSTs while inpatient  - EFW on 3/1: 3285g (>99%), AC >99%  - BMZ deferred in the setting of late  and poorly controlled GDM  - Hx PPH, for T&C at time of delivery  - Hx shoulder dystocia, LGA fetus. Patient previously counseled on risk of repeat shoulder dystocia, desires vaginal delivery    - Pt history of cold sores, no hx genital lesions, for SSE at time of IOL     Routine  - Cephalic 3/7  - GBS neg   - S/p TDAP and Flu   - Rubella, non-immune, for MMR postpartum   - PPBC: s/p bilateral salpingectomy prior to this pregnancy      Dispo: inpatient management for poorly controlled GDM until delivery tomorrow (3/15)      Pt to be seen and discussed with MFM Attending, Dr. Arciniega.     Nette Barrow MD, PGY-2   MFM  Pager [phone]     Principal Problem:    Gestational diabetes

## 2024-03-15 ENCOUNTER — PHARMACY VISIT (OUTPATIENT)
Dept: PHARMACY | Facility: CLINIC | Age: 24
End: 2024-03-15
Payer: MEDICAID

## 2024-03-15 ENCOUNTER — ANESTHESIA (OUTPATIENT)
Dept: OBSTETRICS AND GYNECOLOGY | Facility: HOSPITAL | Age: 24
End: 2024-03-15
Payer: MEDICAID

## 2024-03-15 ENCOUNTER — ANESTHESIA EVENT (OUTPATIENT)
Dept: OBSTETRICS AND GYNECOLOGY | Facility: HOSPITAL | Age: 24
End: 2024-03-15
Payer: MEDICAID

## 2024-03-15 LAB
ALBUMIN SERPL BCP-MCNC: 3 G/DL (ref 3.4–5)
ALP SERPL-CCNC: 169 U/L (ref 33–110)
ALT SERPL W P-5'-P-CCNC: 4 U/L (ref 7–45)
ANION GAP SERPL CALC-SCNC: 12 MMOL/L (ref 10–20)
AST SERPL W P-5'-P-CCNC: 8 U/L (ref 9–39)
BILIRUB SERPL-MCNC: 0.2 MG/DL (ref 0–1.2)
BUN SERPL-MCNC: 8 MG/DL (ref 6–23)
CALCIUM SERPL-MCNC: 8.6 MG/DL (ref 8.6–10.6)
CHLORIDE SERPL-SCNC: 107 MMOL/L (ref 98–107)
CO2 SERPL-SCNC: 20 MMOL/L (ref 21–32)
CREAT SERPL-MCNC: 0.45 MG/DL (ref 0.5–1.05)
EGFRCR SERPLBLD CKD-EPI 2021: >90 ML/MIN/1.73M*2
ERYTHROCYTE [DISTWIDTH] IN BLOOD BY AUTOMATED COUNT: 14.3 % (ref 11.5–14.5)
GLUCOSE BLD MANUAL STRIP-MCNC: 153 MG/DL (ref 74–99)
GLUCOSE BLD MANUAL STRIP-MCNC: 158 MG/DL (ref 74–99)
GLUCOSE BLD MANUAL STRIP-MCNC: 163 MG/DL (ref 74–99)
GLUCOSE BLD MANUAL STRIP-MCNC: 85 MG/DL (ref 74–99)
GLUCOSE BLD MANUAL STRIP-MCNC: 98 MG/DL (ref 74–99)
GLUCOSE SERPL-MCNC: 111 MG/DL (ref 74–99)
HCT VFR BLD AUTO: 33 % (ref 36–46)
HGB BLD-MCNC: 11.1 G/DL (ref 12–16)
LDH SERPL L TO P-CCNC: 115 U/L (ref 84–246)
MCH RBC QN AUTO: 26.3 PG (ref 26–34)
MCHC RBC AUTO-ENTMCNC: 33.6 G/DL (ref 32–36)
MCV RBC AUTO: 78 FL (ref 80–100)
NRBC BLD-RTO: 0 /100 WBCS (ref 0–0)
PLATELET # BLD AUTO: 250 X10*3/UL (ref 150–450)
POTASSIUM SERPL-SCNC: 3.4 MMOL/L (ref 3.5–5.3)
PROT SERPL-MCNC: 5.5 G/DL (ref 6.4–8.2)
RBC # BLD AUTO: 4.22 X10*6/UL (ref 4–5.2)
SODIUM SERPL-SCNC: 136 MMOL/L (ref 136–145)
TREPONEMA PALLIDUM IGG+IGM AB [PRESENCE] IN SERUM OR PLASMA BY IMMUNOASSAY: NONREACTIVE
WBC # BLD AUTO: 8 X10*3/UL (ref 4.4–11.3)

## 2024-03-15 PROCEDURE — 7210000002 HC LABOR PER HOUR

## 2024-03-15 PROCEDURE — 2500000004 HC RX 250 GENERAL PHARMACY W/ HCPCS (ALT 636 FOR OP/ED)

## 2024-03-15 PROCEDURE — 2500000005 HC RX 250 GENERAL PHARMACY W/O HCPCS: Performed by: STUDENT IN AN ORGANIZED HEALTH CARE EDUCATION/TRAINING PROGRAM

## 2024-03-15 PROCEDURE — 86780 TREPONEMA PALLIDUM: CPT

## 2024-03-15 PROCEDURE — 99199 UNLISTED SPECIAL SVC PX/RPRT: CPT

## 2024-03-15 PROCEDURE — 83615 LACTATE (LD) (LDH) ENZYME: CPT

## 2024-03-15 PROCEDURE — 01967 NEURAXL LBR ANES VAG DLVR: CPT | Performed by: ANESTHESIOLOGY

## 2024-03-15 PROCEDURE — 2500000002 HC RX 250 W HCPCS SELF ADMINISTERED DRUGS (ALT 637 FOR MEDICARE OP, ALT 636 FOR OP/ED)

## 2024-03-15 PROCEDURE — 1100000001 HC PRIVATE ROOM DAILY

## 2024-03-15 PROCEDURE — 59025 FETAL NON-STRESS TEST: CPT | Mod: GC | Performed by: STUDENT IN AN ORGANIZED HEALTH CARE EDUCATION/TRAINING PROGRAM

## 2024-03-15 PROCEDURE — 82947 ASSAY GLUCOSE BLOOD QUANT: CPT

## 2024-03-15 PROCEDURE — 84075 ASSAY ALKALINE PHOSPHATASE: CPT

## 2024-03-15 PROCEDURE — 2500000001 HC RX 250 WO HCPCS SELF ADMINISTERED DRUGS (ALT 637 FOR MEDICARE OP)

## 2024-03-15 PROCEDURE — 88307 TISSUE EXAM BY PATHOLOGIST: CPT | Performed by: STUDENT IN AN ORGANIZED HEALTH CARE EDUCATION/TRAINING PROGRAM

## 2024-03-15 PROCEDURE — 88307 TISSUE EXAM BY PATHOLOGIST: CPT | Mod: TC,SUR

## 2024-03-15 PROCEDURE — 85027 COMPLETE CBC AUTOMATED: CPT

## 2024-03-15 PROCEDURE — 59025 FETAL NON-STRESS TEST: CPT | Performed by: STUDENT IN AN ORGANIZED HEALTH CARE EDUCATION/TRAINING PROGRAM

## 2024-03-15 PROCEDURE — 59050 FETAL MONITOR W/REPORT: CPT

## 2024-03-15 PROCEDURE — 59409 OBSTETRICAL CARE: CPT

## 2024-03-15 PROCEDURE — 36415 COLL VENOUS BLD VENIPUNCTURE: CPT

## 2024-03-15 PROCEDURE — 0HQ9XZZ REPAIR PERINEUM SKIN, EXTERNAL APPROACH: ICD-10-PCS | Performed by: OBSTETRICS & GYNECOLOGY

## 2024-03-15 PROCEDURE — 2500000001 HC RX 250 WO HCPCS SELF ADMINISTERED DRUGS (ALT 637 FOR MEDICARE OP): Performed by: STUDENT IN AN ORGANIZED HEALTH CARE EDUCATION/TRAINING PROGRAM

## 2024-03-15 PROCEDURE — 10907ZC DRAINAGE OF AMNIOTIC FLUID, THERAPEUTIC FROM PRODUCTS OF CONCEPTION, VIA NATURAL OR ARTIFICIAL OPENING: ICD-10-PCS | Performed by: OBSTETRICS & GYNECOLOGY

## 2024-03-15 PROCEDURE — 51701 INSERT BLADDER CATHETER: CPT

## 2024-03-15 PROCEDURE — 59409 OBSTETRICAL CARE: CPT | Performed by: OBSTETRICS & GYNECOLOGY

## 2024-03-15 RX ORDER — DEXTROSE 50 % IN WATER (D50W) INTRAVENOUS SYRINGE
25
Status: DISCONTINUED | OUTPATIENT
Start: 2024-03-15 | End: 2024-03-16

## 2024-03-15 RX ORDER — NIFEDIPINE 10 MG/1
10 CAPSULE ORAL ONCE AS NEEDED
Status: DISCONTINUED | OUTPATIENT
Start: 2024-03-15 | End: 2024-03-15 | Stop reason: HOSPADM

## 2024-03-15 RX ORDER — MISOPROSTOL 200 UG/1
800 TABLET ORAL ONCE AS NEEDED
Status: DISCONTINUED | OUTPATIENT
Start: 2024-03-15 | End: 2024-03-15 | Stop reason: HOSPADM

## 2024-03-15 RX ORDER — CARBOPROST TROMETHAMINE 250 UG/ML
250 INJECTION, SOLUTION INTRAMUSCULAR ONCE AS NEEDED
Status: DISCONTINUED | OUTPATIENT
Start: 2024-03-15 | End: 2024-03-15 | Stop reason: HOSPADM

## 2024-03-15 RX ORDER — HYDRALAZINE HYDROCHLORIDE 20 MG/ML
5 INJECTION INTRAMUSCULAR; INTRAVENOUS ONCE AS NEEDED
Status: DISCONTINUED | OUTPATIENT
Start: 2024-03-15 | End: 2024-03-15 | Stop reason: HOSPADM

## 2024-03-15 RX ORDER — ONDANSETRON HYDROCHLORIDE 2 MG/ML
4 INJECTION, SOLUTION INTRAVENOUS EVERY 6 HOURS PRN
Status: DISCONTINUED | OUTPATIENT
Start: 2024-03-15 | End: 2024-03-16

## 2024-03-15 RX ORDER — ONDANSETRON 4 MG/1
4 TABLET, FILM COATED ORAL EVERY 6 HOURS PRN
Status: DISCONTINUED | OUTPATIENT
Start: 2024-03-15 | End: 2024-03-16

## 2024-03-15 RX ORDER — FENTANYL/BUPIVACAINE/NS/PF 2MCG/ML-.1
PLASTIC BAG, INJECTION (ML) INJECTION CONTINUOUS PRN
Status: DISCONTINUED | OUTPATIENT
Start: 2024-03-15 | End: 2024-03-15

## 2024-03-15 RX ORDER — OXYTOCIN/0.9 % SODIUM CHLORIDE 30/500 ML
2-30 PLASTIC BAG, INJECTION (ML) INTRAVENOUS CONTINUOUS
Status: DISCONTINUED | OUTPATIENT
Start: 2024-03-15 | End: 2024-03-16

## 2024-03-15 RX ORDER — LIDOCAINE HYDROCHLORIDE 10 MG/ML
30 INJECTION INFILTRATION; PERINEURAL ONCE AS NEEDED
Status: DISCONTINUED | OUTPATIENT
Start: 2024-03-15 | End: 2024-03-15 | Stop reason: HOSPADM

## 2024-03-15 RX ORDER — DEXTROSE 40 %
30 GEL (GRAM) ORAL
Status: DISCONTINUED | OUTPATIENT
Start: 2024-03-15 | End: 2024-03-16

## 2024-03-15 RX ORDER — FENTANYL/BUPIVACAINE/NS/PF 2MCG/ML-.1
PLASTIC BAG, INJECTION (ML) INJECTION AS NEEDED
Status: DISCONTINUED | OUTPATIENT
Start: 2024-03-15 | End: 2024-03-15

## 2024-03-15 RX ORDER — METOCLOPRAMIDE HYDROCHLORIDE 5 MG/ML
10 INJECTION INTRAMUSCULAR; INTRAVENOUS EVERY 6 HOURS PRN
Status: DISCONTINUED | OUTPATIENT
Start: 2024-03-15 | End: 2024-03-16

## 2024-03-15 RX ORDER — MAGNESIUM SULFATE HEPTAHYDRATE 40 MG/ML
2 INJECTION, SOLUTION INTRAVENOUS CONTINUOUS
Status: DISCONTINUED | OUTPATIENT
Start: 2024-03-15 | End: 2024-03-18 | Stop reason: HOSPADM

## 2024-03-15 RX ORDER — CALCIUM GLUCONATE 98 MG/ML
1 INJECTION, SOLUTION INTRAVENOUS ONCE AS NEEDED
Status: DISCONTINUED | OUTPATIENT
Start: 2024-03-15 | End: 2024-03-18 | Stop reason: HOSPADM

## 2024-03-15 RX ORDER — TERBUTALINE SULFATE 1 MG/ML
0.25 INJECTION SUBCUTANEOUS ONCE AS NEEDED
Status: DISCONTINUED | OUTPATIENT
Start: 2024-03-15 | End: 2024-03-15 | Stop reason: HOSPADM

## 2024-03-15 RX ORDER — DEXTROSE 40 %
15 GEL (GRAM) ORAL
Status: DISCONTINUED | OUTPATIENT
Start: 2024-03-15 | End: 2024-03-16

## 2024-03-15 RX ORDER — SODIUM CHLORIDE, SODIUM LACTATE, POTASSIUM CHLORIDE, CALCIUM CHLORIDE 600; 310; 30; 20 MG/100ML; MG/100ML; MG/100ML; MG/100ML
125 INJECTION, SOLUTION INTRAVENOUS CONTINUOUS
Status: DISCONTINUED | OUTPATIENT
Start: 2024-03-15 | End: 2024-03-18 | Stop reason: HOSPADM

## 2024-03-15 RX ORDER — LABETALOL HYDROCHLORIDE 5 MG/ML
20 INJECTION, SOLUTION INTRAVENOUS ONCE AS NEEDED
Status: COMPLETED | OUTPATIENT
Start: 2024-03-15 | End: 2024-03-15

## 2024-03-15 RX ORDER — NIFEDIPINE 30 MG/1
30 TABLET, FILM COATED, EXTENDED RELEASE ORAL
Status: DISCONTINUED | OUTPATIENT
Start: 2024-03-15 | End: 2024-03-18 | Stop reason: HOSPADM

## 2024-03-15 RX ORDER — METHYLERGONOVINE MALEATE 0.2 MG/ML
0.2 INJECTION INTRAVENOUS ONCE AS NEEDED
Status: DISCONTINUED | OUTPATIENT
Start: 2024-03-15 | End: 2024-03-15 | Stop reason: HOSPADM

## 2024-03-15 RX ORDER — LOPERAMIDE HYDROCHLORIDE 2 MG/1
4 CAPSULE ORAL EVERY 2 HOUR PRN
Status: DISCONTINUED | OUTPATIENT
Start: 2024-03-15 | End: 2024-03-15 | Stop reason: HOSPADM

## 2024-03-15 RX ORDER — ACETAMINOPHEN 325 MG/1
975 TABLET ORAL EVERY 6 HOURS
Status: DISCONTINUED | OUTPATIENT
Start: 2024-03-15 | End: 2024-03-18 | Stop reason: HOSPADM

## 2024-03-15 RX ORDER — METOCLOPRAMIDE 10 MG/1
10 TABLET ORAL EVERY 6 HOURS PRN
Status: DISCONTINUED | OUTPATIENT
Start: 2024-03-15 | End: 2024-03-16

## 2024-03-15 RX ORDER — OXYTOCIN 10 [USP'U]/ML
10 INJECTION, SOLUTION INTRAMUSCULAR; INTRAVENOUS ONCE AS NEEDED
Status: DISCONTINUED | OUTPATIENT
Start: 2024-03-15 | End: 2024-03-15 | Stop reason: HOSPADM

## 2024-03-15 RX ORDER — OXYTOCIN/0.9 % SODIUM CHLORIDE 30/500 ML
60 PLASTIC BAG, INJECTION (ML) INTRAVENOUS ONCE AS NEEDED
Status: COMPLETED | OUTPATIENT
Start: 2024-03-15 | End: 2024-03-15

## 2024-03-15 RX ORDER — IBUPROFEN 600 MG/1
600 TABLET ORAL EVERY 6 HOURS
Status: DISCONTINUED | OUTPATIENT
Start: 2024-03-15 | End: 2024-03-18 | Stop reason: HOSPADM

## 2024-03-15 RX ORDER — INSULIN LISPRO 100 [IU]/ML
0-10 INJECTION, SOLUTION INTRAVENOUS; SUBCUTANEOUS EVERY 4 HOURS
Status: DISCONTINUED | OUTPATIENT
Start: 2024-03-15 | End: 2024-03-16

## 2024-03-15 RX ORDER — TRANEXAMIC ACID 100 MG/ML
1000 INJECTION, SOLUTION INTRAVENOUS ONCE AS NEEDED
Status: DISCONTINUED | OUTPATIENT
Start: 2024-03-15 | End: 2024-03-15 | Stop reason: HOSPADM

## 2024-03-15 RX ADMIN — INSULIN LISPRO 16 UNITS: 100 INJECTION, SOLUTION INTRAVENOUS; SUBCUTANEOUS at 08:19

## 2024-03-15 RX ADMIN — MAGNESIUM SULFATE HEPTAHYDRATE 2 G/HR: 40 INJECTION, SOLUTION INTRAVENOUS at 16:26

## 2024-03-15 RX ADMIN — INSULIN LISPRO 2 UNITS: 100 INJECTION, SOLUTION INTRAVENOUS; SUBCUTANEOUS at 09:38

## 2024-03-15 RX ADMIN — SODIUM CHLORIDE, POTASSIUM CHLORIDE, SODIUM LACTATE AND CALCIUM CHLORIDE 125 ML/HR: 600; 310; 30; 20 INJECTION, SOLUTION INTRAVENOUS at 13:00

## 2024-03-15 RX ADMIN — FAMOTIDINE 20 MG: 20 TABLET, FILM COATED ORAL at 08:19

## 2024-03-15 RX ADMIN — NIFEDIPINE 30 MG: 30 TABLET, FILM COATED, EXTENDED RELEASE ORAL at 19:50

## 2024-03-15 RX ADMIN — IBUPROFEN 600 MG: 600 TABLET, FILM COATED ORAL at 19:51

## 2024-03-15 RX ADMIN — Medication 5 ML: at 15:28

## 2024-03-15 RX ADMIN — SODIUM CHLORIDE, POTASSIUM CHLORIDE, SODIUM LACTATE AND CALCIUM CHLORIDE 500 ML: 600; 310; 30; 20 INJECTION, SOLUTION INTRAVENOUS at 14:56

## 2024-03-15 RX ADMIN — Medication 5 ML: at 15:24

## 2024-03-15 RX ADMIN — LABETALOL HYDROCHLORIDE 20 MG: 5 INJECTION, SOLUTION INTRAVENOUS at 15:49

## 2024-03-15 RX ADMIN — INSULIN GLARGINE 20 UNITS: 100 INJECTION, SOLUTION SUBCUTANEOUS at 06:21

## 2024-03-15 RX ADMIN — MAGNESIUM SULFATE HEPTAHYDRATE 2 G/HR: 40 INJECTION, SOLUTION INTRAVENOUS at 22:27

## 2024-03-15 RX ADMIN — ACETAMINOPHEN 975 MG: 325 TABLET ORAL at 19:50

## 2024-03-15 RX ADMIN — PRENATAL VIT W/ FE FUMARATE-FA TAB 27-0.8 MG 1 TABLET: 27-0.8 TAB at 08:19

## 2024-03-15 RX ADMIN — Medication 60 MILLI-UNITS/MIN: at 18:39

## 2024-03-15 RX ADMIN — Medication 14 ML/HR: at 15:34

## 2024-03-15 SDOH — HEALTH STABILITY: MENTAL HEALTH: CURRENT SMOKER: 0

## 2024-03-15 ASSESSMENT — PAIN SCALES - GENERAL
PAINLEVEL_OUTOF10: 0 - NO PAIN
PAINLEVEL_OUTOF10: 0 - NO PAIN
PAINLEVEL_OUTOF10: 3
PAINLEVEL_OUTOF10: 0 - NO PAIN

## 2024-03-15 ASSESSMENT — PAIN - FUNCTIONAL ASSESSMENT
PAIN_FUNCTIONAL_ASSESSMENT: 0-10

## 2024-03-15 ASSESSMENT — PAIN DESCRIPTION - DESCRIPTORS: DESCRIPTORS: HEADACHE

## 2024-03-15 NOTE — PROGRESS NOTES
Antepartum Progress Note    Assessment/Plan   Ms. Granger is a 23 y.o.  at 36w1d by 8 wk US admitted for poorly controlled GDM     GDM, insulin controlled  - BG in past 24h: 114-192  - Current regimen: lantus 20/40, continue lispro / with meals  - Will get full dose of lantus this AM in setting of fasting , plan to eat breakfast, IOL later AM  - Initial c/f DKA; pH initially 7.38 but improved to 7.42, decreasing concern; insulin gtt deferred. BHB peak 0.64, now normalized. Urine ketones cleared. 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 Occasional mild range BP during admission  - Asymptomatic   - HELLP labs negative over multiple checks. P:C 0.45 (3/7), baseline 0.1  - Hx sPEC in prior pregnancies     Fetal status  - NST reactive this AM  - BPP  on 3/11  - EFW on 3/1: 3285g (>99%), AC >99%  - BMZ deferred in the setting of late  and poorly controlled GDM  - Hx PPH, for T&C at time of delivery  - Hx shoulder dystocia, LGA fetus. Patient previously counseled on risk of repeat shoulder dystocia, desires vaginal delivery    - Pt history of cold sores, no hx genital lesions, for SSE at time of IOL     Routine  - Cephalic 3/7  - GBS neg   - S/p TDAP and Flu   - Rubella, non-immune, for MMR postpartum   - PPBC: s/p bilateral salpingectomy prior to this pregnancy      Dispo: delivery today (3/15)    Pt to be seen and discussed with MFM Attending, Dr. Arciniega.    Alaina Franks, Medical Student    I saw and evaluated the patient and discussed with the MS3, edits made in text above. Patient seen and discussed with Dr. Demian Goldsmith MD  PGY-3, Obstetrics and Gynecology  M Pager: [phone]    Subjective   No acute events overnight. Patient has no complaints this morning. Has irregular contractions, denies VB, LOF. Reports normal  fetal movement. Denies HA, vision changes, CP, SOB, RUQ or epigastric pain. Would like to know if she can eat prior to delivery.    Objective   Last Vitals:  Temp Pulse Resp BP MAP Pulse Ox   36 °C (96.8 °F) (!) 111 18 (!) 151/99   97 %     Vitals Min/Max Last 24 Hours:  Temp  Min: 36 °C (96.8 °F)  Max: 37.6 °C (99.7 °F)  Pulse  Min: 101  Max: 118  Resp  Min: 18  Max: 18  BP  Min: 124/84  Max: 151/99    Physical Exam:  General:  AAOx3, No acute distress  Cardiovascular: Warm and well perfused  Respiratory: Normal respiratory effort     NST: Baseline 135, moderate variability, accels present, no decels  [place]: Intermittent contractions     Lab Data:            Component  Ref Range & Units 06:21  (3/15/24) 1 d ago  (3/14/24) 1 d ago  (3/14/24) 1 d ago  (3/14/24) 1 d ago  (3/14/24) 2 d ago  (3/13/24) 2 d ago  (3/13/24)   POCT Glucose  74 - 99 mg/dL 163 High  192 High  128 High  114 High  107 High  135 High  146 High

## 2024-03-15 NOTE — SIGNIFICANT EVENT
Ms. Granger is a 23 y.o.  at 36w1d by 8 wk US admitted for poorly controlled GDM, transferred to L&D for IOL.     S: Patient comfortable and ready to start IOL. Amendable to AROM. Wants to hold on starting Pitocin at this time.     O:   FHR: 130/mod/+accel/-decel   [place]: irregular   SVE: 3/50/-3, AROM'd for clear fluid   SSE: negative for herpetic lesions   BSUS: fetus in cephalic presentation     IOL  - Induction recommended in s/o uncontrolled GDMA2   - S/p AROM for clear at 1300  - Pt wanting to avoid starting Pit to see if she can start dolores on her own. Will reassess 2 hours s/p AROM   - for CEFM, Cat 1   - Hx shoulder dystocia, LGA fetus. Patient previously counseled on risk of repeat shoulder dystocia, desires vaginal delivery     GDM, insulin controlled  - BG in past 24h: 114-192  - Last regimen: lantus 20/40, continue lispro 16/16/18 with meals. 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 Occasional mild range BP during admission  - Asymptomatic   - HELLP labs negative over multiple checks. P:C 0.45 (3/7), baseline 0.1  - Hx sPEC in prior pregnancies     Fetal status  - NST reactive this AM  - For cEFM   - LGA growth pattern. EFW on 3/1: 3285g (>99%), AC >99%  - BMZ deferred in the setting of late  and poorly controlled GDM  - Pt history of cold sores, no hx genital lesions,negative SSE upon transfer     D/w Dr. Jamil Santos MD, PGY-2

## 2024-03-15 NOTE — ANESTHESIA PREPROCEDURE EVALUATION
Patient: Scooter Granger    Evaluation Method: In-person visit    Procedure Information    Date: 03/15/24  Procedure: Labor Analgesia         Relevant Problems   Cardiovascular   (+) Hypertension affecting pregnancy in third trimester      Endocrine   (+) Gestational diabetes   (+) Insulin controlled gestational diabetes mellitus (GDM) in third trimester      Neuro/Psych   (+) Depression affecting pregnancy in third trimester, antepartum       Clinical information reviewed:    Allergies  Meds               NPO Detail:  No data recorded     OB/Gyn Evaluation    Present Pregnancy    Patient is pregnant now.  (+) , gestational diabetes   Obstetric History                Physical Exam    Airway  Mallampati: III  TM distance: >3 FB  Neck ROM: full     Cardiovascular   Rhythm: regular     Dental    Pulmonary   Breath sounds clear to auscultation     Abdominal            Anesthesia Plan    History of general anesthesia?: yes  History of complications of general anesthesia?: no    ASA 3     epidural     The patient is not a current smoker.  Patient was not previously instructed to abstain from smoking on day of procedure.  Patient did not smoke on day of procedure.  Education provided regarding risk of obstructive sleep apnea.  Anesthetic plan and risks discussed with patient and mother.    Plan discussed with attending.

## 2024-03-15 NOTE — CARE PLAN
Problem: Antepartum  Goal: Minimize anxiety/maximize coping  Outcome: Progressing   The patient's goals for the shift include have a healthy baby    The clinical goals for the shift include BP < 160/110    Problem: Antepartum  Goal: Maintain pregnancy as long as maternal and/or fetal condition is stable  Outcome: Met  Flowsheets (Taken 3/15/2024 1122)  Maintain pregnancy as long as maternal and/or fetal condition is stable:   Maternal surveillance   Fetal surveillance  Goal: Avoid/minimize constipation  Outcome: Met  Goal: No decrease in circulation/VTE  Outcome: Met  Goal: FHR remains reassuring  Outcome: Met   Over the shift, the patient did make progress toward the following goals.

## 2024-03-15 NOTE — CARE PLAN
The patient's goals for the shift include sleep now and be induced tomorrow.    The clinical goals for the shift include BP <160/110      Problem: Antepartum  Goal: Maintain pregnancy as long as maternal and/or fetal condition is stable  Outcome: Progressing  Goal: Avoid/minimize constipation  Outcome: Progressing  Goal: No decrease in circulation/VTE  Outcome: Progressing  Goal: FHR remains reassuring  Outcome: Progressing  Goal: Minimize anxiety/maximize coping  Outcome: Progressing     Problem: Hypertensive Disorder of Pregnancy (HDP)  Goal: Minimal s/sx of HDP and BP<160/110  Outcome: Progressing  Goal: Adequate urine output (0.5 ml/kg/hr)  Outcome: Progressing     Problem: Pain - Adult  Goal: Verbalizes/displays adequate comfort level or baseline comfort level  Outcome: Progressing     Problem: Safety - Adult  Goal: Free from fall injury  Outcome: Progressing

## 2024-03-15 NOTE — ANESTHESIA PROCEDURE NOTES
Epidural Block    Patient location during procedure: OB  Start time: 3/15/2024 3:05 PM  End time: 3/15/2024 3:33 PM  Reason for block: labor analgesia  Staffing  Performed: resident   Authorized by: Anya Sharp MD    Performed by: Anya Sharp MD    Preanesthetic Checklist  Completed: patient identified, IV checked, risks and benefits discussed, surgical consent, pre-op evaluation, timeout performed and sterile techniques followed  Block Timeout  RN/Licensed healthcare professional reads aloud to the Anesthesia provider and entire team: Patient identity, procedure with side and site, patient position, and as applicable the availability of implants/special equipment/special requirements.  Patient on coagulant treatment: no  Timeout performed at: 3/15/2024 3:09 PM  Block Placement  Patient position: sitting  Sterility prep: cap, drape, gloves, hand and mask  Sedation level: no sedation  Patient monitoring: blood pressure, continuous pulse oximetry and heart rate  Approach: midline  Local numbing: lidocaine 1% to skin and subcutaneous tissues  Vertebral space: lumbar  Lumbar location: L3-L4  Epidural  Loss of resistance technique: saline  Guidance: landmark technique        Needle  Needle type: Tuohy   Needle gauge: 17  Needle length: 8.9cm  Needle insertion depth: 8 cm  Catheter type: end hole  Catheter size: 19 G  Catheter at skin depth: 13 cm  Catheter securement method: liquid medical adhesive and clear occlusive dressing    Test dose: lidocaine 1.5% with epinephrine 1-to-200,000  Test dose: lidocaine 1.5% with epinephrine 1-to-200,000  Test dose result: no positive test dose    PCEA  Medication concentration used: 0.044% Bupivacaine with 1.25 mcg/mL Fentanyl and [id number] Epinephrine  Dose (mL): 10  Lockout (minutes): 15  1-Hour Limit (boluses/hr): 4  Basal Rate: 14        Assessment  Sensory level: T10 bilateral  Block outcome: pain improved  Number of attempts: 1  Events: no positive test dose  Procedure  assessment: patient tolerated procedure well with no immediate complications

## 2024-03-15 NOTE — SIGNIFICANT EVENT
House officer called about patient feeling constant rectal pressure. Epidural now infusing. Also having multiple severe range blood pressures during and after epidural placement.     Cervical Exam  Dilation: 6  Effacement (%): 90  Fetal Station: -2  Method: Manual  OB Examiner: Ibeth WATSON  Fetal Assessment  Movement: Present  Mode: External US  Doppler/Fetoscope Rate: 135 BPM  Baseline Fetal Heart Rate (bpm): 135 bpm  Baseline Classification: Normal  Variability: Moderate (Between 6 and 25 BPM)  Pattern: Accelerations  FHR Category: Category I  Multiple Births: No   Fetal Decelerations: No  Contraction Frequency: occasional    A/P:     IOL  - Active labor. Plan to recheck in 2 hours  - Epidural infusing  - CEFM; Cat 1 currently  - GBS+, PCN    siPEC  - Now with severe features by sustained severe range Bps  - Treat with IV labetalol 20mg  - Start on nifed 30mg daily  - Start Mg  - Asymptomatic    Discussed with Dr. Jamil Cristina MD, PGY-1

## 2024-03-16 PROCEDURE — 2500000001 HC RX 250 WO HCPCS SELF ADMINISTERED DRUGS (ALT 637 FOR MEDICARE OP): Performed by: STUDENT IN AN ORGANIZED HEALTH CARE EDUCATION/TRAINING PROGRAM

## 2024-03-16 PROCEDURE — 2500000004 HC RX 250 GENERAL PHARMACY W/ HCPCS (ALT 636 FOR OP/ED): Performed by: STUDENT IN AN ORGANIZED HEALTH CARE EDUCATION/TRAINING PROGRAM

## 2024-03-16 PROCEDURE — 2500000002 HC RX 250 W HCPCS SELF ADMINISTERED DRUGS (ALT 637 FOR MEDICARE OP, ALT 636 FOR OP/ED): Performed by: STUDENT IN AN ORGANIZED HEALTH CARE EDUCATION/TRAINING PROGRAM

## 2024-03-16 PROCEDURE — 1100000001 HC PRIVATE ROOM DAILY

## 2024-03-16 RX ORDER — DIPHENHYDRAMINE HCL 25 MG
25 CAPSULE ORAL EVERY 6 HOURS PRN
Status: DISCONTINUED | OUTPATIENT
Start: 2024-03-16 | End: 2024-03-18 | Stop reason: HOSPADM

## 2024-03-16 RX ORDER — BISACODYL 10 MG/1
10 SUPPOSITORY RECTAL DAILY PRN
Status: DISCONTINUED | OUTPATIENT
Start: 2024-03-16 | End: 2024-03-18 | Stop reason: HOSPADM

## 2024-03-16 RX ORDER — LOPERAMIDE HYDROCHLORIDE 2 MG/1
4 CAPSULE ORAL EVERY 2 HOUR PRN
Status: DISCONTINUED | OUTPATIENT
Start: 2024-03-16 | End: 2024-03-18 | Stop reason: HOSPADM

## 2024-03-16 RX ORDER — OXYTOCIN/0.9 % SODIUM CHLORIDE 30/500 ML
60 PLASTIC BAG, INJECTION (ML) INTRAVENOUS ONCE AS NEEDED
Status: DISCONTINUED | OUTPATIENT
Start: 2024-03-16 | End: 2024-03-17

## 2024-03-16 RX ORDER — MISOPROSTOL 200 UG/1
800 TABLET ORAL ONCE AS NEEDED
Status: DISCONTINUED | OUTPATIENT
Start: 2024-03-16 | End: 2024-03-18 | Stop reason: HOSPADM

## 2024-03-16 RX ORDER — CARBOPROST TROMETHAMINE 250 UG/ML
250 INJECTION, SOLUTION INTRAMUSCULAR ONCE AS NEEDED
Status: DISCONTINUED | OUTPATIENT
Start: 2024-03-16 | End: 2024-03-18 | Stop reason: HOSPADM

## 2024-03-16 RX ORDER — LIDOCAINE 560 MG/1
1 PATCH PERCUTANEOUS; TOPICAL; TRANSDERMAL
Status: DISCONTINUED | OUTPATIENT
Start: 2024-03-16 | End: 2024-03-18 | Stop reason: HOSPADM

## 2024-03-16 RX ORDER — METHYLERGONOVINE MALEATE 0.2 MG/ML
0.2 INJECTION INTRAVENOUS ONCE AS NEEDED
Status: DISCONTINUED | OUTPATIENT
Start: 2024-03-16 | End: 2024-03-18 | Stop reason: HOSPADM

## 2024-03-16 RX ORDER — ONDANSETRON 4 MG/1
4 TABLET, FILM COATED ORAL EVERY 6 HOURS PRN
Status: DISCONTINUED | OUTPATIENT
Start: 2024-03-16 | End: 2024-03-18 | Stop reason: HOSPADM

## 2024-03-16 RX ORDER — HYDRALAZINE HYDROCHLORIDE 20 MG/ML
5 INJECTION INTRAMUSCULAR; INTRAVENOUS ONCE AS NEEDED
Status: DISCONTINUED | OUTPATIENT
Start: 2024-03-16 | End: 2024-03-18 | Stop reason: HOSPADM

## 2024-03-16 RX ORDER — LABETALOL HYDROCHLORIDE 5 MG/ML
20 INJECTION, SOLUTION INTRAVENOUS ONCE AS NEEDED
Status: DISCONTINUED | OUTPATIENT
Start: 2024-03-16 | End: 2024-03-18 | Stop reason: HOSPADM

## 2024-03-16 RX ORDER — SIMETHICONE 80 MG
80 TABLET,CHEWABLE ORAL 4 TIMES DAILY PRN
Status: DISCONTINUED | OUTPATIENT
Start: 2024-03-16 | End: 2024-03-18 | Stop reason: HOSPADM

## 2024-03-16 RX ORDER — ADHESIVE BANDAGE
10 BANDAGE TOPICAL
Status: DISCONTINUED | OUTPATIENT
Start: 2024-03-16 | End: 2024-03-18 | Stop reason: HOSPADM

## 2024-03-16 RX ORDER — DIPHENHYDRAMINE HYDROCHLORIDE 50 MG/ML
25 INJECTION INTRAMUSCULAR; INTRAVENOUS EVERY 6 HOURS PRN
Status: DISCONTINUED | OUTPATIENT
Start: 2024-03-16 | End: 2024-03-18 | Stop reason: HOSPADM

## 2024-03-16 RX ORDER — ONDANSETRON HYDROCHLORIDE 2 MG/ML
4 INJECTION, SOLUTION INTRAVENOUS EVERY 6 HOURS PRN
Status: DISCONTINUED | OUTPATIENT
Start: 2024-03-16 | End: 2024-03-18 | Stop reason: HOSPADM

## 2024-03-16 RX ORDER — NIFEDIPINE 10 MG/1
10 CAPSULE ORAL ONCE AS NEEDED
Status: DISCONTINUED | OUTPATIENT
Start: 2024-03-16 | End: 2024-03-18 | Stop reason: HOSPADM

## 2024-03-16 RX ORDER — OXYTOCIN 10 [USP'U]/ML
10 INJECTION, SOLUTION INTRAMUSCULAR; INTRAVENOUS ONCE AS NEEDED
Status: DISCONTINUED | OUTPATIENT
Start: 2024-03-16 | End: 2024-03-17

## 2024-03-16 RX ORDER — POLYETHYLENE GLYCOL 3350 17 G/17G
17 POWDER, FOR SOLUTION ORAL 2 TIMES DAILY PRN
Status: DISCONTINUED | OUTPATIENT
Start: 2024-03-16 | End: 2024-03-18 | Stop reason: HOSPADM

## 2024-03-16 RX ORDER — ENOXAPARIN SODIUM 100 MG/ML
40 INJECTION SUBCUTANEOUS EVERY 24 HOURS
Status: DISCONTINUED | OUTPATIENT
Start: 2024-03-16 | End: 2024-03-18 | Stop reason: HOSPADM

## 2024-03-16 RX ORDER — OXYTOCIN 10 [USP'U]/ML
10 INJECTION, SOLUTION INTRAMUSCULAR; INTRAVENOUS ONCE AS NEEDED
Status: DISCONTINUED | OUTPATIENT
Start: 2024-03-16 | End: 2024-03-18 | Stop reason: HOSPADM

## 2024-03-16 RX ORDER — TRANEXAMIC ACID 100 MG/ML
1000 INJECTION, SOLUTION INTRAVENOUS ONCE AS NEEDED
Status: DISCONTINUED | OUTPATIENT
Start: 2024-03-16 | End: 2024-03-18 | Stop reason: HOSPADM

## 2024-03-16 RX ADMIN — NIFEDIPINE 30 MG: 30 TABLET, FILM COATED, EXTENDED RELEASE ORAL at 06:55

## 2024-03-16 RX ADMIN — ACETAMINOPHEN 975 MG: 325 TABLET ORAL at 20:34

## 2024-03-16 RX ADMIN — IBUPROFEN 600 MG: 600 TABLET, FILM COATED ORAL at 08:43

## 2024-03-16 RX ADMIN — IBUPROFEN 600 MG: 600 TABLET, FILM COATED ORAL at 02:33

## 2024-03-16 RX ADMIN — BENZOCAINE AND LEVOMENTHOL 1 APPLICATION: 200; 5 SPRAY TOPICAL at 20:34

## 2024-03-16 RX ADMIN — MAGNESIUM SULFATE HEPTAHYDRATE 2 G/HR: 40 INJECTION, SOLUTION INTRAVENOUS at 07:39

## 2024-03-16 RX ADMIN — DIPHENHYDRAMINE HYDROCHLORIDE 25 MG: 25 CAPSULE ORAL at 01:34

## 2024-03-16 RX ADMIN — ACETAMINOPHEN 975 MG: 325 TABLET ORAL at 02:33

## 2024-03-16 RX ADMIN — ACETAMINOPHEN 975 MG: 325 TABLET ORAL at 08:43

## 2024-03-16 RX ADMIN — ACETAMINOPHEN 975 MG: 325 TABLET ORAL at 14:41

## 2024-03-16 RX ADMIN — IBUPROFEN 600 MG: 600 TABLET, FILM COATED ORAL at 14:43

## 2024-03-16 RX ADMIN — ENOXAPARIN SODIUM 40 MG: 100 INJECTION SUBCUTANEOUS at 08:38

## 2024-03-16 RX ADMIN — IBUPROFEN 600 MG: 600 TABLET, FILM COATED ORAL at 20:34

## 2024-03-16 ASSESSMENT — PAIN SCALES - GENERAL
PAINLEVEL_OUTOF10: 0 - NO PAIN
PAINLEVEL_OUTOF10: 2
PAINLEVEL_OUTOF10: 3
PAINLEVEL_OUTOF10: 2
PAINLEVEL_OUTOF10: 0 - NO PAIN
PAINLEVEL_OUTOF10: 0 - NO PAIN
PAIN_LEVEL: 2
PAINLEVEL_OUTOF10: 0 - NO PAIN
PAINLEVEL_OUTOF10: 2
PAINLEVEL_OUTOF10: 2

## 2024-03-16 ASSESSMENT — PAIN DESCRIPTION - LOCATION: LOCATION: PERINEUM

## 2024-03-16 ASSESSMENT — PAIN - FUNCTIONAL ASSESSMENT: PAIN_FUNCTIONAL_ASSESSMENT: 0-10

## 2024-03-16 NOTE — INDIVIDUALIZED OVERALL PLAN OF CARE NOTE
"S: patient is feeling tired, otherwise, denies HA, change in vision, SOB, CP and RUQ pain    O:   ./67   Pulse 98   Temp 36.9 °C (98.4 °F) (Temporal)   Resp 18   Ht 1.651 m (5' 5\")   Wt 101 kg (223 lb 1.7 oz)   SpO2 96%   Breastfeeding Yes   BMI 37.13 kg/m²     General: no acute distress   HEENT: normocephalic, atraumatic   Cards: RRR   Pulm: lungs CTAB    Abdomen: soft, no RUQ pain, fundus is below umbilicus    : normal external anatomy   Extremities: no lesions, skin discoloration or calf tenderness   Neuro: no focal deficits, 2+ reflexes bilateral      A    23 y.o G5 now  PPD#1 s/p  at 36.01 wga in s/o poorly controlled GMDA2 and siPEC w/SF c/b shoulder dystocia     siPEC w/SF  -dx severe range BP requiring IV intervention  - currently normotensive BP  - Nifed 30mg   - Asymptomatic  - on 24hr Mg until 1830 (3/16)  - HELLP labs neg     Postpartum Care  - continue postpartum care  - [facility]: s/p BTL (pregnancy occurred after BTL)   - Recommend breastfeeding   - h/o GDMA2 in pregnancy, for 2hr gtt     Dispo: patient is stable for transfer to [facility] for 24 hr postpartum magnesium    Nette Barrow MD, PGY-2   "

## 2024-03-16 NOTE — CARE PLAN
Problem: Safety - Adult  Goal: Free from fall injury  Outcome: Progressing     Problem: Postpartum  Goal: Experiences normal postpartum course  Outcome: Progressing  Goal: Appropriate maternal -  bonding  Outcome: Progressing   The patient's goals for the shift include Get some rest    The clinical goals for the shift include The clinical goals for the shift include maintain BP <160/110 .

## 2024-03-16 NOTE — L&D DELIVERY NOTE
OB Delivery Note  3/16/2024  Scooter Granger  23 y.o.   Vaginal, Spontaneous        Gestational Age: 36w1d  /Para:   Quantitative Blood Loss: Admission to Discharge: 84 mL (3/7/2024 11:44 AM - 3/16/2024 12:25 AM)     Olayinka Granger [[id number]]      Labor Events    Rupture date/time: 3/15/2024 1256  Rupture type: Artificial  Fluid color: Clear  Fluid odor: None  Labor type: Induced Onset of Labor  Labor allowed to proceed with plans for an attempted vaginal birth?: Yes  Induction: AROM  First cervical ripening date/time: 3/15/2024 1256  Induction date/time: 3/15/2024 1140  Induction indications: Diabetes  Complications: Shoulder Dystocia       Labor Event Times    Labor onset date/time: 3/15/2024 1140  Dilation complete date/time: 3/15/2024 1828  Start pushing date/time: 3/15/2024 1833       Labor Length    1st stage: 6h 48m  2nd stage: 0h 08m  3rd stage: 0h 05m       Placenta    Placenta delivery date/time: 3/15/2024 1841  Placenta removal: Spontaneous  Placenta appearance: Intact  Placenta disposition: pathology       Cord    Vessels: 3 vessels  Complications: None  Delayed cord clamping?: Yes  Cord clamped date/time: 3/15/2024 1837  Cord blood disposition: Lab  Gases sent?: No  Stem cell collection (by provider): No       Lacerations    Perineal laceration: 1st  Other lacerations?: No  Repair suture: 3-0 Synthetic Suture       Anesthesia    Method: Epidural       Operative Delivery    Forceps attempted?: No  Vacuum extractor attempted?: No       Shoulder Dystocia    Shoulder dystocia present?: Yes  Time recognized: 3/15/2024 18:35:00  First maneuver: Orion maneuver  Second maneuver: suprapubic pressure        Delivery    Time head delivered: 3/15/2024 18:35:00  Birth date/time: 3/15/2024 18:36:00  Delivery type: Vaginal, Spontaneous  Complications: Shoulder Dystocia       Resuscitation    Method: Tactile stimulation, Suctioning       Apgars    Living status: Living  Apgar  Component Scores:  1 min.:  5 min.:  10 min.:  15 min.:  20 min.:    Skin color:  0  0  1      Heart rate:  2  2  2      Reflex irritability:  2  2  2      Muscle tone:  2  2  2      Respiratory effort:  2  2  2      Total:  8  8  9      Apgars assigned by: YUDITH       Delivery Providers    Delivering clinician: Radha Luke MD   Provider Role    Emilia Madrigal, RN Delivery Nurse    Hodan Diamond, RN Nursery Nurse    Nette Barrow MD Resident               Patient had a normal spontaneous vaginal delivery that was complicated by a shoulder dystocia. Shoulder dystocia relieved by McRobert's and suprapubic pressure maneuvers. EBL 150cc. Patient had a 1st degree laceration that was repaired with 3-0 vicryl (see delivery summary for details).     Nette Barrow MD, PGY-2

## 2024-03-16 NOTE — ANESTHESIA POSTPROCEDURE EVALUATION
Patient: Scooter Granger    Procedure Summary       Date: 03/15/24 Room / Location:     Anesthesia Start: 1505 Anesthesia Stop:     Procedure: Labor Analgesia Diagnosis:     Scheduled Providers:  Responsible Provider: Ilene Altamirano MD    Anesthesia Type: epidural ASA Status: 3            Anesthesia Type: epidural        Anesthesia Post Evaluation    Patient location during evaluation: floor  Patient participation: complete - patient participated  Level of consciousness: awake  Pain score: 2  Pain management: adequate  Multimodal analgesia pain management approach  Airway patency: patent  Two or more strategies used to mitigate risk of obstructive sleep apnea  Cardiovascular status: acceptable  Respiratory status: acceptable  Hydration status: acceptable  Postoperative Nausea and Vomiting: none  Comments: Scooter Granger is a 23 y.o., , who had a Vaginal, Spontaneous delivery on 3/15/2024 at 36w1d and is now POD1.    She had Neuraxial Anesthesia without immediate complications noted.       Pain well controlled    --------------------            24               0739     --------------------   BP:       130/70     Pulse:      94       Resp:       18       Temp:                SpO2:      96%      --------------------    Neuraxial site assessed. No visible redness or swelling or drainage. Patient able to ambulate and move all extremities without difficulty. Able to void. No complaints of nausea/vomiting. Tolerating PO intake well. No s/sx of PDPH.     Anesthesia will sign off     Paramjit Worley, DO           No notable events documented.

## 2024-03-16 NOTE — PROGRESS NOTES
Postpartum Progress Note    Assessment/Plan   23 y.o G5 now  PPD#1 s/p  at 36.1 wga in s/o poorly controlled GMDA2 and siPEC w/SF c/b shoulder dystocia      siPEC w/SF  - Dx severe range BP requiring IV intervention  - currently normotensive BP  - Nifed 30mg   - Asymptomatic  - on 24hr Mg until 1830 (3/16)  - HELLP labs neg      Postpartum Care  - continue postpartum care  - [facility]: s/p BTL (pregnancy occurred after BTL)   - Recommend breastfeeding   - h/o GDMA2 in pregnancy, for 2hr gtt in postpartum period prior to to discharge   - H/o depression, to continue home Seroquel     Dispo: At time of discharge, for fuv in 2-5 days for BP check and ppv in 4-6 wk     D/w and seen by Dr. Delma Santos MD, PGY-2     Subjective   Her pain is well controlled with current medications  She is passing flatus  She is not ambulating IN S/O ppMag  She is tolerating a Adult diet Regular  She reports no breast or nursing problems  She denies emotional concerns today   She denies HA, scotomas, blurry vision, CP, SOB and RUQ pain.        Objective   Allergies:   Patient has no known allergies.         Last Vitals:  Temp Pulse Resp BP MAP Pulse Ox   36.4 °C (97.5 °F) 97 18 134/86   100 %     Vitals Min/Max Last 24 Hours:  Temp  Min: 36.3 °C (97.3 °F)  Max: 36.9 °C (98.4 °F)  Pulse  Min: 86  Max: 150  Resp  Min: 14  Max: 20  BP  Min: 111/70  Max: 180/125    Intake/Output:     Intake/Output Summary (Last 24 hours) at 3/16/2024 1029  Last data filed at 3/16/2024 0930  Gross per 24 hour   Intake 3170.7 ml   Output 2659 ml   Net 511.7 ml       Physical Exam:  Constitutional: no visible distress, alert and cooperative  Eyes: clear sclera  Head/Neck: normocephalic  Respiratory/Thorax: normal respiratory effort on RA  Cardiovascular: regular rate   Gastrointestinal: abdomen soft, fundus firm below umbilicus   Musculoskeletal: grossly normal ROM  Extremities: BLEs symmetrical   Neurological: no gross deficits appreciated    Psychological: Appropriate mood and behavior  Skin: warm, dry, no lesions    Lab Data:  Lab Results   Component Value Date    WBC 8.0 03/15/2024    HGB 11.1 (L) 03/15/2024    HCT 33.0 (L) 03/15/2024     03/15/2024

## 2024-03-17 PROBLEM — O14.10 PRE-ECLAMPSIA, SEVERE (HHS-HCC): Status: ACTIVE | Noted: 2024-03-17

## 2024-03-17 PROBLEM — O11.9 CHRONIC HYPERTENSION WITH SUPERIMPOSED PREECLAMPSIA (HHS-HCC): Status: ACTIVE | Noted: 2024-03-17

## 2024-03-17 LAB
BLOOD EXPIRATION DATE: NORMAL
BLOOD EXPIRATION DATE: NORMAL
DISPENSE STATUS: NORMAL
DISPENSE STATUS: NORMAL
PRODUCT BLOOD TYPE: 5100
PRODUCT BLOOD TYPE: 5100
PRODUCT CODE: NORMAL
PRODUCT CODE: NORMAL
UNIT ABO: NORMAL
UNIT ABO: NORMAL
UNIT NUMBER: NORMAL
UNIT NUMBER: NORMAL
UNIT RH: NORMAL
UNIT RH: NORMAL
UNIT VOLUME: 305
UNIT VOLUME: 350
XM INTEP: NORMAL
XM INTEP: NORMAL

## 2024-03-17 PROCEDURE — 2500000002 HC RX 250 W HCPCS SELF ADMINISTERED DRUGS (ALT 637 FOR MEDICARE OP, ALT 636 FOR OP/ED): Performed by: STUDENT IN AN ORGANIZED HEALTH CARE EDUCATION/TRAINING PROGRAM

## 2024-03-17 PROCEDURE — 2500000004 HC RX 250 GENERAL PHARMACY W/ HCPCS (ALT 636 FOR OP/ED): Performed by: STUDENT IN AN ORGANIZED HEALTH CARE EDUCATION/TRAINING PROGRAM

## 2024-03-17 PROCEDURE — 1100000001 HC PRIVATE ROOM DAILY

## 2024-03-17 PROCEDURE — 2500000001 HC RX 250 WO HCPCS SELF ADMINISTERED DRUGS (ALT 637 FOR MEDICARE OP): Performed by: STUDENT IN AN ORGANIZED HEALTH CARE EDUCATION/TRAINING PROGRAM

## 2024-03-17 RX ADMIN — IBUPROFEN 600 MG: 600 TABLET, FILM COATED ORAL at 14:57

## 2024-03-17 RX ADMIN — ENOXAPARIN SODIUM 40 MG: 100 INJECTION SUBCUTANEOUS at 08:48

## 2024-03-17 RX ADMIN — ACETAMINOPHEN 975 MG: 325 TABLET ORAL at 02:47

## 2024-03-17 RX ADMIN — NIFEDIPINE 30 MG: 30 TABLET, FILM COATED, EXTENDED RELEASE ORAL at 06:31

## 2024-03-17 RX ADMIN — ACETAMINOPHEN 975 MG: 325 TABLET ORAL at 08:48

## 2024-03-17 RX ADMIN — ACETAMINOPHEN 975 MG: 325 TABLET ORAL at 21:46

## 2024-03-17 RX ADMIN — IBUPROFEN 600 MG: 600 TABLET, FILM COATED ORAL at 08:48

## 2024-03-17 RX ADMIN — ACETAMINOPHEN 975 MG: 325 TABLET ORAL at 14:57

## 2024-03-17 RX ADMIN — IBUPROFEN 600 MG: 600 TABLET, FILM COATED ORAL at 02:47

## 2024-03-17 RX ADMIN — IBUPROFEN 600 MG: 600 TABLET, FILM COATED ORAL at 21:46

## 2024-03-17 ASSESSMENT — PAIN SCALES - GENERAL
PAINLEVEL_OUTOF10: 0 - NO PAIN
PAINLEVEL_OUTOF10: 0 - NO PAIN
PAINLEVEL_OUTOF10: 2
PAINLEVEL_OUTOF10: 1
PAINLEVEL_OUTOF10: 0 - NO PAIN
PAINLEVEL_OUTOF10: 2

## 2024-03-17 ASSESSMENT — PAIN DESCRIPTION - DESCRIPTORS
DESCRIPTORS: SORE;CRAMPING;DISCOMFORT
DESCRIPTORS: CRAMPING;DISCOMFORT;SORE

## 2024-03-17 NOTE — PROGRESS NOTES
DYLAN met with Ms. Granger, mother of baby Elkin. Also present was FOB. MOB reports that she has necessary baby supplies and a support system to help her with the baby. DYLAN discussed post partum depression. MOB stated that she has a counselor and will probably resume her psychotropic medication. She is currently feeling good emotionally after this birth. She had no further questions or concerns. Clear for discharge when medically ready.      STACY Ponce

## 2024-03-17 NOTE — PROGRESS NOTES
Postpartum Progress Note    Assessment/Plan   Scooter Granger is a 23 y.o., , who delivered at 36w1d gestation and is now postpartum day 2.    Now PPD#1 s/p  c/b shoulder dystocia on 3/15  - continue routine postpartum care  - pain well controlled on po medications  - DVT Score: 5, for ppx lovenox  - Hgb:   Results from last 7 days   Lab Units 03/15/24  0808   HEMOGLOBIN g/dL 11.1*     siPEC w/SF  - Dx severe range BP requiring IV intervention, cHTN on no meds previously  - Largely normotensive on nifedipine 30 mg  - Asymptomatic  - s/p Mg  - HELLP labs neg     GDMA2  - 2 hr OGTT at PPV    Maternal Well-Being  - emotional support provided  - bonding with infant  - Depression- Appt with therapist every Thursday, not taking seroquel, mood stable    [place] Feeding  - breastfeeding/pumping encouraged; lactation consult prn    Contraception  - s/p BTL    Dispo  - Anticipate DC PPD3 pending BP control.  - The signs and symptoms of PEC were reviewed with the patient, including unrelenting headache, vision changes/blurred vision, and pain underneath the right breast.   - BP cuff for home for checking BP BID. Pt instructed to call primary OB if SBP > 160 or DBP > 110.  - On discharge, follow up with primary OB in 2-5 days for BP check, 2 weeks for incision check, and 4-6 weeks for postpartum visit.      Principal Problem:    Gestational diabetes  Active Problems:    Pre-eclampsia, severe    Chronic hypertension with superimposed preeclampsia    Pregnancy Problems (from 10/19/23 to present)       Problem Noted Resolved    Labor and delivery indication for care or intervention 2024 by Cherry Flores MD No    Priority:  Medium      History of postpartum hemorrhage, currently pregnant 2024 by Renetta Telles MD No    Priority:  Medium      History of shoulder dystocia in prior pregnancy, currently pregnant 2024 by Renetta Telles MD No    Priority:  Medium      Overview Addendum 2024  1:55 PM by  Renetta Telles MD     2021, 36.0wks, sPEC, A1DM   No residual deficits in that child, no shoulder in x2 35w deliveries (P1 and P3)         Insulin controlled gestational diabetes mellitus (GDM) in third trimester 1/26/2024 by Renetta Baldwin, RN No    Priority:  Medium      Overview Addendum 3/6/2024  9:00 AM by Karlene Smiley MD     By 1-hour 244    [x] MFM Consult/education  [ ] Serial growth ultrasounds   2/1 Growth US: EFW 1635g 27%, AC 11%. AFV normal    Fetal surveillance:  [x] Weekly at 32 weeks  [] Twice weekly at 36 weeks      2/5/2024 Begin NPH 10/16  2/8/2024: Switched to Lantus 10/16  3/1/2024: Lantus 34 nightly         35 weeks gestation of pregnancy 1/16/2024 by PRASANTH Avery-CNP No    Priority:  Medium      Overview Addendum 3/7/2024 11:19 AM by Abdon Rothman MD     Dating:   [x] Initial BMI: 35  [x] Prenatal Labs: reviewed and wnl (11/2)  [x] Aneuploidy Screening: RR First Check- normal quad screen (after corrected for GA)  [x] Baby ASA: compliant  [x] Anatomy US: wnl   [x] 1hr GCT at 24-28wks: 1-hr 244, GDM   [x] Tdap (27-36wks): 2/8/2024   [x] Flu Shot: 9/2023  [x] COVID vaccine:  booster 2/1/24  [x] Rhogam (if Rh neg): Rh+ not indicated   [x] GBS at 36 wks: 2/22: GBS negative  [x] Breastfeeding: yes, wants to buy hands free pump, declined sending rx  [x] PPBC: s/p BS (was pregnant right before tubal, had negative UPT prior)  [] Mode of delivery:  anticipate vaginal, had prior PPH with first child and Shoulder dystocia with P2, no residual deficits for that child         History of pre-eclampsia in prior pregnancy, currently pregnant 1/16/2024 by PRASANTH Avery-CNP No    Priority:  Medium      Overview Addendum 2/1/2024  1:55 PM by Renetta Telles MD     On Asa ppx         Hypertension affecting pregnancy in third trimester 11/2/2023 by Dylon Song MD No    Priority:  Medium      Overview Addendum 3/7/2024 11:11 AM by Abdon Rothman MD     Not on meds, asa ppx  Baseline PEC  labs wnl, 1/16 P:C 0.11  [ ] Serial growth: 3/1: 3285g (>99%ile), AC >99%ile           Depression affecting pregnancy in third trimester, antepartum 10/18/2023 by Anne Marie Vera No    Priority:  Medium      Overview Addendum 2/1/2024  1:55 PM by Renetta Telles MD     - on seroquel outside of pregnancy, not taking currently  Mood good          Gestational diabetes 3/7/2024 by Renetta Charles MD No    Pelvic pain affecting pregnancy in second trimester, antepartum 10/19/2023 by Zoila Wolf MD 3/7/2024 by Renetta Charles MD    Priority:  Medium      Overview Signed 10/19/2023 12:53 PM by Zoila Wolf MD     - s/p ED visits x2, elevated WBC otherwise labs wnl  - CT unable to be completed to r/o appendicitis   - symptoms improved today                Hospital course: postpartum preeclampsia/eclampsia  Vaginal Birth   The patient's blood type is O POS. The baby's blood type is O POS . Rhogam is not indicated.    Subjective   Her pain is well controlled with current medications  She is passing flatus  She is ambulating well  She is tolerating a Adult diet Regular  NPO Diet; Effective midnight  She reports no breast or nursing problems  She denies emotional concerns today   Her plan for contraception is tubal ligation     Denies HA, SOB, RUQ pain, vision changes.      Objective   Allergies:   Patient has no known allergies.         Last Vitals:  Temp Pulse Resp BP MAP Pulse Ox   37.1 °C (98.8 °F) 100 18 130/78   95 %     Vitals Min/Max Last 24 Hours:  Temp  Min: 36.3 °C (97.3 °F)  Max: 37.1 °C (98.8 °F)  Pulse  Min: 89  Max: 103  Resp  Min: 14  Max: 18  BP  Min: 110/73  Max: 138/82    Intake/Output:   No intake or output data in the 24 hours ending 03/17/24 1741    Physical Exam:  General: Examination reveals a well developed, well nourished, female, in no acute distress. She is alert and cooperative.  Lungs: symmetrical, non-labored breathing.  Cardiac: warm, well-perfused.  Abdomen: soft, non-tender.  Fundus: firm, at  umbilicus, and nontender.  Extremities: no redness or tenderness in the calves or thighs.  Neurological: alert, oriented, normal speech, no focal findings or movement disorder noted.     Lab Data:  Labs in chart were reviewed.

## 2024-03-17 NOTE — CARE PLAN
The patient's goals for the shift include Pt will maintain BP WNL through end of shift 3/17/2024 0700.    The clinical goals for the shift include Pt will remain safe and free of injury through end of shift 3/17/2024 0700.    Over the shift, the patient did not make progress toward the following goals. Barriers to progression include n/a. Recommendations to address these barriers include n/a.

## 2024-03-17 NOTE — LACTATION NOTE
"Lactation Consultant Note  Lactation Consultation  Reason for Consult: Initial assessment, Late  infant (s/p nicu admit)  Consultant Name: Yomaira Bryant Rn IBCLC    Maternal Information  Has mother  before?: Yes  Previous Maternal Breastfeeding Challenges: Other (Comment), Infant separation (late pre- term deliveries x3)    Maternal Assessment  Breast Assessment:  (deferred)  Nipple Assessment:  (deferred)    Infant Assessment  Infant Behavior: Deep sleep (in mothers arms)  Infant Assessment:  (suck not assessed this visit)    Feeding Assessment  Nutrition Source: Donor human milk, Breastmilk  Feeding Method: Nursing at the breast  Unable to assess infant feeding at this time: Maternal request (mother stated that she had fed infant at her breast one hour ago)    LATCH TOOL       Breast Pump  Pump: Hospital grade electric pump, Double breast pumping  Frequency: 8-10 times per day (instructed to pump after feedings and/or attempts)  Duration: Initiate phase  Breast Shield Size and Type: 24 mm (nipples not sized mother stated that is size she had been using)    Other OB Lactation Tools       Patient Follow-up  Inpatient Lactation Follow-up Needed : Yes    Other OB Lactation Documentation  Maternal Risk Factors: Diabetes (gestational, types 1 or 2),  delivery <37 weeks, Hypertension  Infant Risk Factors: Prematurity <37 weeks    Recommendations/Summary  Visit was brief with mother. She was on phone with FOB. She reported that her infant was admitted back to the normal  nursery around 1300 today.She shared that she had latched him around that time and she felt that her infant fed well. Questioned mother as to the assertiveness of infant with this feeding.She replied \"he did fine.\" Mother had not started pumping until this morning when her RN set her up and instructed her on using the pump. She stated that she had breast fed and pumped with her older late  infants and was familiar " with the pump use and frequency of feeding these infants. Encouraged mother to offer infant feedings frequently every 2-3 hours. Instructed to double pump after each feeding sessions whether infant nurses well or not. Mother expressed to me her question as to what time she would be discharged tomorrow. Reviewed with mother that sometimes a late  infant may stay a few extra days for observation and if any medical issues arise that may delay the discharge. Instructed mother to call when next nursing for a feeding observation. Mother has a breast pump for home.

## 2024-03-17 NOTE — CARE PLAN
The patient's goals for the shift include   Problem: Safety - Adult  Goal: Free from fall injury  Outcome: Progressing     Problem: Postpartum  Goal: Experiences normal postpartum course  Outcome: Progressing  Goal: Appropriate maternal -  bonding  Outcome: Progressing     Problem: Hypertensive Disorder of Pregnancy (HDP)  Goal: Minimal s/sx of HDP and BP<160/110  Outcome: Progressing           VSS, bleeding and swelling minimal, pain controlled with medications, IV flushed, breastfeeding/pumping/donor milk.

## 2024-03-18 ENCOUNTER — PHARMACY VISIT (OUTPATIENT)
Dept: PHARMACY | Facility: CLINIC | Age: 24
End: 2024-03-18
Payer: MEDICAID

## 2024-03-18 VITALS
HEART RATE: 86 BPM | BODY MASS INDEX: 37.17 KG/M2 | WEIGHT: 223.11 LBS | RESPIRATION RATE: 20 BRPM | SYSTOLIC BLOOD PRESSURE: 144 MMHG | HEIGHT: 65 IN | DIASTOLIC BLOOD PRESSURE: 90 MMHG | OXYGEN SATURATION: 96 % | TEMPERATURE: 98.1 F

## 2024-03-18 PROCEDURE — 2500000004 HC RX 250 GENERAL PHARMACY W/ HCPCS (ALT 636 FOR OP/ED): Performed by: STUDENT IN AN ORGANIZED HEALTH CARE EDUCATION/TRAINING PROGRAM

## 2024-03-18 PROCEDURE — 2500000001 HC RX 250 WO HCPCS SELF ADMINISTERED DRUGS (ALT 637 FOR MEDICARE OP): Performed by: STUDENT IN AN ORGANIZED HEALTH CARE EDUCATION/TRAINING PROGRAM

## 2024-03-18 PROCEDURE — 90471 IMMUNIZATION ADMIN: CPT | Performed by: STUDENT IN AN ORGANIZED HEALTH CARE EDUCATION/TRAINING PROGRAM

## 2024-03-18 PROCEDURE — RXMED WILLOW AMBULATORY MEDICATION CHARGE

## 2024-03-18 PROCEDURE — 90707 MMR VACCINE SC: CPT | Performed by: STUDENT IN AN ORGANIZED HEALTH CARE EDUCATION/TRAINING PROGRAM

## 2024-03-18 PROCEDURE — 99231 SBSQ HOSP IP/OBS SF/LOW 25: CPT | Performed by: NURSE PRACTITIONER

## 2024-03-18 PROCEDURE — 2500000002 HC RX 250 W HCPCS SELF ADMINISTERED DRUGS (ALT 637 FOR MEDICARE OP, ALT 636 FOR OP/ED): Performed by: STUDENT IN AN ORGANIZED HEALTH CARE EDUCATION/TRAINING PROGRAM

## 2024-03-18 RX ORDER — IBUPROFEN 600 MG/1
600 TABLET ORAL EVERY 6 HOURS PRN
Qty: 90 TABLET | Refills: 0 | Status: SHIPPED | OUTPATIENT
Start: 2024-03-18

## 2024-03-18 RX ORDER — POLYETHYLENE GLYCOL 3350 17 G/17G
17 POWDER, FOR SOLUTION ORAL DAILY PRN
Qty: 510 G | Refills: 0 | Status: SHIPPED | OUTPATIENT
Start: 2024-03-18 | End: 2024-04-17

## 2024-03-18 RX ORDER — POLYETHYLENE GLYCOL 3350 17 G/17G
17 POWDER, FOR SOLUTION ORAL 2 TIMES DAILY PRN
Qty: 14 PACKET | Refills: 0 | Status: SHIPPED | OUTPATIENT
Start: 2024-03-18 | End: 2024-03-18 | Stop reason: HOSPADM

## 2024-03-18 RX ORDER — NIFEDIPINE 30 MG/1
30 TABLET, FILM COATED, EXTENDED RELEASE ORAL
Qty: 42 TABLET | Refills: 0 | Status: SHIPPED | OUTPATIENT
Start: 2024-03-18

## 2024-03-18 RX ORDER — ACETAMINOPHEN 325 MG/1
975 TABLET ORAL EVERY 6 HOURS PRN
Qty: 120 TABLET | Refills: 0 | Status: SHIPPED | OUTPATIENT
Start: 2024-03-18

## 2024-03-18 RX ADMIN — MEASLES, MUMPS, AND RUBELLA VIRUS VACCINE LIVE 0.5 ML: 1000; 12500; 1000 INJECTION, POWDER, LYOPHILIZED, FOR SUSPENSION SUBCUTANEOUS at 13:39

## 2024-03-18 RX ADMIN — IBUPROFEN 600 MG: 600 TABLET, FILM COATED ORAL at 09:44

## 2024-03-18 RX ADMIN — NIFEDIPINE 30 MG: 30 TABLET, FILM COATED, EXTENDED RELEASE ORAL at 06:52

## 2024-03-18 RX ADMIN — ACETAMINOPHEN 975 MG: 325 TABLET ORAL at 03:31

## 2024-03-18 RX ADMIN — ACETAMINOPHEN 975 MG: 325 TABLET ORAL at 09:44

## 2024-03-18 RX ADMIN — ENOXAPARIN SODIUM 40 MG: 100 INJECTION SUBCUTANEOUS at 09:44

## 2024-03-18 RX ADMIN — IBUPROFEN 600 MG: 600 TABLET, FILM COATED ORAL at 03:31

## 2024-03-18 ASSESSMENT — PAIN SCALES - GENERAL
PAINLEVEL_OUTOF10: 0 - NO PAIN

## 2024-03-18 NOTE — DISCHARGE SUMMARY
Discharge Summary    Admission Date: 3/7/2024  Discharge Date: 24  Discharge Diagnosis: Gestational diabetes     Patient Active Problem List   Diagnosis    Depression affecting pregnancy in third trimester, antepartum    Hypertension affecting pregnancy in third trimester    35 weeks gestation of pregnancy    History of pre-eclampsia in prior pregnancy, currently pregnant    Insulin controlled gestational diabetes mellitus (GDM) in third trimester    History of postpartum hemorrhage, currently pregnant    History of shoulder dystocia in prior pregnancy, currently pregnant    Labor and delivery indication for care or intervention    Gestational diabetes    Pre-eclampsia, severe    Chronic hypertension with superimposed preeclampsia       Hospital Course  Scooter Granger is a 23 y.o.,     Initially presented for: elevated BG and BP in office    Admission Date: 3/7/2024    Delivery Date: 3/15/2024  6:36 PM     Delivery type: Vaginal, Spontaneous      GA at delivery: 36w1d    Outcome: Living     Anesthesia during delivery: Epidural     Intrapartum complications: Shoulder Dystocia     Feeding method: Breastfeeding Status: Yes    Contraception: s/p tubal ligation or salpingectomy    Rhogam: The patient's blood type is O POS. The baby's blood type is O POS . Rhogam is not indicated.     Now postpartum day: 3.    Hospital course n/f:    siPEC w/SF  - Dx severe range BP requiring IV intervention, cHTN on no meds previously  - Largely normotensive on nifedipine 30 mg with some low mild  - Asymptomatic  - s/p Mg  - HELLP labs neg     Discussed continued inpatient management of BP's vs close outpt followup and risks/benefits. Pt prefers close outpatient follow up.     PP course otherwise uneventful.  Meeting all postpartum milestones- ambulating independently, passing flatus, tolerating PO intake, lochia light, voiding spontaneously, and pain well controlled with PO meds.     Dispo  -OK for DC today  using  shared decision making  - The signs and symptoms of PEC were reviewed with the patient, including unrelenting headache, vision changes/blurred vision, and RUQ pain  - BP cuff for home for checking BP twice a day  - Pt instructed to call primary OB if SBP > 160 or DBP > 110 or if development of PEC symptoms   - On discharge, follow up with primary OB in:       - 2-5 days for BP check       - 4-6 week for post-partum visit     Pertinent Physical Exam At Time of Discharge  General: well appearing, well nourished, postpartum  Obstetric: fundus firm below umbilicus, lochia light  Skin: Warm, dry; no rashes/lesions/erythema; Pfannenstiel incision: clean, dry, well approximated, open to air, negative discharge/warmth/erythema   Breast: No masses, nipple discharge  Neuro: A/Ox3, conversational, no gross motor deficit   GI: no distension, appropriately tender, soft, +BS  Respiratory: Even and unlabored on RA, LSCTA BL  Cardiovascular: No BLE edema; No erythema, warmth  Psych: appropriate mood and affect       Your medication list        START taking these medications        Instructions Last Dose Given Next Dose Due   acetaminophen 325 mg tablet  Commonly known as: Tylenol      Take 3 tablets (975 mg) by mouth every 6 hours if needed for moderate pain (4 - 6).       ibuprofen 600 mg tablet      Take 1 tablet (600 mg) by mouth every 6 hours if needed for moderate pain (4 - 6).       NIFEdipine ER 30 mg 24 hr tablet  Commonly known as: Adalat CC      Take 1 tablet (30 mg) by mouth once daily in the morning. Take before meals. Do not crush, chew, or split.       polyethylene glycol 17 gram/dose powder  Commonly known as: Glycolax, Miralax      Take 17 g (mix 1 capful) and drink by mouth once daily as needed (constipation).              CONTINUE taking these medications        Instructions Last Dose Given Next Dose Due   Blood Pressure Cuff misc  Generic drug: miscellaneous medical supply      USE TO TEST BLOOD PRESSURE      "  blood-glucose meter misc  Commonly known as: Accu-Chek Guide Glucose Meter      Use for testing blood sugar in pregnancy       OneTouch Verio Flex meter misc  Generic drug: blood-glucose meter      Use for testing blood sugar fasting and 1 hour after each meal. 4-6 times per day during pregnancy       Easy Touch Alcohol Prep Pads pads, medicated  Generic drug: alcohol swabs      Use 1, up to 5 times a day       OneTouch Delica Plus Lancet 33 gauge misc  Generic drug: lancets      Use 1 lancet 4-6 times per day during pregnancy       OneTouch Delica Plus Lancet 33 gauge misc  Generic drug: lancets      Use 1 lancet 4-6 times per day during pregnancy       Prenatal Vitamin 27 mg iron-800 mcg folic acid tablet  Generic drug: prenatal vitamin (iron-folic)      TAKE 1 TABLET BY MOUTH ONCE DAILY       SUMAtriptan 50 mg tablet  Commonly known as: Imitrex      Take 1 tablet (50 mg) by mouth 1 time if needed for migraine for up to 1 dose. May repeat dose once in 2 hours if no relief.  Do not exceed 2 doses in 24 hours.       TechLITE Pen Needle 32 gauge x 5/32\" needle  Generic drug: pen needle, diabetic      Use 1 per injection, up to 5 times a day              STOP taking these medications      aspirin 81 mg chewable tablet        insulin glargine 100 unit/mL (3 mL) pen  Commonly known as: Lantus        magnesium oxide 500 mg magnesium tablet        metoclopramide 10 mg tablet  Commonly known as: Reglan        OneTouch Verio test strips strip  Generic drug: blood sugar diagnostic        Vitamin B-2 100 mg tablet tablet  Generic drug: riboflavin                  Where to Get Your Medications        These medications were sent to [facility]  [address], [address], [facility] [address]      Hours: 8AM to 6PM Mon-Fri, 8AM to 4PM Sat, 9AM to 1PM Sun Phone: [phone]   acetaminophen 325 mg tablet  ibuprofen 600 mg tablet  NIFEdipine ER 30 mg 24 hr tablet  polyethylene glycol 17 gram/dose powder       "     Outpatient Follow-Up  No future appointments.    Sisi Alexis, APRN-CNP

## 2024-03-18 NOTE — CARE PLAN
The patient's goals for the shift include controlled blood pressures throughout shift    The clinical goals for the shift include controlled blood pressures throughout shift    Over the shift, the patient did make progress toward the following goals.   Problem: Postpartum  Goal: Experiences normal postpartum course  Outcome: Progressing  Goal: Appropriate maternal -  bonding  Outcome: Progressing     Problem: Hypertensive Disorder of Pregnancy (HDP)  Goal: Minimal s/sx of HDP and BP<160/110  Outcome: Progressing

## 2024-03-21 LAB
LABORATORY COMMENT REPORT: NORMAL
PATH REPORT.FINAL DX SPEC: NORMAL
PATH REPORT.GROSS SPEC: NORMAL
PATH REPORT.RELEVANT HX SPEC: NORMAL
PATH REPORT.TOTAL CANCER: NORMAL

## 2024-05-22 ENCOUNTER — APPOINTMENT (OUTPATIENT)
Dept: OBSTETRICS AND GYNECOLOGY | Facility: CLINIC | Age: 24
End: 2024-05-22
Payer: MEDICAID

## 2025-03-12 ENCOUNTER — APPOINTMENT (OUTPATIENT)
Dept: OPHTHALMOLOGY | Facility: CLINIC | Age: 25
End: 2025-03-12
Payer: MEDICAID

## 2025-04-15 ENCOUNTER — APPOINTMENT (OUTPATIENT)
Dept: OPHTHALMOLOGY | Facility: CLINIC | Age: 25
End: 2025-04-15
Payer: MEDICAID

## 2025-04-15 DIAGNOSIS — H35.413 BILATERAL RETINAL LATTICE DEGENERATION: ICD-10-CM

## 2025-04-15 DIAGNOSIS — H52.13 SEVERE MYOPIA OF BOTH EYES: Primary | ICD-10-CM

## 2025-04-15 PROCEDURE — 92015 DETERMINE REFRACTIVE STATE: CPT | Performed by: OPTOMETRIST

## 2025-04-15 PROCEDURE — 92014 COMPRE OPH EXAM EST PT 1/>: CPT | Performed by: OPTOMETRIST

## 2025-04-15 ASSESSMENT — VISUAL ACUITY
OD_CC+: +1
CORRECTION_TYPE: GLASSES
OS_CC: 20/20
METHOD: SNELLEN - LINEAR
OS_CC+: +1
OD_CC: 20/25

## 2025-04-15 ASSESSMENT — CONF VISUAL FIELD
OD_INFERIOR_TEMPORAL_RESTRICTION: 0
OD_SUPERIOR_TEMPORAL_RESTRICTION: 0
OS_INFERIOR_TEMPORAL_RESTRICTION: 0
METHOD: COUNTING FINGERS
OS_INFERIOR_NASAL_RESTRICTION: 0
OD_INFERIOR_NASAL_RESTRICTION: 0
OS_SUPERIOR_NASAL_RESTRICTION: 0
OD_SUPERIOR_NASAL_RESTRICTION: 0
OS_SUPERIOR_TEMPORAL_RESTRICTION: 0
OD_NORMAL: 1
OS_NORMAL: 1

## 2025-04-15 ASSESSMENT — REFRACTION_CURRENTRX
OD_BRAND: PROCLEAR
OD_BASECURVE: 8.6
OD_CYLINDER: SPHERE
OS_BASECURVE: 8.6
OD_DIAMETER: 14.2
OS_DIAMETER: 14.2
OD_SPHERE: -16.00
OS_SPHERE: -16.00
OS_CYLINDER: SPHERE
OS_BRAND: PROCLEAR

## 2025-04-15 ASSESSMENT — REFRACTION_MANIFEST
OD_SPHERE: -17.25
OD_CYLINDER: -2.00
OD_AXIS: 175
OD_SPHERE: -16.50
OS_SPHERE: -17.75
OS_CYLINDER: -1.75
OS_AXIS: 170
OD_AXIS: 180
OD_CYLINDER: -2.25
OS_AXIS: 165
OS_SPHERE: -17.00
OS_CYLINDER: -1.50
METHOD_AUTOREFRACTION: 1

## 2025-04-15 ASSESSMENT — EXTERNAL EXAM - RIGHT EYE: OD_EXAM: NORMAL

## 2025-04-15 ASSESSMENT — CUP TO DISC RATIO
OS_RATIO: .3 TILTED
OD_RATIO: .3 TILTED

## 2025-04-15 ASSESSMENT — REFRACTION_WEARINGRX
OS_CYLINDER: -2.00
OD_CYLINDER: -2.25
OD_AXIS: 175
OS_AXIS: 165
OS_SPHERE: -17.75
OD_SPHERE: -18.25

## 2025-04-15 ASSESSMENT — SLIT LAMP EXAM - LIDS
COMMENTS: GOOD POSITION
COMMENTS: GOOD POSITION

## 2025-04-15 ASSESSMENT — TONOMETRY
OS_IOP_MMHG: 14
IOP_METHOD: TONOPEN
OD_IOP_MMHG: 18

## 2025-04-15 ASSESSMENT — ENCOUNTER SYMPTOMS: EYES NEGATIVE: 1

## 2025-04-15 ASSESSMENT — EXTERNAL EXAM - LEFT EYE: OS_EXAM: NORMAL

## 2025-04-15 NOTE — Clinical Note
Both eyes (OU) Contact Lens Order  Quantity: 1 Package: TRIAL Appointment needed? Yes Medically necessary? Yes Ship To: [facility] Additional instructions: order proclear trials Return to check when in

## 2025-04-16 NOTE — PROGRESS NOTES
Assessment/Plan   Diagnoses and all orders for this visit:  Severe myopia of both eyes  Bilateral retinal lattice degeneration    Difficult retinal exam due to extensive lattice and scarring, refer to retina service for depressed exam to R/O any true breaks or holes  The nature of lattice degeneration was discussed with the patient, including the increased risk of retinal breaks and retinal detachment. A discussion of the evidence regarding prophylactic laser photocoagulation for lattice degeneration to prevent retinal breaks and detachment was given. The patient was advised to return immediately for new flashes or floaters.     Requests to return to spherical ProClear lenses, order trials and Return to check WHEN IN    A spectacle prescription was dispensed to be used as needed.

## 2025-04-30 ENCOUNTER — APPOINTMENT (OUTPATIENT)
Facility: HOSPITAL | Age: 25
End: 2025-04-30
Payer: MEDICAID

## 2025-05-06 ENCOUNTER — OFFICE VISIT (OUTPATIENT)
Dept: OPHTHALMOLOGY | Facility: CLINIC | Age: 25
End: 2025-05-06
Payer: MEDICAID

## 2025-05-06 ENCOUNTER — APPOINTMENT (OUTPATIENT)
Dept: OPHTHALMOLOGY | Facility: CLINIC | Age: 25
End: 2025-05-06
Payer: MEDICAID

## 2025-05-06 DIAGNOSIS — H52.13 SEVERE MYOPIA OF BOTH EYES: Primary | ICD-10-CM

## 2025-05-06 DIAGNOSIS — H44.23 DEGENERATIVE MYOPIA OF BOTH EYES, UNSPECIFIED WHETHER COMPLICATION PRESENT: ICD-10-CM

## 2025-05-06 ASSESSMENT — REFRACTION_MANIFEST
OD_CYLINDER: -2.25
OS_AXIS: 170
OS_SPHERE: -17.75
OD_SPHERE: -17.25
OD_AXIS: 180
OS_CYLINDER: -1.75

## 2025-05-06 ASSESSMENT — ENCOUNTER SYMPTOMS
GASTROINTESTINAL NEGATIVE: 0
CARDIOVASCULAR NEGATIVE: 0
HEMATOLOGIC/LYMPHATIC NEGATIVE: 0
ENDOCRINE NEGATIVE: 0
ALLERGIC/IMMUNOLOGIC NEGATIVE: 0
PSYCHIATRIC NEGATIVE: 0
MUSCULOSKELETAL NEGATIVE: 0
EYES NEGATIVE: 1
RESPIRATORY NEGATIVE: 0
CONSTITUTIONAL NEGATIVE: 0
NEUROLOGICAL NEGATIVE: 0

## 2025-05-06 ASSESSMENT — REFRACTION_CURRENTRX
OD_BRAND: PROCLEAR
OS_SPHERE: -16.00
OS_SPHERE: -16.50
OS_BASECURVE: 8.6
OS_CYLINDER: SPHERE
OD_CYLINDER: SPHERE
OD_SPHERE: -16.00
OS_BASECURVE: 8.6
OS_BRAND: PROCLEAR
OD_BASECURVE: 8.6
OD_SPHERE: -16.6
OD_CYLINDER: SPHERE
OS_CYLINDER: SPHERE
OD_DIAMETER: 14.2
OS_DIAMETER: 14.2
OS_DIAMETER: 14.2
OD_BASECURVE: 8.6
OD_BRAND: PROCLEAR
OD_DIAMETER: 14.2
OS_BRAND: PROCLEAR

## 2025-05-06 ASSESSMENT — VISUAL ACUITY
OD_CC: 20/25
VA_OR_OD_CURRENT_RX: 20/20-2
CORRECTION_TYPE: CONTACTS
OD_CC+: -3
OS_CC+: -3
OS_CC: 20/25
VA_OR_OS_CURRENT_RX: 20/20-1
VA_OR_OD_CURRENT_RX: 20/20-2
VA_OR_OS_CURRENT_RX: 20/20-1
METHOD: SNELLEN - LINEAR

## 2025-05-06 ASSESSMENT — SLIT LAMP EXAM - LIDS
COMMENTS: GOOD POSITION
COMMENTS: GOOD POSITION

## 2025-05-06 ASSESSMENT — EXTERNAL EXAM - LEFT EYE: OS_EXAM: NORMAL

## 2025-05-06 ASSESSMENT — EXTERNAL EXAM - RIGHT EYE: OD_EXAM: NORMAL

## 2025-05-06 NOTE — Clinical Note
Both eyes (OU) Contact Lens Order  Quantity: 2 Package: 6 PK Appointment needed? No Medically necessary? Yes Ship To: Home Additional instructions: order 4  6 packs Proclear -16.50 Final Rx

## 2025-05-06 NOTE — PROGRESS NOTES
Assessment/Plan   Diagnoses and all orders for this visit:  Severe myopia of both eyes  Degenerative myopia of both eyes, unspecified whether complication present    Good fit and vision with Proclear contact lenses. Finalized year supply  Medically necessary due to pathological myopia, ABN signed.     Follow up Dr Anderson as scheduled for retina consult

## 2025-05-13 ENCOUNTER — APPOINTMENT (OUTPATIENT)
Dept: OPHTHALMOLOGY | Facility: CLINIC | Age: 25
End: 2025-05-13
Payer: MEDICAID

## 2025-06-18 ENCOUNTER — APPOINTMENT (OUTPATIENT)
Facility: HOSPITAL | Age: 25
End: 2025-06-18
Payer: MEDICAID

## 2025-06-18 ENCOUNTER — PHARMACY VISIT (OUTPATIENT)
Dept: PHARMACY | Facility: CLINIC | Age: 25
End: 2025-06-18
Payer: MEDICAID

## 2025-06-18 VITALS
SYSTOLIC BLOOD PRESSURE: 117 MMHG | BODY MASS INDEX: 33.32 KG/M2 | DIASTOLIC BLOOD PRESSURE: 83 MMHG | TEMPERATURE: 97 F | HEART RATE: 86 BPM | OXYGEN SATURATION: 93 % | WEIGHT: 200 LBS | HEIGHT: 65 IN

## 2025-06-18 DIAGNOSIS — Z72.51 HIGH RISK HETEROSEXUAL BEHAVIOR: ICD-10-CM

## 2025-06-18 DIAGNOSIS — Z86.32 HISTORY OF GESTATIONAL DIABETES: ICD-10-CM

## 2025-06-18 DIAGNOSIS — L83 ACANTHOSIS NIGRICANS: ICD-10-CM

## 2025-06-18 DIAGNOSIS — F33.2 SEVERE EPISODE OF RECURRENT MAJOR DEPRESSIVE DISORDER, WITHOUT PSYCHOTIC FEATURES (MULTI): ICD-10-CM

## 2025-06-18 DIAGNOSIS — R53.83 OTHER FATIGUE: Primary | ICD-10-CM

## 2025-06-18 DIAGNOSIS — G47.00 INSOMNIA, UNSPECIFIED TYPE: ICD-10-CM

## 2025-06-18 PROCEDURE — 99214 OFFICE O/P EST MOD 30 MIN: CPT | Performed by: STUDENT IN AN ORGANIZED HEALTH CARE EDUCATION/TRAINING PROGRAM

## 2025-06-18 PROCEDURE — 99204 OFFICE O/P NEW MOD 45 MIN: CPT | Performed by: STUDENT IN AN ORGANIZED HEALTH CARE EDUCATION/TRAINING PROGRAM

## 2025-06-18 PROCEDURE — 1036F TOBACCO NON-USER: CPT | Performed by: STUDENT IN AN ORGANIZED HEALTH CARE EDUCATION/TRAINING PROGRAM

## 2025-06-18 PROCEDURE — 3079F DIAST BP 80-89 MM HG: CPT | Performed by: STUDENT IN AN ORGANIZED HEALTH CARE EDUCATION/TRAINING PROGRAM

## 2025-06-18 PROCEDURE — 3074F SYST BP LT 130 MM HG: CPT | Performed by: STUDENT IN AN ORGANIZED HEALTH CARE EDUCATION/TRAINING PROGRAM

## 2025-06-18 PROCEDURE — RXMED WILLOW AMBULATORY MEDICATION CHARGE

## 2025-06-18 PROCEDURE — 3008F BODY MASS INDEX DOCD: CPT | Performed by: STUDENT IN AN ORGANIZED HEALTH CARE EDUCATION/TRAINING PROGRAM

## 2025-06-18 RX ORDER — SERTRALINE HYDROCHLORIDE 50 MG/1
50 TABLET, FILM COATED ORAL DAILY
Qty: 30 TABLET | Refills: 5 | Status: SHIPPED | OUTPATIENT
Start: 2025-06-18 | End: 2025-12-15

## 2025-06-18 ASSESSMENT — PAIN SCALES - GENERAL: PAINLEVEL_OUTOF10: 0-NO PAIN

## 2025-06-18 NOTE — PROGRESS NOTES
"  Medical record number: [id number]    Subjective   Patient ID: Scooter Granger is a 24 y.o. female who presents for Establish Care (Pt needs an STD check and wants to be check for diabetes as well. ).    1. STI check  Heterosexual sex  Condoms most of the time  Partners do not exhibit any symptoms or report anything to her  No symptoms at this time    2. H/o GDM  Concern today for diabetes  Was doing about 16U qAM, 10U qPM, under control  No other medication use  Never on DM medications or else diagnosed otherwise    A/  Acanthosis nigricans noted on nape of neck  Weight down since post-partum  BMI 33.28 obese class 1    P/  [ ] A1c, lipids, CMP    3. Fatigue, insomnia  Depression hx  \"Bed rotting\" most days as well  Does get help from father of children, mother, aunt, and best friend  Feels like she is a burden, though theey have all explicitly told her she is not (working on this in counseling)  Does not have access to firearms  Relates that she was once on seroquel; cannot recall dose, but does recall falling asleep for the better part of 2 days and being extremely groggy for days after  Relates that she does have anxiety all night, with racing thoughts and worry  Tired in the morning, jose snot feel \"driven by a motor\"  Has witnessed daryl in her maternal uncle, does not feel that she has ever experienced this herself    A/  Anterior neck fullness without discrete mass  DANIEL-7: score of 18; rated as very difficult  PHQ-9: score of 22 (Q#9 score 1); rated as very difficult  Denies SI/HI  Answer to Q#9 = \"sometimes just wish I wouldn't wake up\"    P/  [ ] cautiously initiating sertraline 50 mg  [ ] FUV 6-8 weeks         Social History:  - Lives with 4 kids  - Feels safe YES  - Occupation: none  - Tobacco or vaping: NO  - Alcohol use: 1/month  - Marijuana use: NO  - Illicit drug use: NO    Family History:  Family History[1]  DM2: mother, maternal grandmother and grandfather  Eye problems: father, paternal " "grandmother    Past Medical History:  Medical History[2]    Past Surgical History:  Surgical History[3]    OBGYN History:    Last delivery 3/15/2024  @ 36w1d  Last Hgb A1c 11/10/22 5.4%, noted gestational DM    Review of Systems   All other systems reviewed and are negative.      Objective   /83 (BP Location: Right arm, Patient Position: Sitting)   Pulse 86   Temp 36.1 °C (97 °F) (Temporal)   Ht 1.651 m (5' 5\")   Wt 90.7 kg (200 lb)   SpO2 93%   BMI 33.28 kg/m²     CBC:   Lab Results   Component Value Date    WBC 8.0 03/15/2024    RBC 4.22 03/15/2024    HGB 11.1 (L) 03/15/2024    HCT 33.0 (L) 03/15/2024    MCV 78 (L) 03/15/2024    MCH 26.3 03/15/2024    MCHC 33.6 03/15/2024     03/15/2024    MPV 9.0 10/15/2023     CBC w/Diff:   Lab Results   Component Value Date    WBC 8.0 03/15/2024    NRBC 0.0 03/15/2024    RBC 4.22 03/15/2024    HGB 11.1 (L) 03/15/2024    HCT 33.0 (L) 03/15/2024    MCV 78 (L) 03/15/2024    MCHC 33.6 03/15/2024     03/15/2024    RDW 14.3 03/15/2024    NEUTOPHILPCT 56.4 2024    IGPCT 1.0 (H) 2024    LYMPHOPCT 27.7 2024    MONOPCT 12.1 2024    EOSPCT 2.4 2024    BASOPCT 0.4 2024    NEUTROABS 4.61 2024    LYMPHSABS 2.26 2024    MONOSABS 0.99 2024    EOSABS 0.20 2024    BASOSABS 0.03 2024     CMP:  Lab Results   Component Value Date    GLUCOSE 111 (H) 03/15/2024     03/15/2024    K 3.4 (L) 03/15/2024     03/15/2024    CO2 20 (L) 03/15/2024    ANIONGAP 12 03/15/2024    BUN 8 03/15/2024    CREATININE 0.45 (L) 03/15/2024    GFRF >90 2023    CALCIUM 8.6 03/15/2024    ALBUMIN 3.0 (L) 03/15/2024    ALKPHOS 169 (H) 03/15/2024    PROT 5.5 (L) 03/15/2024    AST 8 (L) 03/15/2024    BILITOT 0.2 03/15/2024    ALT 4 (L) 03/15/2024        BMP:  Lab Results   Component Value Date    GLUCOSE 111 (H) 03/15/2024    BUN 8 03/15/2024    CREATININE 0.45 (L) 03/15/2024     03/15/2024    K 3.4 (L) " 03/15/2024     03/15/2024    CO2 20 (L) 03/15/2024    ANIONGAP 12 03/15/2024    CALCIUM 8.6 03/15/2024    EGFR >90 03/15/2024      HgA1c:   Lab Results   Component Value Date    HGBA1C 5.4 11/10/2022    JYDYHPXM1C 108 11/10/2022     TSH:   Lab Results   Component Value Date    TSH 0.72 09/14/2022      Physical Exam  Vitals reviewed.   Constitutional:       Appearance: Normal appearance.      Comments: BMI 33, class 1 MOB   HENT:      Head: Normocephalic and atraumatic.      Mouth/Throat:      Mouth: Mucous membranes are moist.      Pharynx: Oropharynx is clear.   Eyes:      Extraocular Movements: Extraocular movements intact.      Conjunctiva/sclera: Conjunctivae normal.      Pupils: Pupils are equal, round, and reactive to light.   Neck:      Comments: Anterior neck fullness - non-tender, and no discrete mass  Cardiovascular:      Rate and Rhythm: Normal rate and regular rhythm.      Pulses: Normal pulses.      Heart sounds: Normal heart sounds.   Pulmonary:      Effort: Pulmonary effort is normal.      Breath sounds: Normal breath sounds.   Abdominal:      General: Abdomen is flat. Bowel sounds are normal.      Palpations: Abdomen is soft.   Musculoskeletal:         General: Normal range of motion.      Cervical back: Normal range of motion and neck supple.   Skin:     General: Skin is warm and dry.      Capillary Refill: Capillary refill takes less than 2 seconds.      Comments: Acanthosis nigricans noted on nape of neck   Neurological:      General: No focal deficit present.      Mental Status: She is alert.   Psychiatric:         Mood and Affect: Mood normal.         Behavior: Behavior normal.         Assessment/Plan   Problem List Items Addressed This Visit           ICD-10-CM    Gestational diabetes O24.419     Other Visit Diagnoses         Codes      Other fatigue    -  Primary R53.83    Relevant Orders    CBC    TSH with reflex to Free T4 if abnormal      Insomnia, unspecified type     G47.00     Relevant Orders    CBC    Comprehensive Metabolic Panel    TSH with reflex to Free T4 if abnormal      Acanthosis nigricans     L83    Relevant Orders    Hemoglobin A1C      High risk heterosexual behavior     Z72.51    Relevant Orders    HIV 1/2 Antigen/Antibody Screen with Reflex to Confirmation    C. trachomatis / N. gonorrhoeae, Amplified, Urogenital    Syphilis Screen with Reflex      Severe episode of recurrent major depressive disorder, without psychotic features (Multi)     F33.2    Relevant Medications    sertraline (Zoloft) 50 mg tablet                 FUV 6-8 weeks    Note: this documentation was entered into the electronic medical record using Dragon medical dictation software, and was not necessarily edited thereafter. Please excuse any errors of spelling or grammar.         [1]   Family History  Problem Relation Name Age of Onset    Diabetes Mother     [2]   Past Medical History:  Diagnosis Date    Degenerative myopia, 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    Bilateral degenerative progressive high myopia    Encounter for screening for infections with a predominantly sexual mode of transmission 12/11/2020    Routine screening for STI (sexually transmitted infection)    History of gestational diabetes 10/18/2023    For early 1-hr GTT    Lattice degeneration of retina, 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    Personal history of other infectious and parasitic diseases 12/05/2018    History of herpes labialis    Unspecified chorioretinal scars, 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    Chorioretinal scar, both eyes   [3]   Past Surgical History:  Procedure Laterality Date    OTHER SURGICAL HISTORY  08/31/2017    Surg Results Vision Left Eye Central As Expected

## 2025-06-19 ENCOUNTER — PHARMACY VISIT (OUTPATIENT)
Dept: PHARMACY | Facility: CLINIC | Age: 25
End: 2025-06-19
Payer: MEDICAID

## 2025-06-19 ENCOUNTER — CLINICAL SUPPORT (OUTPATIENT)
Facility: HOSPITAL | Age: 25
End: 2025-06-19
Payer: MEDICAID

## 2025-06-19 DIAGNOSIS — A74.9 CHLAMYDIA: Primary | ICD-10-CM

## 2025-06-19 PROCEDURE — 2500000004 HC RX 250 GENERAL PHARMACY W/ HCPCS (ALT 636 FOR OP/ED): Performed by: STUDENT IN AN ORGANIZED HEALTH CARE EDUCATION/TRAINING PROGRAM

## 2025-06-19 PROCEDURE — RXMED WILLOW AMBULATORY MEDICATION CHARGE

## 2025-06-19 RX ORDER — CEFTRIAXONE 1 G/1
1 INJECTION, POWDER, FOR SOLUTION INTRAMUSCULAR; INTRAVENOUS ONCE
Status: DISCONTINUED | OUTPATIENT
Start: 2025-06-19 | End: 2025-06-19

## 2025-06-19 RX ORDER — CEFTRIAXONE 500 MG/1
500 INJECTION, POWDER, FOR SOLUTION INTRAMUSCULAR; INTRAVENOUS ONCE
Status: COMPLETED | OUTPATIENT
Start: 2025-06-19 | End: 2025-06-19

## 2025-06-19 RX ORDER — DOXYCYCLINE 100 MG/1
100 CAPSULE ORAL 2 TIMES DAILY
Qty: 14 CAPSULE | Refills: 0 | Status: SHIPPED | OUTPATIENT
Start: 2025-06-19 | End: 2025-06-26

## 2025-06-19 RX ADMIN — CEFTRIAXONE SODIUM 500 MG: 1 INJECTION, POWDER, FOR SOLUTION INTRAMUSCULAR; INTRAVENOUS at 15:28

## 2025-06-20 LAB
ALBUMIN SERPL-MCNC: 4.8 G/DL (ref 3.6–5.1)
ALP SERPL-CCNC: 69 U/L (ref 31–125)
ALT SERPL-CCNC: 28 U/L (ref 6–29)
ANION GAP SERPL CALCULATED.4IONS-SCNC: 8 MMOL/L (CALC) (ref 7–17)
AST SERPL-CCNC: 23 U/L (ref 10–30)
BILIRUB SERPL-MCNC: 0.3 MG/DL (ref 0.2–1.2)
BUN SERPL-MCNC: 10 MG/DL (ref 7–25)
C TRACH RRNA SPEC QL NAA+PROBE: DETECTED
CALCIUM SERPL-MCNC: 9.8 MG/DL (ref 8.6–10.2)
CHLORIDE SERPL-SCNC: 106 MMOL/L (ref 98–110)
CO2 SERPL-SCNC: 24 MMOL/L (ref 20–32)
CREAT SERPL-MCNC: 0.7 MG/DL (ref 0.5–0.96)
EGFRCR SERPLBLD CKD-EPI 2021: 123 ML/MIN/1.73M2
ERYTHROCYTE [DISTWIDTH] IN BLOOD BY AUTOMATED COUNT: 13.8 % (ref 11–15)
EST. AVERAGE GLUCOSE BLD GHB EST-MCNC: 128 MG/DL
EST. AVERAGE GLUCOSE BLD GHB EST-SCNC: 7.1 MMOL/L
GLUCOSE SERPL-MCNC: 112 MG/DL (ref 65–99)
HBA1C MFR BLD: 6.1 %
HCT VFR BLD AUTO: 42.2 % (ref 35–45)
HGB BLD-MCNC: 13.4 G/DL (ref 11.7–15.5)
HIV 1+2 AB+HIV1 P24 AG SERPL QL IA: NORMAL
MCH RBC QN AUTO: 26.7 PG (ref 27–33)
MCHC RBC AUTO-ENTMCNC: 31.8 G/DL (ref 32–36)
MCV RBC AUTO: 84.1 FL (ref 80–100)
N GONORRHOEA RRNA SPEC QL NAA+PROBE: NOT DETECTED
PLATELET # BLD AUTO: 356 THOUSAND/UL (ref 140–400)
PMV BLD REES-ECKER: 9.4 FL (ref 7.5–12.5)
POTASSIUM SERPL-SCNC: 4.4 MMOL/L (ref 3.5–5.3)
PROT SERPL-MCNC: 7.2 G/DL (ref 6.1–8.1)
QUEST GC CT AMPLIFIED (ALWAYS MESSAGE): ABNORMAL
RBC # BLD AUTO: 5.02 MILLION/UL (ref 3.8–5.1)
SODIUM SERPL-SCNC: 138 MMOL/L (ref 135–146)
T PALLIDUM AB SER QL IA: NORMAL
TSH SERPL-ACNC: 0.96 MIU/L
WBC # BLD AUTO: 7.1 THOUSAND/UL (ref 3.8–10.8)

## 2025-06-23 LAB
ALBUMIN SERPL-MCNC: 4.8 G/DL (ref 3.6–5.1)
ALP SERPL-CCNC: 69 U/L (ref 31–125)
ALT SERPL-CCNC: 28 U/L (ref 6–29)
ANION GAP SERPL CALCULATED.4IONS-SCNC: 8 MMOL/L (CALC) (ref 7–17)
AST SERPL-CCNC: 23 U/L (ref 10–30)
BILIRUB SERPL-MCNC: 0.3 MG/DL (ref 0.2–1.2)
BUN SERPL-MCNC: 10 MG/DL (ref 7–25)
C TRACH RRNA SPEC QL NAA+PROBE: DETECTED
CALCIUM SERPL-MCNC: 9.8 MG/DL (ref 8.6–10.2)
CHLORIDE SERPL-SCNC: 106 MMOL/L (ref 98–110)
CO2 SERPL-SCNC: 24 MMOL/L (ref 20–32)
CREAT SERPL-MCNC: 0.7 MG/DL (ref 0.5–0.96)
EGFRCR SERPLBLD CKD-EPI 2021: 123 ML/MIN/1.73M2
ERYTHROCYTE [DISTWIDTH] IN BLOOD BY AUTOMATED COUNT: 13.8 % (ref 11–15)
EST. AVERAGE GLUCOSE BLD GHB EST-MCNC: 128 MG/DL
EST. AVERAGE GLUCOSE BLD GHB EST-SCNC: 7.1 MMOL/L
GLUCOSE SERPL-MCNC: 112 MG/DL (ref 65–99)
HBA1C MFR BLD: 6.1 %
HCT VFR BLD AUTO: 42.2 % (ref 35–45)
HGB BLD-MCNC: 13.4 G/DL (ref 11.7–15.5)
HIV 1+2 AB+HIV1 P24 AG SERPL QL IA: NORMAL
MCH RBC QN AUTO: 26.7 PG (ref 27–33)
MCHC RBC AUTO-ENTMCNC: 31.8 G/DL (ref 32–36)
MCV RBC AUTO: 84.1 FL (ref 80–100)
N GONORRHOEA RRNA SPEC QL NAA+PROBE: NOT DETECTED
PLATELET # BLD AUTO: 356 THOUSAND/UL (ref 140–400)
PMV BLD REES-ECKER: 9.4 FL (ref 7.5–12.5)
POTASSIUM SERPL-SCNC: 4.4 MMOL/L (ref 3.5–5.3)
PROT SERPL-MCNC: 7.2 G/DL (ref 6.1–8.1)
QUEST GC CT AMPLIFIED (ALWAYS MESSAGE): ABNORMAL
RBC # BLD AUTO: 5.02 MILLION/UL (ref 3.8–5.1)
SODIUM SERPL-SCNC: 138 MMOL/L (ref 135–146)
T PALLIDUM AB SER QL IA: NEGATIVE
TSH SERPL-ACNC: 0.96 MIU/L
WBC # BLD AUTO: 7.1 THOUSAND/UL (ref 3.8–10.8)

## 2025-06-24 DIAGNOSIS — R73.03 PREDIABETES: Primary | ICD-10-CM

## 2025-06-24 PROCEDURE — RXMED WILLOW AMBULATORY MEDICATION CHARGE

## 2025-06-24 RX ORDER — METFORMIN HYDROCHLORIDE 500 MG/1
500 TABLET ORAL
Qty: 100 TABLET | Refills: 3 | Status: SHIPPED | OUTPATIENT
Start: 2025-06-24 | End: 2026-07-29

## 2025-06-26 ENCOUNTER — PHARMACY VISIT (OUTPATIENT)
Dept: PHARMACY | Facility: CLINIC | Age: 25
End: 2025-06-26
Payer: MEDICAID

## 2025-07-08 ENCOUNTER — APPOINTMENT (OUTPATIENT)
Dept: OPHTHALMOLOGY | Facility: CLINIC | Age: 25
End: 2025-07-08
Payer: MEDICAID

## 2025-07-11 ENCOUNTER — PHARMACY VISIT (OUTPATIENT)
Dept: PHARMACY | Facility: CLINIC | Age: 25
End: 2025-07-11
Payer: MEDICAID

## 2025-07-11 PROCEDURE — RXMED WILLOW AMBULATORY MEDICATION CHARGE
